# Patient Record
Sex: FEMALE | Race: BLACK OR AFRICAN AMERICAN | NOT HISPANIC OR LATINO | Employment: FULL TIME | URBAN - METROPOLITAN AREA
[De-identification: names, ages, dates, MRNs, and addresses within clinical notes are randomized per-mention and may not be internally consistent; named-entity substitution may affect disease eponyms.]

---

## 2017-02-06 ENCOUNTER — HOSPITAL ENCOUNTER (EMERGENCY)
Facility: HOSPITAL | Age: 27
Discharge: HOME | End: 2017-02-06
Attending: EMERGENCY MEDICINE
Payer: COMMERCIAL

## 2017-02-06 VITALS
RESPIRATION RATE: 18 BRPM | HEIGHT: 72 IN | WEIGHT: 293 LBS | OXYGEN SATURATION: 98 % | DIASTOLIC BLOOD PRESSURE: 86 MMHG | TEMPERATURE: 98 F | SYSTOLIC BLOOD PRESSURE: 152 MMHG | BODY MASS INDEX: 39.68 KG/M2 | HEART RATE: 85 BPM

## 2017-02-06 DIAGNOSIS — S20.212A CHEST WALL CONTUSION, LEFT, INITIAL ENCOUNTER: ICD-10-CM

## 2017-02-06 DIAGNOSIS — S39.012A BACK STRAIN, INITIAL ENCOUNTER: Primary | ICD-10-CM
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
## 2017-02-06 DIAGNOSIS — F41.1 ANXIETY REACTION: ICD-10-CM

## 2017-02-06 LAB
B-HCG UR QL: NEGATIVE
CTP QC/QA: YES

## 2017-02-06 PROCEDURE — 99284 EMERGENCY DEPT VISIT MOD MDM: CPT | Mod: 25

## 2017-02-06 PROCEDURE — 96374 THER/PROPH/DIAG INJ IV PUSH: CPT

## 2017-02-06 PROCEDURE — 87186 SC STD MICRODIL/AGAR DIL: CPT

## 2017-02-06 PROCEDURE — 87077 CULTURE AEROBIC IDENTIFY: CPT

## 2017-02-06 PROCEDURE — 63600175 PHARM REV CODE 636 W HCPCS: Performed by: EMERGENCY MEDICINE

## 2017-02-06 PROCEDURE — 81025 URINE PREGNANCY TEST: CPT | Performed by: EMERGENCY MEDICINE

## 2017-02-06 PROCEDURE — 87070 CULTURE OTHR SPECIMN AEROBIC: CPT

## 2017-02-06 RX ORDER — METHOCARBAMOL 500 MG/1
1000 TABLET, FILM COATED ORAL 3 TIMES DAILY
Qty: 30 TABLET | Refills: 0 | Status: SHIPPED | OUTPATIENT
Start: 2017-02-06 | End: 2017-02-11

## 2017-02-06 RX ORDER — CIPROFLOXACIN 500 MG/1
500 TABLET ORAL 2 TIMES DAILY
COMMUNITY
End: 2017-07-27 | Stop reason: ALTCHOICE

## 2017-02-06 RX ORDER — ONDANSETRON 2 MG/ML
4 INJECTION INTRAMUSCULAR; INTRAVENOUS
Status: COMPLETED | OUTPATIENT
Start: 2017-02-06 | End: 2017-02-06

## 2017-02-06 RX ADMIN — ONDANSETRON 4 MG: 2 INJECTION INTRAMUSCULAR; INTRAVENOUS at 06:02
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
## 2017-02-09 LAB — BACTERIA SPEC AEROBE CULT: NORMAL

## 2017-07-27 ENCOUNTER — HOSPITAL ENCOUNTER (EMERGENCY)
Facility: HOSPITAL | Age: 27
Discharge: HOME | End: 2017-07-27
Attending: EMERGENCY MEDICINE

## 2017-07-27 VITALS
HEIGHT: 72 IN | TEMPERATURE: 98 F | RESPIRATION RATE: 18 BRPM | BODY MASS INDEX: 39.68 KG/M2 | WEIGHT: 293 LBS | HEART RATE: 106 BPM | SYSTOLIC BLOOD PRESSURE: 132 MMHG | DIASTOLIC BLOOD PRESSURE: 86 MMHG | OXYGEN SATURATION: 100 %

## 2017-07-27 DIAGNOSIS — J18.9 PNEUMONIA OF RIGHT MIDDLE LOBE DUE TO INFECTIOUS ORGANISM: Primary | ICD-10-CM

## 2017-07-27 DIAGNOSIS — R06.02 SHORTNESS OF BREATH: ICD-10-CM

## 2017-07-27 DIAGNOSIS — R05.9 COUGH: ICD-10-CM

## 2017-07-27 LAB
ALBUMIN SERPL BCP-MCNC: 3.4 G/DL
ALP SERPL-CCNC: 49 U/L
ALT SERPL W/O P-5'-P-CCNC: 17 U/L
ANION GAP SERPL CALC-SCNC: 12 MMOL/L
AST SERPL-CCNC: 19 U/L
B-HCG UR QL: NEGATIVE
BASOPHILS # BLD AUTO: 0.04 K/UL
BASOPHILS NFR BLD: 0.6 %
BILIRUB SERPL-MCNC: 0.4 MG/DL
BNP SERPL-MCNC: 26 PG/ML
BUN SERPL-MCNC: 11 MG/DL
CALCIUM SERPL-MCNC: 9.5 MG/DL
CHLORIDE SERPL-SCNC: 106 MMOL/L
CO2 SERPL-SCNC: 25 MMOL/L
CREAT SERPL-MCNC: 0.8 MG/DL
CTP QC/QA: YES
DIFFERENTIAL METHOD: ABNORMAL
EOSINOPHIL # BLD AUTO: 0.2 K/UL
EOSINOPHIL NFR BLD: 2.4 %
ERYTHROCYTE [DISTWIDTH] IN BLOOD BY AUTOMATED COUNT: 13 %
EST. GFR  (AFRICAN AMERICAN): >60 ML/MIN/1.73 M^2
EST. GFR  (NON AFRICAN AMERICAN): >60 ML/MIN/1.73 M^2
GLUCOSE SERPL-MCNC: 100 MG/DL
HCT VFR BLD AUTO: 37.3 %
HGB BLD-MCNC: 12.4 G/DL
LYMPHOCYTES # BLD AUTO: 2.3 K/UL
LYMPHOCYTES NFR BLD: 37 %
MCH RBC QN AUTO: 28 PG
MCHC RBC AUTO-ENTMCNC: 33.2 G/DL
MCV RBC AUTO: 84 FL
MONOCYTES # BLD AUTO: 0.5 K/UL
MONOCYTES NFR BLD: 8.4 %
NEUTROPHILS # BLD AUTO: 3.2 K/UL
NEUTROPHILS NFR BLD: 51.6 %
PLATELET # BLD AUTO: 353 K/UL
PMV BLD AUTO: 9.2 FL
POTASSIUM SERPL-SCNC: 3.7 MMOL/L
PROT SERPL-MCNC: 7.6 G/DL
RBC # BLD AUTO: 4.43 M/UL
SODIUM SERPL-SCNC: 143 MMOL/L
WBC # BLD AUTO: 6.21 K/UL

## 2017-07-27 PROCEDURE — 96361 HYDRATE IV INFUSION ADD-ON: CPT

## 2017-07-27 PROCEDURE — 99285 EMERGENCY DEPT VISIT HI MDM: CPT | Mod: 25

## 2017-07-27 PROCEDURE — 93010 ELECTROCARDIOGRAM REPORT: CPT | Mod: ,,, | Performed by: INTERNAL MEDICINE

## 2017-07-27 PROCEDURE — 94761 N-INVAS EAR/PLS OXIMETRY MLT: CPT

## 2017-07-27 PROCEDURE — 96375 TX/PRO/DX INJ NEW DRUG ADDON: CPT

## 2017-07-27 PROCEDURE — 25000003 PHARM REV CODE 250: Performed by: EMERGENCY MEDICINE

## 2017-07-27 PROCEDURE — 27000221 HC OXYGEN, UP TO 24 HOURS

## 2017-07-27 PROCEDURE — 25000242 PHARM REV CODE 250 ALT 637 W/ HCPCS: Performed by: EMERGENCY MEDICINE

## 2017-07-27 PROCEDURE — 94640 AIRWAY INHALATION TREATMENT: CPT

## 2017-07-27 PROCEDURE — 96374 THER/PROPH/DIAG INJ IV PUSH: CPT

## 2017-07-27 PROCEDURE — 83880 ASSAY OF NATRIURETIC PEPTIDE: CPT

## 2017-07-27 PROCEDURE — 81025 URINE PREGNANCY TEST: CPT | Performed by: EMERGENCY MEDICINE

## 2017-07-27 PROCEDURE — 25500020 PHARM REV CODE 255: Performed by: EMERGENCY MEDICINE

## 2017-07-27 PROCEDURE — 85025 COMPLETE CBC W/AUTO DIFF WBC: CPT

## 2017-07-27 PROCEDURE — 63600175 PHARM REV CODE 636 W HCPCS: Performed by: EMERGENCY MEDICINE

## 2017-07-27 PROCEDURE — 93005 ELECTROCARDIOGRAM TRACING: CPT

## 2017-07-27 PROCEDURE — 80053 COMPREHEN METABOLIC PANEL: CPT

## 2017-07-27 RX ORDER — ACETAMINOPHEN 325 MG/1
650 TABLET ORAL
Status: COMPLETED | OUTPATIENT
Start: 2017-07-27 | End: 2017-07-27

## 2017-07-27 RX ORDER — METHYLPREDNISOLONE SODIUM SUCCINATE 125 MG/2ML
125 INJECTION INTRAMUSCULAR; INTRAVENOUS
Status: COMPLETED | OUTPATIENT
Start: 2017-07-27 | End: 2017-07-27

## 2017-07-27 RX ORDER — ALBUTEROL SULFATE 0.83 MG/ML
2.5 SOLUTION RESPIRATORY (INHALATION) EVERY 6 HOURS PRN
Qty: 75 ML | Refills: 0 | Status: SHIPPED | OUTPATIENT
Start: 2017-07-27 | End: 2018-09-17

## 2017-07-27 RX ORDER — DIPHENHYDRAMINE HYDROCHLORIDE 50 MG/ML
25 INJECTION INTRAMUSCULAR; INTRAVENOUS
Status: COMPLETED | OUTPATIENT
Start: 2017-07-27 | End: 2017-07-27

## 2017-07-27 RX ORDER — LEVOFLOXACIN 750 MG/1
750 TABLET ORAL DAILY
Qty: 7 TABLET | Refills: 0 | Status: SHIPPED | OUTPATIENT
Start: 2017-07-27 | End: 2017-08-03

## 2017-07-27 RX ORDER — PROMETHAZINE HYDROCHLORIDE AND CODEINE PHOSPHATE 6.25; 1 MG/5ML; MG/5ML
5 SOLUTION ORAL EVERY 4 HOURS PRN
COMMUNITY
End: 2017-07-27 | Stop reason: ALTCHOICE

## 2017-07-27 RX ORDER — ALBUTEROL SULFATE 90 UG/1
1-2 AEROSOL, METERED RESPIRATORY (INHALATION) EVERY 4 HOURS PRN
Qty: 1 INHALER | Refills: 1 | Status: SHIPPED | OUTPATIENT
Start: 2017-07-27 | End: 2017-08-26

## 2017-07-27 RX ORDER — AMOXICILLIN 500 MG/1
500 CAPSULE ORAL
COMMUNITY
End: 2018-09-17

## 2017-07-27 RX ORDER — MOXIFLOXACIN HYDROCHLORIDE 400 MG/1
400 TABLET ORAL DAILY
Status: DISCONTINUED | OUTPATIENT
Start: 2017-07-27 | End: 2017-07-27 | Stop reason: HOSPADM

## 2017-07-27 RX ORDER — IPRATROPIUM BROMIDE AND ALBUTEROL SULFATE 2.5; .5 MG/3ML; MG/3ML
3 SOLUTION RESPIRATORY (INHALATION)
Status: COMPLETED | OUTPATIENT
Start: 2017-07-27 | End: 2017-07-27

## 2017-07-27 RX ORDER — HYDROCODONE BITARTRATE AND HOMATROPINE METHYLBROMIDE ORAL SOLUTION 5; 1.5 MG/5ML; MG/5ML
5 LIQUID ORAL EVERY 4 HOURS PRN
Qty: 120 ML | Refills: 0 | Status: SHIPPED | OUTPATIENT
Start: 2017-07-27 | End: 2018-09-17

## 2017-07-27 RX ORDER — BENZONATATE 200 MG/1
200 CAPSULE ORAL 3 TIMES DAILY PRN
Qty: 20 CAPSULE | Refills: 0 | Status: SHIPPED | OUTPATIENT
Start: 2017-07-27 | End: 2017-08-06

## 2017-07-27 RX ORDER — BENZONATATE 100 MG/1
100 CAPSULE ORAL 3 TIMES DAILY PRN
Status: DISCONTINUED | OUTPATIENT
Start: 2017-07-27 | End: 2017-07-27 | Stop reason: HOSPADM

## 2017-07-27 RX ORDER — MORPHINE SULFATE 10 MG/ML
8 INJECTION INTRAMUSCULAR; INTRAVENOUS; SUBCUTANEOUS
Status: COMPLETED | OUTPATIENT
Start: 2017-07-27 | End: 2017-07-27

## 2017-07-27 RX ADMIN — BENZONATATE 100 MG: 100 CAPSULE ORAL at 10:07

## 2017-07-27 RX ADMIN — IPRATROPIUM BROMIDE AND ALBUTEROL SULFATE 3 ML: .5; 3 SOLUTION RESPIRATORY (INHALATION) at 09:07

## 2017-07-27 RX ADMIN — METHYLPREDNISOLONE SODIUM SUCCINATE 125 MG: 125 INJECTION, POWDER, FOR SOLUTION INTRAMUSCULAR; INTRAVENOUS at 11:07

## 2017-07-27 RX ADMIN — ACETAMINOPHEN 650 MG: 325 TABLET, FILM COATED ORAL at 09:07

## 2017-07-27 RX ADMIN — DIPHENHYDRAMINE HYDROCHLORIDE 25 MG: 50 INJECTION, SOLUTION INTRAMUSCULAR; INTRAVENOUS at 11:07

## 2017-07-27 RX ADMIN — IOHEXOL 75 ML: 350 INJECTION, SOLUTION INTRAVENOUS at 11:07

## 2017-07-27 RX ADMIN — SODIUM CHLORIDE 1000 ML: 0.9 INJECTION, SOLUTION INTRAVENOUS at 10:07

## 2017-07-27 RX ADMIN — IPRATROPIUM BROMIDE AND ALBUTEROL SULFATE 3 ML: .5; 3 SOLUTION RESPIRATORY (INHALATION) at 11:07

## 2017-07-27 RX ADMIN — MORPHINE SULFATE 8 MG: 10 INJECTION INTRAVENOUS at 11:07

## 2017-07-27 RX ADMIN — MOXIFLOXACIN HYDROCHLORIDE 400 MG: 400 TABLET, FILM COATED ORAL at 12:07

## 2017-07-27 NOTE — ED NOTES
Pt resting in comfortably in stretcher. Bed wheels locked, bed in lowest position, and call light within reach. VSS, no acute distress noted. Will continue to monitor.

## 2017-07-27 NOTE — ED PROVIDER NOTES
Encounter Date: 7/27/2017    SCRIBE #1 NOTE: I, Mine Aviles, am scribing for, and in the presence of,  Claude Willson MD . I have scribed the following portions of the note - Other sections scribed: HPI and ROS .       History     Chief Complaint   Patient presents with    Shortness of Breath     cough x 1 week, went to ED without releaf.  temp in 100's.  now with exp wheezing.  no Hx of asthma     CC:Shortness of Breath.   History obtained from patient.  Pt arrived via personal transportation.     HPI: This 26 y.o. Obese Female, who has HTN, IBS, and Migraine Headaches, presents to the ED for evaluation of a 1 week history of a persistent non-productive cough with associated fever, chest tightness, and dizziness. She was evaluated 6 days ago by her PCP who prescribed her Amoxicillin for URI. She has been compliant with this, but notes that symptoms have progressively worsened with development of wheezing. Overall symptoms are worse at night. She denies nausea, vomiting, diarrhea, dysuria, urinary frequency, leg swelling, or sore throat. She reports no further symptoms.       The history is provided by the patient. No  was used.     Review of patient's allergies indicates:   Allergen Reactions    Hydromorphone (bulk) Itching     Past Medical History:   Diagnosis Date    Anxiety     Back problem     Hypertension     IBS (irritable bowel syndrome)     Migraine headache     Obesity      History reviewed. No pertinent surgical history.  Family History   Problem Relation Age of Onset    Breast cancer Neg Hx     Colon cancer Neg Hx     Ovarian cancer Neg Hx      Social History   Substance Use Topics    Smoking status: Never Smoker    Smokeless tobacco: Never Used    Alcohol use No      Comment: socially     Review of Systems   Constitutional: Positive for fever.   HENT: Negative for sore throat.    Eyes: Negative for redness.   Respiratory: Positive for cough, chest tightness and  wheezing.    Cardiovascular: Negative for leg swelling.   Gastrointestinal: Negative for diarrhea, nausea and vomiting.   Genitourinary: Negative for dysuria.   Musculoskeletal: Negative for myalgias.   Skin: Negative for rash.   Neurological: Negative for headaches.       Physical Exam     Initial Vitals [07/27/17 0839]   BP Pulse Resp Temp SpO2   (!) 167/107 (!) 118 (!) 24 99.5 °F (37.5 °C) --      MAP       127         Physical Exam    Nursing note and vitals reviewed.  Constitutional: She appears well-developed and well-nourished. She appears distressed.   Eyes: EOM are normal. Pupils are equal, round, and reactive to light.   Neck: Normal range of motion. Neck supple. No thyromegaly present. No JVD present.   Cardiovascular: Regular rhythm, normal heart sounds and intact distal pulses. Exam reveals no gallop and no friction rub.    No murmur heard.  Tachycardic    Pulmonary/Chest: No respiratory distress. She has wheezes. She has rhonchi.   Persistent cough   Abdominal: Soft. Bowel sounds are normal. She exhibits no distension. There is no tenderness. There is no rebound and no guarding.   Musculoskeletal: Normal range of motion. She exhibits no edema or tenderness.   Neurological: She is alert and oriented to person, place, and time. She has normal strength. She displays normal reflexes. No cranial nerve deficit.   Skin: Skin is warm and dry.         ED Course   Procedures  Labs Reviewed   CBC W/ AUTO DIFFERENTIAL - Abnormal; Notable for the following:        Result Value    Platelets 353 (*)     All other components within normal limits   COMPREHENSIVE METABOLIC PANEL - Abnormal; Notable for the following:     Albumin 3.4 (*)     Alkaline Phosphatase 49 (*)     All other components within normal limits   B-TYPE NATRIURETIC PEPTIDE   POCT URINE PREGNANCY     EKG Readings: (Independently Interpreted)   Initial Reading: No STEMI. Rhythm: Sinus Tachycardia. Heart Rate: 106. Ectopy: No Ectopy. Conduction:  Normal. ST Segments: Normal ST Segments. T Waves: Normal.       X-Rays:   Independently Interpreted Readings:   Chest X-Ray: Normal heart size.  No infiltrates.  No acute abnormalities.       patient presents with cough.  She does have some pleuritic chest pain on the right.  States she's having shortness of breath.  Patient presented tachycardic with oxygen saturations in the low to mid 90s.  Patient mildly tachypnea.  No calf pain or leg swelling.  No orthopnea.  Patient reports fever but no fever in the emergency department.  No leukocytosis.  Chest x-ray did not show definitive pneumonia.  With consolation symptoms concerning for potential pulmonary embolism.  CT scan was performed shows no pulmonary embolism but does show right middle lobe pneumonia.  Patient has been treated with amoxicillin for the past several days.  No improvement.  Will give Avelox in the emergency department.  We'll prescribe Levaquin.  Patient has been taking Phenergan with codeine without cough suppression.  We'll switch to hydrocodone based cough medicine as well as Tessalon Perles.  Patient did get improvement with nebulizer treatments in the emergency department.  We'll prescribe pump inhaler and medication for her nebulizer.  Patient states she has the means to by the nebulizer.  After treatments patient's heart rate currently 93.  Patient's oxygen saturation currently 95% on room air.  Patient's cough is currently controlled.  Patient states she feels improved.  I did discuss admission for IV antibiotics due to prior antibiotic use.  Patient wants to go home.    I feel The patient is stable to go home.  Return to emergency room for new or worsening symptoms.  I also feel the patient has not been on appropriate antibiotic.            Scribe Attestation:   Scribe #1: I performed the above scribed service and the documentation accurately describes the services I performed. I attest to the accuracy of the note.    Attending  Attestation:           Physician Attestation for Scribe:  Physician Attestation Statement for Scribe #1: I, Claude Willson MD, reviewed documentation, as scribed by Mine Aviles in my presence, and it is both accurate and complete.                 ED Course     Clinical Impression:   The primary encounter diagnosis was Pneumonia of right middle lobe due to infectious organism. Diagnoses of Shortness of breath and Cough were also pertinent to this visit.                           Claude Willson MD  07/27/17 [id number]
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
## 2017-11-17 ENCOUNTER — HISTORICAL (OUTPATIENT)
Dept: INTERNAL MEDICINE | Facility: CLINIC | Age: 27
End: 2017-11-17

## 2017-11-17 LAB
ABS NEUT (OLG): 2.76 X10(3)/MCL (ref 2.1–9.2)
ALBUMIN SERPL-MCNC: 3.4 GM/DL (ref 3.4–5)
ALBUMIN/GLOB SERPL: 1 RATIO (ref 1–2)
ALP SERPL-CCNC: 57 UNIT/L (ref 45–117)
ALT SERPL-CCNC: 19 UNIT/L (ref 12–78)
APPEARANCE, UA: ABNORMAL
AST SERPL-CCNC: 9 UNIT/L (ref 15–37)
BACTERIA #/AREA URNS AUTO: ABNORMAL /[HPF]
BASOPHILS # BLD AUTO: 0.02 X10(3)/MCL
BASOPHILS NFR BLD AUTO: 0 % (ref 0–1)
BILIRUB SERPL-MCNC: 0.5 MG/DL (ref 0.2–1)
BILIRUB UR QL STRIP: NEGATIVE
BILIRUBIN DIRECT+TOT PNL SERPL-MCNC: 0.1 MG/DL
BILIRUBIN DIRECT+TOT PNL SERPL-MCNC: 0.4 MG/DL
BUN SERPL-MCNC: 14 MG/DL (ref 7–18)
CALCIUM SERPL-MCNC: 8.8 MG/DL (ref 8.5–10.1)
CHLORIDE SERPL-SCNC: 107 MMOL/L (ref 98–107)
CHOLEST SERPL-MCNC: 159 MG/DL
CHOLEST/HDLC SERPL: 3.5 {RATIO} (ref 0–4.4)
CO2 SERPL-SCNC: 28 MMOL/L (ref 21–32)
COLOR UR: YELLOW
CREAT SERPL-MCNC: 0.7 MG/DL (ref 0.6–1.3)
EOSINOPHIL # BLD AUTO: 0.06 X10(3)/MCL
EOSINOPHIL NFR BLD AUTO: 1 % (ref 0–5)
ERYTHROCYTE [DISTWIDTH] IN BLOOD BY AUTOMATED COUNT: 13.3 % (ref 11.5–14.5)
EST. AVERAGE GLUCOSE BLD GHB EST-MCNC: 108 MG/DL
GLOBULIN SER-MCNC: 4.1 GM/ML (ref 2.3–3.5)
GLUCOSE (UA): NORMAL
GLUCOSE SERPL-MCNC: 100 MG/DL (ref 74–106)
HAV IGM SERPL QL IA: NONREACTIVE
HBA1C MFR BLD: 5.4 % (ref 4.2–6.3)
HBV CORE IGM SERPL QL IA: NONREACTIVE
HBV SURFACE AG SERPL QL IA: NEGATIVE
HCT VFR BLD AUTO: 39.2 % (ref 35–46)
HCV AB SERPL QL IA: NONREACTIVE
HDLC SERPL-MCNC: 45 MG/DL
HGB BLD-MCNC: 12.9 GM/DL (ref 12–16)
HGB UR QL STRIP: 0.06 MG/DL
HIV 1+2 AB+HIV1 P24 AG SERPL QL IA: NONREACTIVE
HYALINE CASTS #/AREA URNS LPF: ABNORMAL /[LPF]
IMM GRANULOCYTES # BLD AUTO: 0.01 10*3/UL
IMM GRANULOCYTES NFR BLD AUTO: 0 %
KETONES UR QL STRIP: NEGATIVE
LDLC SERPL CALC-MCNC: 100 MG/DL (ref 0–130)
LEUKOCYTE ESTERASE UR QL STRIP: NEGATIVE
LYMPHOCYTES # BLD AUTO: 2.09 X10(3)/MCL
LYMPHOCYTES NFR BLD AUTO: 38 % (ref 15–40)
MCH RBC QN AUTO: 27.7 PG (ref 26–34)
MCHC RBC AUTO-ENTMCNC: 32.9 GM/DL (ref 31–37)
MCV RBC AUTO: 84.1 FL (ref 80–100)
MONOCYTES # BLD AUTO: 0.53 X10(3)/MCL
MONOCYTES NFR BLD AUTO: 10 % (ref 4–12)
NEUTROPHILS # BLD AUTO: 2.76 X10(3)/MCL
NEUTROPHILS NFR BLD AUTO: 50 X10(3)/MCL
NITRITE UR QL STRIP: NEGATIVE
PH UR STRIP: 5.5 [PH] (ref 4.5–8)
PLATELET # BLD AUTO: 293 X10(3)/MCL (ref 130–400)
PMV BLD AUTO: 9.4 FL (ref 7.4–10.4)
POTASSIUM SERPL-SCNC: 4 MMOL/L (ref 3.5–5.1)
PROT SERPL-MCNC: 7.5 GM/DL (ref 6.4–8.2)
PROT UR QL STRIP: 30 MG/DL
RBC # BLD AUTO: 4.66 X10(6)/MCL (ref 4–5.2)
RBC #/AREA URNS AUTO: ABNORMAL /[HPF]
SODIUM SERPL-SCNC: 141 MMOL/L (ref 136–145)
SP GR UR STRIP: 1.02 (ref 1–1.03)
SQUAMOUS #/AREA URNS LPF: >100 /[LPF]
T PALLIDUM AB SER QL: NONREACTIVE
TRIGL SERPL-MCNC: 71 MG/DL
TSH SERPL-ACNC: 3.19 MIU/L (ref 0.36–3.74)
UROBILINOGEN UR STRIP-ACNC: NORMAL
VLDLC SERPL CALC-MCNC: 14 MG/DL
WBC # SPEC AUTO: 5.5 X10(3)/MCL (ref 4.5–11)
WBC #/AREA URNS AUTO: ABNORMAL /HPF

## 2018-09-14 ENCOUNTER — TELEPHONE (OUTPATIENT)
Dept: OBSTETRICS AND GYNECOLOGY | Facility: CLINIC | Age: 28
End: 2018-09-14

## 2018-09-14 ENCOUNTER — HOSPITAL ENCOUNTER (EMERGENCY)
Facility: HOSPITAL | Age: 28
Discharge: HOME | End: 2018-09-14
Attending: EMERGENCY MEDICINE
Payer: MEDICAID

## 2018-09-14 VITALS
HEIGHT: 72 IN | BODY MASS INDEX: 39.68 KG/M2 | DIASTOLIC BLOOD PRESSURE: 103 MMHG | HEART RATE: 95 BPM | RESPIRATION RATE: 18 BRPM | TEMPERATURE: 98 F | OXYGEN SATURATION: 95 % | WEIGHT: 293 LBS | SYSTOLIC BLOOD PRESSURE: 175 MMHG

## 2018-09-14 DIAGNOSIS — R79.89 ELEVATED SERUM HCG: ICD-10-CM

## 2018-09-14 DIAGNOSIS — R10.30 LOWER ABDOMINAL PAIN: Primary | ICD-10-CM

## 2018-09-14 LAB
B-HCG UR QL: NEGATIVE
BACTERIA #/AREA URNS HPF: NORMAL /HPF
BILIRUB UR QL STRIP: NEGATIVE
CLARITY UR: CLEAR
COLOR UR: YELLOW
CTP QC/QA: YES
GLUCOSE UR QL STRIP: NEGATIVE
HCG INTACT+B SERPL-ACNC: 55 MIU/ML
HGB UR QL STRIP: ABNORMAL
HYALINE CASTS #/AREA URNS LPF: 0 /LPF
KETONES UR QL STRIP: NEGATIVE
LEUKOCYTE ESTERASE UR QL STRIP: NEGATIVE
MICROSCOPIC COMMENT: NORMAL
NITRITE UR QL STRIP: NEGATIVE
PH UR STRIP: 5 [PH] (ref 5–8)
PROT UR QL STRIP: ABNORMAL
RBC #/AREA URNS HPF: 3 /HPF (ref 0–4)
SP GR UR STRIP: 1.02 (ref 1–1.03)
SQUAMOUS #/AREA URNS HPF: NORMAL /HPF
URN SPEC COLLECT METH UR: ABNORMAL
UROBILINOGEN UR STRIP-ACNC: ABNORMAL EU/DL
WBC #/AREA URNS HPF: 0 /HPF (ref 0–5)

## 2018-09-14 PROCEDURE — 84702 CHORIONIC GONADOTROPIN TEST: CPT

## 2018-09-14 PROCEDURE — 25000003 PHARM REV CODE 250: Performed by: PHYSICIAN ASSISTANT

## 2018-09-14 PROCEDURE — 99284 EMERGENCY DEPT VISIT MOD MDM: CPT

## 2018-09-14 PROCEDURE — 81000 URINALYSIS NONAUTO W/SCOPE: CPT

## 2018-09-14 PROCEDURE — 81025 URINE PREGNANCY TEST: CPT | Performed by: PHYSICIAN ASSISTANT

## 2018-09-14 RX ORDER — METOPROLOL TARTRATE 50 MG/1
50 TABLET ORAL DAILY
COMMUNITY
End: 2019-09-21 | Stop reason: SDUPTHER

## 2018-09-14 RX ORDER — PROMETHAZINE HYDROCHLORIDE 25 MG/1
25 TABLET ORAL EVERY 4 HOURS PRN
COMMUNITY

## 2018-09-14 RX ORDER — DICYCLOMINE HYDROCHLORIDE 10 MG/1
10 CAPSULE ORAL 3 TIMES DAILY PRN
COMMUNITY

## 2018-09-14 RX ORDER — ACETAMINOPHEN 500 MG
1000 TABLET ORAL
Status: COMPLETED | OUTPATIENT
Start: 2018-09-14 | End: 2018-09-14

## 2018-09-14 RX ADMIN — ACETAMINOPHEN 1000 MG: 500 TABLET, FILM COATED ORAL at 11:09

## 2018-09-14 NOTE — ED TRIAGE NOTES
Patient states she took multiple home pregnancy tests since Sunday with positive results. Seen by OBGYN on yesterday, with negative results. Took 2 more home tests after seeing MD with positive results. Vaginal spotting and abdominal cramping started on last PM.

## 2018-09-14 NOTE — TELEPHONE ENCOUNTER
----- Message from Bee Bermeo sent at 9/14/2018  9:42 AM CDT -----  Contact: Elena [phone]  Patient would like a call back. She has been experiencing some light spotting and feels she may be pregnant. The patient took an at home pregnancy test and it came back positive. Please call at your earliest convenience.

## 2018-09-14 NOTE — ED PROVIDER NOTES
Encounter Date: 9/14/2018  SORT:   28-year-old female approximately 4 weeks 5 days gravid based on last menstrual period 08/12/2018 presenting for evaluation of vaginal spotting began last night with associated lower abdominal cramping and nausea that began this morning.  Patient states she had positive UPT at home. Initial orders placed. Deb Strange PA-C       History     Chief Complaint   Patient presents with    Abdominal Pain     States she thinks she may be pregnant and is cramping and spotting.  Bleeding started last night.  Also states she is nauseous.  Last cycle was aug 12.     Vaginal Bleeding     28-year-old female with past medical history anxiety, hypertension, IBS, history of migraines, morbidly obese, presents to ED complaining of suspicion for pregnancy.  Patient states she has very irregular menstrual cycles, typically beginning on the 80 per month, ending on the 12th of the month.  She admits to heavy bleeding during each menstrual cycle with significant lower abdominal cramping.  Patient states she began with spotting on September 7th, September 8th.  She admits to numerous positive home pregnancy tests over the past 2 days.  She presented to her OB GYNs office yesterday, negative in office UPT x2.  This morning she began with lower abdominal cramping, primarily to the right inguinal region.  Two positive home pregnancy test this morning.  No history of pregnancy.  No history of abdominal surgeries.  No history of ovarian cyst or uterine fibroids.  Denies vaginal bleeding, spotting, discharge, discomfort today.  No nausea or emesis.  No alleviating or exacerbating factors.  No radiation of symptoms. Severity 5/10. No change in bowel habits.  No urinary complaints, no flank pain.          Review of patient's allergies indicates:   Allergen Reactions    Hydromorphone (bulk) Itching     Past Medical History:   Diagnosis Date    Anxiety     Back problem     Hypertension     IBS (irritable  bowel syndrome)     Migraine headache     Obesity      No past surgical history on file.  Family History   Problem Relation Age of Onset    Breast cancer Neg Hx     Colon cancer Neg Hx     Ovarian cancer Neg Hx      Social History     Tobacco Use    Smoking status: Never Smoker    Smokeless tobacco: Never Used   Substance Use Topics    Alcohol use: No     Comment: socially    Drug use: No     Review of Systems   Constitutional: Negative for chills and fever.   HENT: Negative for sore throat.    Eyes: Negative.    Respiratory: Negative for shortness of breath.    Cardiovascular: Negative for chest pain.   Gastrointestinal: Positive for abdominal pain. Negative for constipation, diarrhea, nausea and vomiting.   Endocrine: Negative.    Genitourinary: Positive for menstrual problem. Negative for difficulty urinating, dysuria, flank pain, frequency, pelvic pain, vaginal bleeding, vaginal discharge and vaginal pain.   Musculoskeletal: Negative for back pain, myalgias, neck pain and neck stiffness.   Skin: Negative for rash.   Neurological: Negative for dizziness, weakness, light-headedness and headaches.   Hematological: Does not bruise/bleed easily.   Psychiatric/Behavioral: Negative.    All other systems reviewed and are negative.      Physical Exam     Initial Vitals [09/14/18 1030]   BP Pulse Resp Temp SpO2   (!) 193/102 94 16 98.9 °F (37.2 °C) 100 %      MAP       --         Physical Exam    Nursing note and vitals reviewed.  Constitutional: She appears well-developed and well-nourished. She is not diaphoretic. No distress.   Overall well-appearing, nontoxic.  Resting comfortably on exam table.   HENT:   Head: Normocephalic and atraumatic.   Eyes: Conjunctivae and EOM are normal. Pupils are equal, round, and reactive to light.   Neck: Normal range of motion. Neck supple. No tracheal deviation present.   Cardiovascular: Intact distal pulses.   Pulmonary/Chest: Breath sounds normal. No stridor. No respiratory  distress. She has no wheezes.   Abdominal:   Abdomen overall soft, normal bowel sounds ×4.  Mild ttp R inguinal region. No inguinal lymphadenopathy.  No rebound or guarding.  No palpable mass or distention.  No flank or CVA tenderness.  Negative Lamar sign.  No pain over McBurney's point.   Lymphadenopathy:     She has no cervical adenopathy.   Neurological: She is alert and oriented to person, place, and time.   Skin: Skin is warm and dry. Capillary refill takes less than 2 seconds.   Psychiatric: She has a normal mood and affect. Her behavior is normal. Judgment and thought content normal.         ED Course   Procedures  Labs Reviewed   URINALYSIS, REFLEX TO URINE CULTURE - Abnormal; Notable for the following components:       Result Value    Protein, UA 1+ (*)     Occult Blood UA 2+ (*)     Urobilinogen, UA 2.0-3.0 (*)     All other components within normal limits    Narrative:     Preferred Collection Type->Urine, Clean Catch   HCG, QUANTITATIVE, PREGNANCY   URINALYSIS MICROSCOPIC    Narrative:     Preferred Collection Type->Urine, Clean Catch   POCT URINE PREGNANCY          Imaging Results          US OB Less Than 14 Wks with Transvaginal (xpd) (Final result)  Result time 09/14/18 14:09:20    Final result by Minh Rachel MD (09/14/18 14:09:20)                 Impression:      No IUP identified consistent with pregnancy of unknown location.  Follow-up with serial beta HCG and pelvic ultrasound as clinically warranted.    Right ovarian 2.5 cm probable simple cyst.      Electronically signed by: Minh Rachel MD  Date:    09/14/2018  Time:    14:09             Narrative:    EXAMINATION:  US OB LESS THAN 14 WKS WITH TRANSVAGINAL (XPD)    CLINICAL HISTORY:  R inguinal pain, multiple +home UPT, hcg 55;    TECHNIQUE:  Transabdominal sonography of the pelvis was performed, followed by transvaginal sonography to better evaluate the uterus and ovaries.    COMPARISON:  CT renal stone study  03/29/2014    FINDINGS:  Uterus measures 8.6 x 6 x 4.8 cm.  There are at least 2 intramural fibroids within the posterior uterine body measuring 1.1 x 1 x 1.1 cm and 1 x 1 x 0.8 cm.  Endometrium measures up to 1 cm in thickness.  A few nabothian cysts are noted.    No IUP or extra uterine gestational sac seen.    Right ovary: 4.3 x 4.1 x 2.5 cm with intact arterial and venous flow, containing a 2.1 x 2 x 2.5 cm well-circumscribed nonvascular anechoic structure suggestive of a small simple cyst.    Left ovary: 2.1 x 1.3 x 3.3 cm with intact arterial and venous flow.    Miscellaneous: Trace free fluid with seen within the cervical canal, nonspecific but may be physiologic.  No free fluid within the cul-de-sac.  Beta HCG is reportedly 55 units.                                 Medical Decision Making:   Differential Diagnosis:   Ovarian cyst, PID, UTI, STI, abdominal plain during menses  ED Management:  Pt did bring home UPT to ED; both are +. Our ED UPT is negative. Given conflicting results, hcg pending.    HCG 55. U/S pending. R inguinal ttp and pain since this morning. No hx ovarian cyst, no hx TOA. No hx nephrolithiasis. Urine clean. Declined pelvic. U/S pregnancy of unknown location. Small R-sided likely ovarian cyst. May be culprit of pain, however given elevated hCG, pt should see OB on Monday. She has an appt. I do not suspect surgical abdomen, however I have given her strict return precautions. Pt refused pelvic, therefore unable to do a thorough assessment of adnexa, however u/s without fallopian tube or adnexal abnormality to suggest hydrosalpinx, TOA, or ectopic. BP similar to previous presentations. She denies lightheadedness/dizziness, headache, n/v, or visual disturbance. I do feel she can be safely discharged without further intervention or work-up. She does understand and agree. Return precautions given.      Other:   I have discussed this case with another health care provider.       <> Summary of the  Discussion: Dr. Rangel                      Clinical Impression:   The primary encounter diagnosis was Lower abdominal pain. A diagnosis of Elevated serum hCG was also pertinent to this visit.      Disposition:   Disposition: Discharged                        Brijesh Guzman PA-C  09/15/18 0874

## 2018-09-17 ENCOUNTER — PATIENT MESSAGE (OUTPATIENT)
Dept: OBSTETRICS AND GYNECOLOGY | Facility: CLINIC | Age: 28
End: 2018-09-17

## 2018-09-17 ENCOUNTER — OFFICE VISIT (OUTPATIENT)
Dept: OBSTETRICS AND GYNECOLOGY | Facility: CLINIC | Age: 28
End: 2018-09-17
Payer: MEDICAID

## 2018-09-17 ENCOUNTER — LAB VISIT (OUTPATIENT)
Dept: LAB | Facility: HOSPITAL | Age: 28
End: 2018-09-17
Attending: OBSTETRICS & GYNECOLOGY
Payer: MEDICAID

## 2018-09-17 VITALS
WEIGHT: 293 LBS | HEIGHT: 72 IN | TEMPERATURE: 99 F | DIASTOLIC BLOOD PRESSURE: 98 MMHG | SYSTOLIC BLOOD PRESSURE: 140 MMHG | BODY MASS INDEX: 39.68 KG/M2

## 2018-09-17 DIAGNOSIS — O20.0 ABORTION, THREATENED, EARLY PREGNANCY: ICD-10-CM

## 2018-09-17 DIAGNOSIS — O20.0 ABORTION, THREATENED, EARLY PREGNANCY: Primary | ICD-10-CM

## 2018-09-17 LAB — HCG INTACT+B SERPL-ACNC: 44 MIU/ML

## 2018-09-17 PROCEDURE — 36415 COLL VENOUS BLD VENIPUNCTURE: CPT

## 2018-09-17 PROCEDURE — 99999 PR PBB SHADOW E&M-EST. PATIENT-LVL III: CPT | Mod: PBBFAC,,, | Performed by: OBSTETRICS & GYNECOLOGY

## 2018-09-17 PROCEDURE — 84702 CHORIONIC GONADOTROPIN TEST: CPT

## 2018-09-17 PROCEDURE — 99204 OFFICE O/P NEW MOD 45 MIN: CPT | Mod: S$PBB,TH,, | Performed by: OBSTETRICS & GYNECOLOGY

## 2018-09-17 PROCEDURE — 99213 OFFICE O/P EST LOW 20 MIN: CPT | Mod: PBBFAC,TH | Performed by: OBSTETRICS & GYNECOLOGY

## 2018-09-17 PROCEDURE — 88305 TISSUE EXAM BY PATHOLOGIST: CPT | Performed by: PATHOLOGY

## 2018-09-17 PROCEDURE — 88305 TISSUE EXAM BY PATHOLOGIST: CPT | Mod: 26,,, | Performed by: PATHOLOGY

## 2018-09-17 RX ORDER — KETOCONAZOLE 20 MG/ML
SHAMPOO, SUSPENSION TOPICAL
Refills: 2 | COMMUNITY
Start: 2018-08-18 | End: 2020-06-11

## 2018-09-17 RX ORDER — VALACYCLOVIR HYDROCHLORIDE 1 G/1
2000 TABLET, FILM COATED ORAL 2 TIMES DAILY
Refills: 0 | COMMUNITY
Start: 2018-08-10 | End: 2024-01-19

## 2018-09-17 NOTE — PROGRESS NOTES
Subjective:       Patient ID: Elena Valencia is a 28 y.o. female.    Chief Complaint:  Follow-up (ER follow up on 18 for abd pain) and Vaginal Bleeding (Possible miscarriage)      History of Present Illness  HPI  Patient comes in today for follow-up  Seen in the Emergency Department on 2018 with spotting.  Told to be in early pregnancy.  ?Miscarriage.  Quantitative beta HCG was at 55 with pelvic ultrasound showing no gestational sac.  Two small fibroids at 1 cm in diameter seen.    Bleeding heavier today.  No passage of tissue noted.      GYN & OB History  Patient's last menstrual period was 2018 (exact date).   Date of Last Pap: No result found    OB History    Para Term  AB Living   1             SAB TAB Ectopic Multiple Live Births                  # Outcome Date GA Lbr Leonardo/2nd Weight Sex Delivery Anes PTL Lv   1 Current                 Past Medical History:   Diagnosis Date    Anxiety     Back problem     Hypertension     IBS (irritable bowel syndrome)     Migraine headache     Obesity        History reviewed. No pertinent surgical history.    Family History   Problem Relation Age of Onset    Heart attack Paternal Grandfather     Cancer Paternal Grandmother 50        Pancreatic cancer    Emphysema Maternal Grandfather     Breast cancer Neg Hx     Colon cancer Neg Hx     Ovarian cancer Neg Hx        Social History     Socioeconomic History    Marital status: Single     Spouse name: None    Number of children: None    Years of education: None    Highest education level: None   Social Needs    Financial resource strain: None    Food insecurity - worry: None    Food insecurity - inability: None    Transportation needs - medical: None    Transportation needs - non-medical: None   Occupational History    None   Tobacco Use    Smoking status: Never Smoker    Smokeless tobacco: Never Used   Substance and Sexual Activity    Alcohol use: No     Comment: socially     Drug use: No    Sexual activity: Yes     Partners: Male     Birth control/protection: Condom   Other Topics Concern    None   Social History Narrative    Together since 2010    He drives trucks    She is a        Current Outpatient Medications   Medication Sig Dispense Refill    dicyclomine (BENTYL) 10 MG capsule Take 10 mg by mouth 3 (three) times daily as needed (IBS).      ketoconazole (NIZORAL) 2 % shampoo USE SHAMPOO ONCE DAILY  2    lorazepam (ATIVAN) 1 MG tablet Take 1 tablet (1 mg total) by mouth every 6 (six) hours as needed for Anxiety. 6 tablet 0    metoprolol tartrate (LOPRESSOR) 50 MG tablet Take 50 mg by mouth once daily.      promethazine (PHENERGAN) 25 MG tablet Take 25 mg by mouth every 4 (four) hours as needed for Nausea (IBS).      valACYclovir (VALTREX) 1000 MG tablet Take 2,000 mg by mouth 2 (two) times daily.  0     No current facility-administered medications for this visit.        Review of patient's allergies indicates:   Allergen Reactions    Hydromorphone (bulk) Itching       Review of Systems  Review of Systems   Constitutional: Positive for fatigue. Negative for activity change, appetite change, fever and unexpected weight change.   Respiratory: Negative for cough, shortness of breath and wheezing.    Cardiovascular: Negative for chest pain and palpitations.   Gastrointestinal: Positive for abdominal pain. Negative for vomiting.   Endocrine: Negative for hot flashes.   Genitourinary: Positive for frequency, pelvic pain, vaginal bleeding and vaginal discharge. Negative for dyspareunia, urgency, dysmenorrhea and postcoital bleeding.   Musculoskeletal: Positive for back pain. Negative for myalgias.   Skin:  Negative for rash.   Neurological: Positive for headaches. Negative for seizures.   Psychiatric/Behavioral: Negative for depression and sleep disturbance. The patient is not nervous/anxious.    Breast: Negative for breast mass, breast pain and nipple discharge           Objective:    Physical Exam:   Constitutional: She appears well-developed and well-nourished. No distress.    HENT:   Head: Normocephalic and atraumatic.    Eyes: EOM are normal.    Neck: Normal range of motion.     Pulmonary/Chest: Effort normal. No respiratory distress.   Breasts: Non-tender, no engorgement, no masses, no retraction, no discharge. Negative for lymphadenopathy.         Abdominal: Soft. She exhibits no distension. There is no tenderness. There is no rebound and no guarding.     Genitourinary: Vagina normal and uterus normal.   Genitourinary Comments: Vulva without any obvious lesions.  Vaginal vault with good support.  Moderate blood in vaginal vault noted.  No obvious lesion.  Cervix is without any cervical motion tenderness.  No obvious lesion.  ?POCs at os.  But os is closed.  Uterus is about 10 weeks, non-tender, normal contour.  Adnexa is without any masses or tenderness.           Musculoskeletal: Normal range of motion.       Neurological: She is alert.    Skin: Skin is warm and dry.    Psychiatric: She has a normal mood and affect.          Assessment:        1. , threatened, early pregnancy    2.   Possible complete          Plan:      I have discussed with the patient regarding her condition  She most likely had a complete     Tissue removed from cervix will be sent to [facility]  Quantitative beta HCG today    Back in 2 weeks.  Sooner if necessary.

## 2018-09-18 ENCOUNTER — TELEPHONE (OUTPATIENT)
Dept: OBSTETRICS AND GYNECOLOGY | Facility: CLINIC | Age: 28
End: 2018-09-18

## 2018-09-18 ENCOUNTER — OFFICE VISIT (OUTPATIENT)
Dept: OBSTETRICS AND GYNECOLOGY | Facility: CLINIC | Age: 28
End: 2018-09-18
Payer: MEDICAID

## 2018-09-18 VITALS
SYSTOLIC BLOOD PRESSURE: 150 MMHG | HEIGHT: 72 IN | WEIGHT: 293 LBS | BODY MASS INDEX: 39.68 KG/M2 | DIASTOLIC BLOOD PRESSURE: 96 MMHG

## 2018-09-18 DIAGNOSIS — O03.9 SPONTANEOUS ABORTION: Primary | ICD-10-CM

## 2018-09-18 PROCEDURE — 99213 OFFICE O/P EST LOW 20 MIN: CPT | Mod: S$PBB,TH,, | Performed by: OBSTETRICS & GYNECOLOGY

## 2018-09-18 PROCEDURE — 99213 OFFICE O/P EST LOW 20 MIN: CPT | Mod: PBBFAC,TH | Performed by: OBSTETRICS & GYNECOLOGY

## 2018-09-18 PROCEDURE — 99999 PR PBB SHADOW E&M-EST. PATIENT-LVL III: CPT | Mod: PBBFAC,,, | Performed by: OBSTETRICS & GYNECOLOGY

## 2018-09-18 RX ORDER — MISOPROSTOL 200 UG/1
400 TABLET ORAL EVERY 4 HOURS
Qty: 12 TABLET | Refills: 0 | Status: SHIPPED | OUTPATIENT
Start: 2018-09-18 | End: 2018-10-02 | Stop reason: ALTCHOICE

## 2018-09-18 RX ORDER — ACETAMINOPHEN AND CODEINE PHOSPHATE 300; 30 MG/1; MG/1
1 TABLET ORAL EVERY 6 HOURS PRN
Qty: 10 TABLET | Refills: 0 | Status: SHIPPED | OUTPATIENT
Start: 2018-09-18 | End: 2018-09-28

## 2018-09-18 NOTE — PROGRESS NOTES
Subjective:       Patient ID: Elena Valencia is a 28 y.o. female.    Chief Complaint:  Pelvic Pain      History of Present Illness  HPI  Patient comes in again today complaining of more bleeding and severe cramps.  Passage of clots.  But no tissue noted.    Still bleeding today.    Denies fever or chills.  No nausea or vomiting.  Quantitative beta HCG on 2018 was 55.  Repeat yesterday, on 2018 was 44.      GYN & OB History  Patient's last menstrual period was 2018 (exact date).   Date of Last Pap: No result found    OB History    Para Term  AB Living   1             SAB TAB Ectopic Multiple Live Births                  # Outcome Date GA Lbr Leonardo/2nd Weight Sex Delivery Anes PTL Lv   1                  Past Medical History:   Diagnosis Date    Anxiety     Back problem     Hypertension     IBS (irritable bowel syndrome)     Migraine headache     Obesity        History reviewed. No pertinent surgical history.    Family History   Problem Relation Age of Onset    Heart attack Paternal Grandfather     Cancer Paternal Grandmother 50        Pancreatic cancer    Emphysema Maternal Grandfather     Breast cancer Neg Hx     Colon cancer Neg Hx     Ovarian cancer Neg Hx        Social History     Socioeconomic History    Marital status: Single     Spouse name: None    Number of children: None    Years of education: None    Highest education level: None   Social Needs    Financial resource strain: None    Food insecurity - worry: None    Food insecurity - inability: None    Transportation needs - medical: None    Transportation needs - non-medical: None   Occupational History    None   Tobacco Use    Smoking status: Never Smoker    Smokeless tobacco: Never Used   Substance and Sexual Activity    Alcohol use: No     Comment: socially    Drug use: No    Sexual activity: Yes     Partners: Male     Birth control/protection: Condom   Other Topics Concern    None   Social  History Narrative    Together since 2010    He drives trucks    She is a        Current Outpatient Medications   Medication Sig Dispense Refill    dicyclomine (BENTYL) 10 MG capsule Take 10 mg by mouth 3 (three) times daily as needed (IBS).      ketoconazole (NIZORAL) 2 % shampoo USE SHAMPOO ONCE DAILY  2    lorazepam (ATIVAN) 1 MG tablet Take 1 tablet (1 mg total) by mouth every 6 (six) hours as needed for Anxiety. 6 tablet 0    metoprolol tartrate (LOPRESSOR) 50 MG tablet Take 50 mg by mouth once daily.      promethazine (PHENERGAN) 25 MG tablet Take 25 mg by mouth every 4 (four) hours as needed for Nausea (IBS).      valACYclovir (VALTREX) 1000 MG tablet Take 2,000 mg by mouth 2 (two) times daily.  0     No current facility-administered medications for this visit.        Review of patient's allergies indicates:   Allergen Reactions    Hydromorphone (bulk) Itching       Review of Systems  Review of Systems   Constitutional: Negative for activity change, appetite change, chills, fatigue, fever and unexpected weight change.   HENT: Negative for mouth sores.    Respiratory: Negative for cough, shortness of breath and wheezing.    Cardiovascular: Negative for chest pain and palpitations.   Gastrointestinal: Negative for abdominal pain, bloating, blood in stool, constipation, nausea and vomiting.   Endocrine: Negative for diabetes and hot flashes.   Genitourinary: Positive for pelvic pain and vaginal bleeding. Negative for dyspareunia, dysuria, frequency, hematuria, menorrhagia, menstrual problem, urgency, vaginal discharge, vaginal pain, dysmenorrhea, urinary incontinence, postcoital bleeding and vaginal odor.        Cramps    Musculoskeletal: Negative for back pain and myalgias.   Skin:  Negative for rash.   Neurological: Negative for seizures and headaches.   Psychiatric/Behavioral: Negative for depression and sleep disturbance. The patient is not nervous/anxious.    Breast: Negative for breast mass,  breast pain and nipple discharge          Objective:    Physical Exam:   Constitutional: She appears well-developed and well-nourished. No distress.   Obese      HENT:   Head: Normocephalic and atraumatic.    Eyes: EOM are normal.    Neck: Normal range of motion.     Pulmonary/Chest: Effort normal. No respiratory distress.        Abdominal: Soft. She exhibits no distension. There is no tenderness. There is no rebound and no guarding.     Genitourinary: Vagina normal and uterus normal. No vaginal discharge found.   Genitourinary Comments: Vulva without any obvious lesions.  Vaginal vault with good support.  Moderate amount of blood and clot in vault noted.  No obvious lesion.  Cervix is without any cervical motion tenderness.  No obvious lesion.  Os is closed.  Uterus is small, retroverted, non-tender, normal contour.  Adnexa is without any masses or tenderness.           Musculoskeletal: Normal range of motion.       Neurological: She is alert.    Skin: Skin is warm and dry.    Psychiatric: She has a normal mood and affect.          Assessment:        1. Spontaneous              Plan:      I have discussed with the patient regarding her condition  She is undergoing a miscarriage.  Cytotec 200 mcg orally every 4-6 hours for 24 hours  Tylenol #3 for pain.  #10 given  Patient can not take NSAIDs secondary to gastric ulcers  Back as previously scheduled.

## 2018-09-18 NOTE — TELEPHONE ENCOUNTER
----- Message from Lissy Wooten sent at 9/18/2018 11:32 AM CDT -----  Contact: Self  Pt is calling to speak with staff. She is experiencing pain and bleeding after miscarriage and has some questions. Please call pt at [phone].   --------------------------------------------------------------  9/18/18 @ 8148 (MATT)  SPOKE WITH MS GOLDSMITH, PT 'S APPT CHANGED TO TODAY 9/18/18 @ 3PM DUE TO PAIN BLEEDING

## 2018-10-02 ENCOUNTER — OFFICE VISIT (OUTPATIENT)
Dept: OBSTETRICS AND GYNECOLOGY | Facility: CLINIC | Age: 28
End: 2018-10-02
Payer: MEDICAID

## 2018-10-02 VITALS
TEMPERATURE: 99 F | HEIGHT: 72 IN | SYSTOLIC BLOOD PRESSURE: 142 MMHG | DIASTOLIC BLOOD PRESSURE: 96 MMHG | WEIGHT: 293 LBS | BODY MASS INDEX: 39.68 KG/M2

## 2018-10-02 DIAGNOSIS — O03.9 COMPLETE MISCARRIAGE: Primary | ICD-10-CM

## 2018-10-02 DIAGNOSIS — O03.9 SPONTANEOUS ABORTION: ICD-10-CM

## 2018-10-02 LAB
B-HCG UR QL: NEGATIVE
CTP QC/QA: YES

## 2018-10-02 PROCEDURE — 99999 PR PBB SHADOW E&M-EST. PATIENT-LVL III: CPT | Mod: PBBFAC,,, | Performed by: OBSTETRICS & GYNECOLOGY

## 2018-10-02 PROCEDURE — 99213 OFFICE O/P EST LOW 20 MIN: CPT | Mod: S$PBB,TH,, | Performed by: OBSTETRICS & GYNECOLOGY

## 2018-10-02 PROCEDURE — 99213 OFFICE O/P EST LOW 20 MIN: CPT | Mod: PBBFAC,TH | Performed by: OBSTETRICS & GYNECOLOGY

## 2018-10-02 PROCEDURE — 81025 URINE PREGNANCY TEST: CPT | Mod: PBBFAC | Performed by: OBSTETRICS & GYNECOLOGY

## 2018-10-02 NOTE — PROGRESS NOTES
Subjective:       Patient ID: Elena Valencia is a 28 y.o. female.    Chief Complaint:  Follow-up (Follow up miscarriage)      History of Present Illness  HPI  Patient comes in today for follow-up  Status post complete   UPT today is negative  No longer with vaginal bleeding  Minimal pain on the left side.      GYN & OB History  No LMP recorded. Patient is not currently having periods (Reason: Other).   Date of Last Pap: No result found    OB History    Para Term  AB Living   1             SAB TAB Ectopic Multiple Live Births                  # Outcome Date GA Lbr Leonardo/2nd Weight Sex Delivery Anes PTL Lv   1                  Past Medical History:   Diagnosis Date    Anxiety     Back problem     Hypertension     IBS (irritable bowel syndrome)     Migraine headache     Obesity        History reviewed. No pertinent surgical history.    Family History   Problem Relation Age of Onset    Heart attack Paternal Grandfather     Cancer Paternal Grandmother 50        Pancreatic cancer    Emphysema Maternal Grandfather     Breast cancer Neg Hx     Colon cancer Neg Hx     Ovarian cancer Neg Hx        Social History     Socioeconomic History    Marital status: Single     Spouse name: None    Number of children: None    Years of education: None    Highest education level: None   Social Needs    Financial resource strain: None    Food insecurity - worry: None    Food insecurity - inability: None    Transportation needs - medical: None    Transportation needs - non-medical: None   Occupational History    None   Tobacco Use    Smoking status: Never Smoker    Smokeless tobacco: Never Used   Substance and Sexual Activity    Alcohol use: No     Comment: socially    Drug use: No    Sexual activity: Yes     Partners: Male     Birth control/protection: Condom   Other Topics Concern    None   Social History Narrative    Together since     He drives trucks    She is a         Current Outpatient Medications   Medication Sig Dispense Refill    dicyclomine (BENTYL) 10 MG capsule Take 10 mg by mouth 3 (three) times daily as needed (IBS).      ketoconazole (NIZORAL) 2 % shampoo USE SHAMPOO ONCE DAILY  2    lorazepam (ATIVAN) 1 MG tablet Take 1 tablet (1 mg total) by mouth every 6 (six) hours as needed for Anxiety. 6 tablet 0    metoprolol tartrate (LOPRESSOR) 50 MG tablet Take 50 mg by mouth once daily.      promethazine (PHENERGAN) 25 MG tablet Take 25 mg by mouth every 4 (four) hours as needed for Nausea (IBS).      valACYclovir (VALTREX) 1000 MG tablet Take 2,000 mg by mouth 2 (two) times daily.  0     No current facility-administered medications for this visit.        Review of patient's allergies indicates:   Allergen Reactions    Hydromorphone (bulk) Itching       Review of Systems  Review of Systems   Constitutional: Negative for activity change, appetite change, chills, fatigue, fever and unexpected weight change.   HENT: Negative for mouth sores.    Respiratory: Negative for cough, shortness of breath and wheezing.    Cardiovascular: Negative for chest pain and palpitations.   Gastrointestinal: Negative for abdominal pain, bloating, blood in stool, constipation, nausea and vomiting.   Endocrine: Negative for diabetes and hot flashes.   Genitourinary: Negative for dyspareunia, dysuria, frequency, hematuria, menorrhagia, menstrual problem, pelvic pain, urgency, vaginal bleeding, vaginal discharge, vaginal pain, dysmenorrhea, urinary incontinence, postcoital bleeding and vaginal odor.   Musculoskeletal: Negative for back pain and myalgias.   Skin:  Negative for rash.   Neurological: Negative for seizures and headaches.   Psychiatric/Behavioral: Negative for depression and sleep disturbance. The patient is not nervous/anxious.    Breast: Negative for breast mass, breast pain and nipple discharge          Objective:    Physical Exam:   Constitutional: She appears  well-developed and well-nourished. No distress.   Obese      HENT:   Head: Normocephalic and atraumatic.    Eyes: EOM are normal.    Neck: Normal range of motion.     Pulmonary/Chest: Effort normal. No respiratory distress.   Breasts: Non-tender, no engorgement, no masses, no retraction, no discharge. Negative for lymphadenopathy.         Abdominal: Soft. She exhibits no distension. There is no tenderness. There is no rebound and no guarding.     Genitourinary: Vagina normal and uterus normal. No vaginal discharge found.   Genitourinary Comments: Vulva without any obvious lesions.  Vaginal vault with good support.  Minimal white discharge noted.  No obvious lesion.  Cervix is without any cervical motion tenderness.  No obvious lesion.  Uterus is small, non-tender, normal contour.  Adnexa is without any masses or tenderness.           Musculoskeletal: Normal range of motion.       Neurological: She is alert.    Skin: Skin is warm and dry.    Psychiatric: She has a normal mood and affect.          Assessment:        1. Complete miscarriage    2. Spontaneous                 Plan:      I have discussed with the patient regarding her condition  She is doing well recovering from her miscarriage    We discussed future fertility  I would suggest her losing some weight prior to trying to get pregnant.  She would try to have a low calorie diet and increase her physical activities  Back in 3-6 months for follow-up  Sooner if she would need us.

## 2018-10-04 ENCOUNTER — PATIENT MESSAGE (OUTPATIENT)
Dept: OBSTETRICS AND GYNECOLOGY | Facility: CLINIC | Age: 28
End: 2018-10-04

## 2018-10-09 ENCOUNTER — TELEPHONE (OUTPATIENT)
Dept: OBSTETRICS AND GYNECOLOGY | Facility: CLINIC | Age: 28
End: 2018-10-09

## 2018-10-09 NOTE — TELEPHONE ENCOUNTER
----- Message from Milagros Mendoza sent at 10/9/2018  1:51 PM CDT -----  Contact: self  Please call pt to discuss pain in side. Pt states she was seen by primary care and they couldn't find anything. Please call [phone].  -----------------------------------------------------  10/9/18 @ 9982 ([facility])  SPOKE WITH MS GOLDSMITH, APPT MADE WITH DR RODRIGUEZ ON 10/12/158 @ 1110

## 2018-10-10 ENCOUNTER — PATIENT MESSAGE (OUTPATIENT)
Dept: OBSTETRICS AND GYNECOLOGY | Facility: CLINIC | Age: 28
End: 2018-10-10

## 2019-01-03 ENCOUNTER — PATIENT MESSAGE (OUTPATIENT)
Dept: OBSTETRICS AND GYNECOLOGY | Facility: CLINIC | Age: 29
End: 2019-01-03

## 2019-01-03 ENCOUNTER — OFFICE VISIT (OUTPATIENT)
Dept: OBSTETRICS AND GYNECOLOGY | Facility: CLINIC | Age: 29
End: 2019-01-03
Payer: MEDICAID

## 2019-01-03 ENCOUNTER — LAB VISIT (OUTPATIENT)
Dept: LAB | Facility: HOSPITAL | Age: 29
End: 2019-01-03
Attending: OBSTETRICS & GYNECOLOGY
Payer: MEDICAID

## 2019-01-03 VITALS
TEMPERATURE: 99 F | WEIGHT: 293 LBS | SYSTOLIC BLOOD PRESSURE: 154 MMHG | DIASTOLIC BLOOD PRESSURE: 84 MMHG | BODY MASS INDEX: 39.68 KG/M2 | HEIGHT: 72 IN

## 2019-01-03 DIAGNOSIS — Z32.00 ENCOUNTER FOR PREGNANCY TEST, RESULT UNKNOWN: ICD-10-CM

## 2019-01-03 DIAGNOSIS — Z32.00 ENCOUNTER FOR PREGNANCY TEST, RESULT UNKNOWN: Primary | ICD-10-CM

## 2019-01-03 LAB
B-HCG UR QL: NEGATIVE
CTP QC/QA: YES
HCG INTACT+B SERPL-ACNC: <1.2 MIU/ML

## 2019-01-03 PROCEDURE — 99999 PR PBB SHADOW E&M-EST. PATIENT-LVL II: CPT | Mod: PBBFAC,,, | Performed by: OBSTETRICS & GYNECOLOGY

## 2019-01-03 PROCEDURE — 99213 OFFICE O/P EST LOW 20 MIN: CPT | Mod: S$PBB,,, | Performed by: OBSTETRICS & GYNECOLOGY

## 2019-01-03 PROCEDURE — 36415 COLL VENOUS BLD VENIPUNCTURE: CPT

## 2019-01-03 PROCEDURE — 81025 URINE PREGNANCY TEST: CPT | Mod: PBBFAC | Performed by: OBSTETRICS & GYNECOLOGY

## 2019-01-03 PROCEDURE — 99213 PR OFFICE/OUTPT VISIT, EST, LEVL III, 20-29 MIN: ICD-10-PCS | Mod: S$PBB,,, | Performed by: OBSTETRICS & GYNECOLOGY

## 2019-01-03 PROCEDURE — 84702 CHORIONIC GONADOTROPIN TEST: CPT

## 2019-01-03 PROCEDURE — 99212 OFFICE O/P EST SF 10 MIN: CPT | Mod: PBBFAC | Performed by: OBSTETRICS & GYNECOLOGY

## 2019-01-03 PROCEDURE — 99999 PR PBB SHADOW E&M-EST. PATIENT-LVL II: ICD-10-PCS | Mod: PBBFAC,,, | Performed by: OBSTETRICS & GYNECOLOGY

## 2019-01-03 RX ORDER — MEDROXYPROGESTERONE ACETATE 10 MG/1
TABLET ORAL
Refills: 5 | COMMUNITY
Start: 2018-11-01 | End: 2019-06-07

## 2019-01-03 NOTE — PROGRESS NOTES
Subjective:       Patient ID: Elena Valencia is a 28 y.o. female.    Chief Complaint:  Follow-up (follow up miscarriage, pt would like to try again)      History of Present Illness  HPI  Patient comes in today, wondering if she is pregnant.  Status post spontaneous  in 2018, last year.  Has had regular menses since.  Next one should be this coming Monday, 2019.  Thinks she is pregnant.  However, the home urine pregnancy test yesterday, 19 was faintly positive.  It was negative this morning.    She would like to get a blood pregnancy test.    She also has pelvic pain, geneva in between menses (?)      GYN & OB History  Patient's last menstrual period was 12/10/2018 (exact date).   Date of Last Pap: No result found    OB History    Para Term  AB Living   1             SAB TAB Ectopic Multiple Live Births                  # Outcome Date GA Lbr Leonardo/2nd Weight Sex Delivery Anes PTL Lv   1                  Past Medical History:   Diagnosis Date    Anxiety     Back problem     Hypertension     IBS (irritable bowel syndrome)     Migraine headache     Obesity        History reviewed. No pertinent surgical history.    Family History   Problem Relation Age of Onset    Heart attack Paternal Grandfather     Cancer Paternal Grandmother 50        Pancreatic cancer    Emphysema Maternal Grandfather     Breast cancer Neg Hx     Colon cancer Neg Hx     Ovarian cancer Neg Hx        Social History     Socioeconomic History    Marital status: Single     Spouse name: None    Number of children: None    Years of education: None    Highest education level: None   Social Needs    Financial resource strain: None    Food insecurity - worry: None    Food insecurity - inability: None    Transportation needs - medical: None    Transportation needs - non-medical: None   Occupational History    None   Tobacco Use    Smoking status: Never Smoker    Smokeless tobacco: Never Used    Substance and Sexual Activity    Alcohol use: No     Comment: socially    Drug use: No    Sexual activity: Yes     Partners: Male     Birth control/protection: Condom   Other Topics Concern    None   Social History Narrative    Together since 2010    He drives trucks    She is a        Current Outpatient Medications   Medication Sig Dispense Refill    dicyclomine (BENTYL) 10 MG capsule Take 10 mg by mouth 3 (three) times daily as needed (IBS).      ketoconazole (NIZORAL) 2 % shampoo USE SHAMPOO ONCE DAILY  2    lorazepam (ATIVAN) 1 MG tablet Take 1 tablet (1 mg total) by mouth every 6 (six) hours as needed for Anxiety. 6 tablet 0    medroxyPROGESTERone (PROVERA) 10 MG tablet TAKE ONE TABLET BY MOUTH ONCE DAILY FOR 10 DAYS EVERY MONTH  5    metoprolol tartrate (LOPRESSOR) 50 MG tablet Take 50 mg by mouth once daily.      promethazine (PHENERGAN) 25 MG tablet Take 25 mg by mouth every 4 (four) hours as needed for Nausea (IBS).      valACYclovir (VALTREX) 1000 MG tablet Take 2,000 mg by mouth 2 (two) times daily.  0     No current facility-administered medications for this visit.        Review of patient's allergies indicates:   Allergen Reactions    Hydromorphone (bulk) Itching       Review of Systems  Review of Systems   Constitutional: Positive for fatigue. Negative for activity change, appetite change, chills, fever and unexpected weight change.   HENT: Negative for mouth sores.    Respiratory: Negative for cough, shortness of breath and wheezing.    Cardiovascular: Negative for chest pain and palpitations.   Gastrointestinal: Negative for abdominal pain, bloating, blood in stool, constipation, nausea and vomiting.   Endocrine: Negative for diabetes and hot flashes.   Genitourinary: Negative for dyspareunia, dysuria, frequency, hematuria, menorrhagia, menstrual problem, pelvic pain, urgency, vaginal bleeding, vaginal discharge, vaginal pain, dysmenorrhea, urinary incontinence, postcoital  bleeding and vaginal odor.   Musculoskeletal: Negative for back pain and myalgias.   Neurological: Negative for seizures and headaches.   Psychiatric/Behavioral: Negative for depression and sleep disturbance. The patient is not nervous/anxious.    Breast: Negative for mass, mastodynia and nipple discharge          Objective:    Physical Exam:   Constitutional: She appears well-developed and well-nourished. No distress.    HENT:   Head: Normocephalic and atraumatic.    Eyes: EOM are normal.    Neck: Normal range of motion.    Cardiovascular: Normal rate.     Pulmonary/Chest: Effort normal. No respiratory distress.        Abdominal: Soft. She exhibits no distension. There is no tenderness. There is no rebound and no guarding.             Musculoskeletal: Normal range of motion.       Neurological: She is alert.    Skin: Skin is warm and dry.    Psychiatric: She has a normal mood and affect.        UPT negative today in the office     Assessment:        1. Encounter for pregnancy test, result unknown    2.  Pelvic pain        Plan:      I have discussed with the patient regarding her condition.  She does not appear to be pregnant  Quantitative beta HCG.  If not pregnant, would give naproxen for pain  Back as needed.

## 2019-01-07 ENCOUNTER — PATIENT MESSAGE (OUTPATIENT)
Dept: OBSTETRICS AND GYNECOLOGY | Facility: CLINIC | Age: 29
End: 2019-01-07

## 2019-01-16 ENCOUNTER — PATIENT MESSAGE (OUTPATIENT)
Dept: OBSTETRICS AND GYNECOLOGY | Facility: CLINIC | Age: 29
End: 2019-01-16

## 2019-01-16 DIAGNOSIS — E28.2 PCOS (POLYCYSTIC OVARIAN SYNDROME): Primary | ICD-10-CM

## 2019-01-16 RX ORDER — METFORMIN HYDROCHLORIDE 500 MG/1
500 TABLET ORAL
Qty: 30 TABLET | Refills: 6 | Status: SHIPPED | OUTPATIENT
Start: 2019-01-16 | End: 2019-06-07

## 2019-05-13 ENCOUNTER — HOSPITAL ENCOUNTER (EMERGENCY)
Facility: HOSPITAL | Age: 29
Discharge: HOME | End: 2019-05-13
Attending: EMERGENCY MEDICINE
Payer: COMMERCIAL

## 2019-05-13 VITALS
OXYGEN SATURATION: 98 % | DIASTOLIC BLOOD PRESSURE: 65 MMHG | RESPIRATION RATE: 18 BRPM | HEIGHT: 72 IN | BODY MASS INDEX: 39.68 KG/M2 | HEART RATE: 86 BPM | SYSTOLIC BLOOD PRESSURE: 161 MMHG | WEIGHT: 293 LBS | TEMPERATURE: 98 F

## 2019-05-13 DIAGNOSIS — F41.9 ANXIETY: ICD-10-CM

## 2019-05-13 DIAGNOSIS — F43.0 STRESS REACTION: ICD-10-CM

## 2019-05-13 DIAGNOSIS — R07.9 CHEST PAIN: Primary | ICD-10-CM

## 2019-05-13 DIAGNOSIS — I10 HYPERTENSION, UNSPECIFIED TYPE: ICD-10-CM

## 2019-05-13 LAB
ALBUMIN SERPL BCP-MCNC: 3.6 G/DL (ref 3.5–5.2)
ALP SERPL-CCNC: 54 U/L (ref 55–135)
ALT SERPL W/O P-5'-P-CCNC: 20 U/L (ref 10–44)
ANION GAP SERPL CALC-SCNC: 6 MMOL/L (ref 8–16)
AST SERPL-CCNC: 14 U/L (ref 10–40)
B-HCG UR QL: NEGATIVE
BASOPHILS # BLD AUTO: 0.02 K/UL (ref 0–0.2)
BASOPHILS NFR BLD: 0.4 % (ref 0–1.9)
BILIRUB SERPL-MCNC: 0.4 MG/DL (ref 0.1–1)
BNP SERPL-MCNC: 20 PG/ML (ref 0–99)
BUN SERPL-MCNC: 9 MG/DL (ref 6–20)
CALCIUM SERPL-MCNC: 9.5 MG/DL (ref 8.7–10.5)
CHLORIDE SERPL-SCNC: 107 MMOL/L (ref 95–110)
CO2 SERPL-SCNC: 27 MMOL/L (ref 23–29)
CREAT SERPL-MCNC: 0.7 MG/DL (ref 0.5–1.4)
CTP QC/QA: YES
D DIMER PPP IA.FEU-MCNC: <0.19 MG/L FEU
DIFFERENTIAL METHOD: ABNORMAL
EOSINOPHIL # BLD AUTO: 0 K/UL (ref 0–0.5)
EOSINOPHIL NFR BLD: 0.7 % (ref 0–8)
ERYTHROCYTE [DISTWIDTH] IN BLOOD BY AUTOMATED COUNT: 13.6 % (ref 11.5–14.5)
EST. GFR  (AFRICAN AMERICAN): >60 ML/MIN/1.73 M^2
EST. GFR  (NON AFRICAN AMERICAN): >60 ML/MIN/1.73 M^2
GLUCOSE SERPL-MCNC: 82 MG/DL (ref 70–110)
GLUCOSE SERPL-MCNC: 95 MG/DL (ref 70–110)
HCT VFR BLD AUTO: 38.8 % (ref 37–48.5)
HGB BLD-MCNC: 12.2 G/DL (ref 12–16)
LYMPHOCYTES # BLD AUTO: 2.2 K/UL (ref 1–4.8)
LYMPHOCYTES NFR BLD: 39.4 % (ref 18–48)
MCH RBC QN AUTO: 27.1 PG (ref 27–31)
MCHC RBC AUTO-ENTMCNC: 31.4 G/DL (ref 32–36)
MCV RBC AUTO: 86 FL (ref 82–98)
MONOCYTES # BLD AUTO: 0.5 K/UL (ref 0.3–1)
MONOCYTES NFR BLD: 8.1 % (ref 4–15)
NEUTROPHILS # BLD AUTO: 2.9 K/UL (ref 1.8–7.7)
NEUTROPHILS NFR BLD: 51.4 % (ref 38–73)
PLATELET # BLD AUTO: 302 K/UL (ref 150–350)
PMV BLD AUTO: 9.4 FL (ref 9.2–12.9)
POTASSIUM SERPL-SCNC: 3.7 MMOL/L (ref 3.5–5.1)
PROT SERPL-MCNC: 6.8 G/DL (ref 6–8.4)
RBC # BLD AUTO: 4.51 M/UL (ref 4–5.4)
SODIUM SERPL-SCNC: 140 MMOL/L (ref 136–145)
TROPONIN I SERPL DL<=0.01 NG/ML-MCNC: <0.006 NG/ML (ref 0–0.03)
WBC # BLD AUTO: 5.58 K/UL (ref 3.9–12.7)

## 2019-05-13 PROCEDURE — 99285 EMERGENCY DEPT VISIT HI MDM: CPT | Mod: 25

## 2019-05-13 PROCEDURE — 84484 ASSAY OF TROPONIN QUANT: CPT

## 2019-05-13 PROCEDURE — 93010 ELECTROCARDIOGRAM REPORT: CPT | Mod: ,,, | Performed by: INTERNAL MEDICINE

## 2019-05-13 PROCEDURE — 93010 EKG 12-LEAD: ICD-10-PCS | Mod: ,,, | Performed by: INTERNAL MEDICINE

## 2019-05-13 PROCEDURE — 93005 ELECTROCARDIOGRAM TRACING: CPT

## 2019-05-13 PROCEDURE — 96374 THER/PROPH/DIAG INJ IV PUSH: CPT

## 2019-05-13 PROCEDURE — 96376 TX/PRO/DX INJ SAME DRUG ADON: CPT

## 2019-05-13 PROCEDURE — 85379 FIBRIN DEGRADATION QUANT: CPT

## 2019-05-13 PROCEDURE — 85025 COMPLETE CBC W/AUTO DIFF WBC: CPT

## 2019-05-13 PROCEDURE — 81025 URINE PREGNANCY TEST: CPT | Performed by: NURSE PRACTITIONER

## 2019-05-13 PROCEDURE — 25000003 PHARM REV CODE 250: Performed by: EMERGENCY MEDICINE

## 2019-05-13 PROCEDURE — 63600175 PHARM REV CODE 636 W HCPCS: Performed by: EMERGENCY MEDICINE

## 2019-05-13 PROCEDURE — 96375 TX/PRO/DX INJ NEW DRUG ADDON: CPT

## 2019-05-13 PROCEDURE — 80053 COMPREHEN METABOLIC PANEL: CPT

## 2019-05-13 PROCEDURE — 83880 ASSAY OF NATRIURETIC PEPTIDE: CPT

## 2019-05-13 RX ORDER — ONDANSETRON 2 MG/ML
8 INJECTION INTRAMUSCULAR; INTRAVENOUS
Status: COMPLETED | OUTPATIENT
Start: 2019-05-13 | End: 2019-05-13

## 2019-05-13 RX ORDER — LOSARTAN POTASSIUM AND HYDROCHLOROTHIAZIDE 12.5; 5 MG/1; MG/1
1 TABLET ORAL DAILY
Qty: 90 TABLET | Refills: 0 | Status: SHIPPED | OUTPATIENT
Start: 2019-05-13 | End: 2019-06-07

## 2019-05-13 RX ORDER — HYDROXYZINE PAMOATE 50 MG/1
50 CAPSULE ORAL EVERY 6 HOURS PRN
Qty: 30 CAPSULE | Refills: 0 | Status: SHIPPED | OUTPATIENT
Start: 2019-05-13 | End: 2019-09-21

## 2019-05-13 RX ORDER — LABETALOL HYDROCHLORIDE 5 MG/ML
10 INJECTION, SOLUTION INTRAVENOUS
Status: COMPLETED | OUTPATIENT
Start: 2019-05-13 | End: 2019-05-13

## 2019-05-13 RX ORDER — HYDRALAZINE HYDROCHLORIDE 20 MG/ML
10 INJECTION INTRAMUSCULAR; INTRAVENOUS
Status: COMPLETED | OUTPATIENT
Start: 2019-05-13 | End: 2019-05-13

## 2019-05-13 RX ORDER — LORAZEPAM 0.5 MG/1
1 TABLET ORAL
Status: COMPLETED | OUTPATIENT
Start: 2019-05-13 | End: 2019-05-13

## 2019-05-13 RX ADMIN — HYDRALAZINE HYDROCHLORIDE 10 MG: 20 INJECTION INTRAMUSCULAR; INTRAVENOUS at 04:05

## 2019-05-13 RX ADMIN — LORAZEPAM 1 MG: 0.5 TABLET ORAL at 02:05

## 2019-05-13 RX ADMIN — LABETALOL HYDROCHLORIDE 10 MG: 5 INJECTION INTRAVENOUS at 04:05

## 2019-05-13 RX ADMIN — ONDANSETRON 8 MG: 2 INJECTION INTRAMUSCULAR; INTRAVENOUS at 05:05

## 2019-05-13 RX ADMIN — HYDRALAZINE HYDROCHLORIDE 10 MG: 20 INJECTION INTRAMUSCULAR; INTRAVENOUS at 01:05

## 2019-05-13 NOTE — ED TRIAGE NOTES
Patient states a history of anxiety and went to neurologist over a week ago & was prescribed a new daily medication but hasn't been able to afford to purchase it as of yet. Started with chest pain this am then began with nausea, sweating and dizziness. Denies vomiting/diarrhea. Placed on monitor. ECG obtained in triage.

## 2019-05-13 NOTE — ED PROVIDER NOTES
Encounter Date: 5/13/2019    SCRIBE #1 NOTE: I, Rom España, am scribing for, and in the presence of,  Claude Willson MD. I have scribed the following portions of the note - Other sections scribed: HPI and ROS.       History     Chief Complaint   Patient presents with    Chest Pain     Pt. arrives to ER reports chest pain for last few days, also complains of dry mouth, and nausea. Indicates CP is to left side does not radiate rates pain 4/10.     CC: Chest Pain    HPI: Patient is a 28-year-old female PMHx HTN, migraines, IBS, anxiety who presents with constant midsternal chest pain that began at approximately 07:00 this morning. Patient characterizes her pain as stabbing. She states the pain does not radiate anywhere. She denies modifying factors. It is not worse with palpation. She endorses feeling lightheaded shortly after onset of pain. This is constant. She also reports intermittent diaphoresis and nausea. Patient notes she has been stressed for the past 2 weeks. She admits to feeling anxious this morning. She denies suicidal ideas, homicidal ideas, hallucinations. She denies emesis, swelling, shortness of breath. She denies smoking, EtOH.     The history is provided by the patient. No  was used.     Review of patient's allergies indicates:   Allergen Reactions    Hydromorphone (bulk) Itching     Past Medical History:   Diagnosis Date    Anxiety     Back problem     Hypertension     IBS (irritable bowel syndrome)     Migraine headache     Obesity      History reviewed. No pertinent surgical history.  Family History   Problem Relation Age of Onset    Heart attack Paternal Grandfather     Cancer Paternal Grandmother 50        Pancreatic cancer    Emphysema Maternal Grandfather     Breast cancer Neg Hx     Colon cancer Neg Hx     Ovarian cancer Neg Hx      Social History     Tobacco Use    Smoking status: Never Smoker    Smokeless tobacco: Never Used   Substance Use Topics     Alcohol use: No     Comment: socially    Drug use: No     Review of Systems   Constitutional: Positive for diaphoresis.   Respiratory: Negative for shortness of breath.    Cardiovascular: Positive for chest pain.   Gastrointestinal: Positive for nausea. Negative for vomiting.   Musculoskeletal: Negative for joint swelling.   Neurological: Positive for light-headedness.   Psychiatric/Behavioral: Negative for hallucinations and suicidal ideas.        (-) homicidal ideas   All other systems reviewed and are negative.      Physical Exam     Initial Vitals [05/13/19 1217]   BP Pulse Resp Temp SpO2   (!) 198/94 76 16 98.7 °F (37.1 °C) 99 %      MAP       --         Physical Exam    Nursing note and vitals reviewed.  Constitutional: She appears well-developed and well-nourished.   HENT:   Head: Atraumatic.   Eyes: EOM are normal. Pupils are equal, round, and reactive to light.   Neck: Normal range of motion. Neck supple. No thyromegaly present. No JVD present.   Cardiovascular: Normal rate, regular rhythm, normal heart sounds and intact distal pulses. Exam reveals no gallop and no friction rub.    No murmur heard.  Pulmonary/Chest: Breath sounds normal. No respiratory distress. She exhibits no tenderness.   Abdominal: Soft. Bowel sounds are normal. There is no tenderness.   Musculoskeletal: Normal range of motion. She exhibits no edema or tenderness.   Neurological: She is alert and oriented to person, place, and time. She has normal strength. GCS score is 15. GCS eye subscore is 4. GCS verbal subscore is 5. GCS motor subscore is 6.   Skin: Skin is warm and dry. Capillary refill takes less than 2 seconds.   Psychiatric:   Appears anxious and tearful. Denies SI, HI, hallucinations. Does not appear gravely disabled.          ED Course   Procedures  Labs Reviewed   CBC W/ AUTO DIFFERENTIAL - Abnormal; Notable for the following components:       Result Value    Mean Corpuscular Hemoglobin Conc 31.4 (*)     All other  components within normal limits   COMPREHENSIVE METABOLIC PANEL - Abnormal; Notable for the following components:    Alkaline Phosphatase 54 (*)     Anion Gap 6 (*)     All other components within normal limits   TROPONIN I   B-TYPE NATRIURETIC PEPTIDE   D DIMER, QUANTITATIVE   POCT URINE PREGNANCY   POCT GLUCOSE MONITORING CONTINUOUS          Imaging Results          X-Ray Chest PA And Lateral (Final result)  Result time 05/13/19 15:43:08    Final result by Hans Mancia DO (05/13/19 15:43:08)                 Impression:      See above      Electronically signed by: Hans Mancia DO  Date:    05/13/2019  Time:    15:43             Narrative:    EXAMINATION:  XR CHEST PA AND LATERAL    CLINICAL HISTORY:  Chest Pain;    TECHNIQUE:  PA and lateral views of the chest were performed.    COMPARISON:  None    FINDINGS:  Small lung volumes likely related to poor inspiratory effort there is superimposed nonspecific elevation right lung base.  No lung consolidation.  No pleural effusion or pneumothorax.  Cardiothymic silhouette within normal limits.  Visualized osseous structures grossly intact.                               No evidence of MI, PE, pericarditis. Likely stress reaction. Patient does have severe hypertension. Will add Losartan/HCTZ and refer back to PCP.              Scribe Attestation:   Scribe #1: I performed the above scribed service and the documentation accurately describes the services I performed. I attest to the accuracy of the note.    Attending Attestation:           Physician Attestation for Scribe:  Physician Attestation Statement for Scribe #1: I, lCaude Willson MD, reviewed documentation, as scribed by Rom España in my presence, and it is both accurate and complete.                    Clinical Impression:       ICD-10-CM ICD-9-CM   1. Chest pain R07.9 786.50   2. Stress reaction F43.0 308.9   3. Anxiety F41.9 300.00   4. Hypertension, unspecified type I10 401.9                                 Claude Willson MD  05/13/19 6430

## 2019-05-16 ENCOUNTER — NURSE TRIAGE (OUTPATIENT)
Dept: ADMINISTRATIVE | Facility: CLINIC | Age: 29
End: 2019-05-16

## 2019-05-17 LAB — POCT GLUCOSE: 82 MG/DL (ref 70–110)

## 2019-05-17 NOTE — TELEPHONE ENCOUNTER
Patient called to report the following:     -seen in ER on Monday for high blood pressure   -had dizziness, lightheadedness  -bp 142/108  -bp does go down after taking medication, but still has headache   -denies weakness, numbness, difficulty breathing,   -advised to report to ER    Reason for Disposition   [1] Systolic BP  >= 160 OR Diastolic >= 100 AND [2] cardiac or neurologic symptoms (e.g., chest pain, difficulty breathing, unsteady gait, blurred vision)    Protocols used: HIGH BLOOD PRESSURE-A-AH

## 2019-05-27 ENCOUNTER — HOSPITAL ENCOUNTER (EMERGENCY)
Facility: HOSPITAL | Age: 29
Discharge: LEFT BEFORE DISCHARGE | End: 2019-05-27
Payer: COMMERCIAL

## 2019-05-27 VITALS
SYSTOLIC BLOOD PRESSURE: 164 MMHG | RESPIRATION RATE: 20 BRPM | TEMPERATURE: 99 F | BODY MASS INDEX: 39.68 KG/M2 | OXYGEN SATURATION: 96 % | HEIGHT: 72 IN | HEART RATE: 99 BPM | WEIGHT: 293 LBS | DIASTOLIC BLOOD PRESSURE: 88 MMHG

## 2019-05-27 DIAGNOSIS — R07.9 CHEST PAIN: ICD-10-CM

## 2019-05-27 PROCEDURE — 93010 ELECTROCARDIOGRAM REPORT: CPT | Mod: ,,, | Performed by: INTERNAL MEDICINE

## 2019-05-27 PROCEDURE — 93005 ELECTROCARDIOGRAM TRACING: CPT

## 2019-05-27 PROCEDURE — 93010 EKG 12-LEAD: ICD-10-PCS | Mod: ,,, | Performed by: INTERNAL MEDICINE

## 2019-05-27 PROCEDURE — 99900041 HC LEFT WITHOUT BEING SEEN- EMERGENCY

## 2019-06-07 ENCOUNTER — TELEPHONE (OUTPATIENT)
Dept: OBSTETRICS AND GYNECOLOGY | Facility: CLINIC | Age: 29
End: 2019-06-07

## 2019-06-07 ENCOUNTER — OFFICE VISIT (OUTPATIENT)
Dept: OBSTETRICS AND GYNECOLOGY | Facility: CLINIC | Age: 29
End: 2019-06-07
Payer: COMMERCIAL

## 2019-06-07 VITALS
SYSTOLIC BLOOD PRESSURE: 142 MMHG | DIASTOLIC BLOOD PRESSURE: 88 MMHG | HEIGHT: 72 IN | WEIGHT: 293 LBS | BODY MASS INDEX: 39.68 KG/M2

## 2019-06-07 DIAGNOSIS — E28.2 PCOS (POLYCYSTIC OVARIAN SYNDROME): Primary | ICD-10-CM

## 2019-06-07 DIAGNOSIS — Z01.419 WELL WOMAN EXAM WITH ROUTINE GYNECOLOGICAL EXAM: Primary | ICD-10-CM

## 2019-06-07 DIAGNOSIS — E28.2 PCOS (POLYCYSTIC OVARIAN SYNDROME): ICD-10-CM

## 2019-06-07 PROCEDURE — 99999 PR PBB SHADOW E&M-EST. PATIENT-LVL III: CPT | Mod: PBBFAC,,, | Performed by: OBSTETRICS & GYNECOLOGY

## 2019-06-07 PROCEDURE — 99999 PR PBB SHADOW E&M-EST. PATIENT-LVL III: ICD-10-PCS | Mod: PBBFAC,,, | Performed by: OBSTETRICS & GYNECOLOGY

## 2019-06-07 PROCEDURE — 88142 CYTOPATH C/V THIN LAYER: CPT

## 2019-06-07 PROCEDURE — 99395 PR PREVENTIVE VISIT,EST,18-39: ICD-10-PCS | Mod: S$GLB,,, | Performed by: OBSTETRICS & GYNECOLOGY

## 2019-06-07 PROCEDURE — 87624 HPV HI-RISK TYP POOLED RSLT: CPT

## 2019-06-07 PROCEDURE — 99395 PREV VISIT EST AGE 18-39: CPT | Mod: S$GLB,,, | Performed by: OBSTETRICS & GYNECOLOGY

## 2019-06-07 RX ORDER — METFORMIN HYDROCHLORIDE 500 MG/1
500 TABLET ORAL
Qty: 30 TABLET | Refills: 0 | Status: SHIPPED | OUTPATIENT
Start: 2019-06-07 | End: 2019-10-02

## 2019-06-07 RX ORDER — LORAZEPAM 0.5 MG/1
0.5 TABLET ORAL 2 TIMES DAILY
Refills: 2 | COMMUNITY
Start: 2019-05-17 | End: 2019-09-21

## 2019-06-07 RX ORDER — HYDROCHLOROTHIAZIDE 12.5 MG/1
TABLET ORAL
Refills: 5 | COMMUNITY
Start: 2019-05-24 | End: 2019-09-21

## 2019-06-07 RX ORDER — HYDROQUINONE 40 MG/G
CREAM TOPICAL
Refills: 3 | COMMUNITY
Start: 2019-04-23 | End: 2019-09-21

## 2019-06-07 RX ORDER — DIAZEPAM 5 MG/1
TABLET ORAL
Refills: 0 | COMMUNITY
Start: 2019-05-10 | End: 2019-09-21

## 2019-06-07 RX ORDER — TOPIRAMATE 50 MG/1
50 TABLET, FILM COATED ORAL 2 TIMES DAILY WITH MEALS
Refills: 0 | COMMUNITY
Start: 2019-05-01 | End: 2022-11-01

## 2019-06-07 RX ORDER — NIFEDIPINE 30 MG/1
TABLET, EXTENDED RELEASE ORAL
COMMUNITY
Start: 2019-05-20 | End: 2020-06-11

## 2019-06-07 RX ORDER — CEFUROXIME AXETIL 250 MG/1
TABLET ORAL
COMMUNITY
End: 2019-06-07

## 2019-06-07 NOTE — PROGRESS NOTES
Subjective:       Patient ID: Elena Valencia is a 28 y.o. female.    Chief Complaint:  Well Woman (Pap/ Trying to conceive )      History of Present Illness  HPI  Annual Exam-Premenopausal  Patient presents for annual exam. The patient has no complaints today. The patient is sexually active. GYN screening history: last pap: approximate date 2016 and was normal. The patient wears seatbelts: yes. The patient participates in regular exercise: no. Has the patient ever been transfused or tattooed?: yes. The patient reports that there is not domestic violence in her life.    She has been trying to get pregnant.  Status post spontaneous .  History of PCOS.  Has been unsuccessfully trying to lose weight.       GYN & OB History  Patient's last menstrual period was 2019 (exact date).   Date of Last Pap: No result found    OB History    Para Term  AB Living   1             SAB TAB Ectopic Multiple Live Births                  # Outcome Date GA Lbr Leonardo/2nd Weight Sex Delivery Anes PTL Lv   1               Past Medical History:   Diagnosis Date    Anxiety     Back problem     Hypertension     IBS (irritable bowel syndrome)     Migraine headache     Obesity        History reviewed. No pertinent surgical history.    Family History   Problem Relation Age of Onset    Heart attack Paternal Grandfather     Cancer Paternal Grandmother 50        Pancreatic cancer    Emphysema Maternal Grandfather     Breast cancer Neg Hx     Colon cancer Neg Hx     Ovarian cancer Neg Hx        Social History     Socioeconomic History    Marital status: Single     Spouse name: Not on file    Number of children: Not on file    Years of education: Not on file    Highest education level: Not on file   Occupational History    Not on file   Social Needs    Financial resource strain: Not on file    Food insecurity:     Worry: Not on file     Inability: Not on file    Transportation needs:     Medical:  Not on file     Non-medical: Not on file   Tobacco Use    Smoking status: Never Smoker    Smokeless tobacco: Never Used   Substance and Sexual Activity    Alcohol use: No     Comment: socially    Drug use: No    Sexual activity: Yes     Partners: Male     Birth control/protection: Condom   Lifestyle    Physical activity:     Days per week: Not on file     Minutes per session: Not on file    Stress: Not on file   Relationships    Social connections:     Talks on phone: Not on file     Gets together: Not on file     Attends [employer] service: Not on file     Active member of club or organization: Not on file     Attends meetings of clubs or organizations: Not on file     Relationship status: Not on file   Other Topics Concern    Not on file   Social History Narrative    Together since 2010    He drives trucks    She is a        Current Outpatient Medications   Medication Sig Dispense Refill    diazePAM (VALIUM) 5 MG tablet TAKE 1 BY MOUTH NIGHT BEFORE MRI, THEN 1 TABLET 2 HOURS PRIOR TO MRI. MAY REPEAT 1 TABLET IF NEEDED BEFORE MRI. DO NOT DRIVE  0    dicyclomine (BENTYL) 10 MG capsule Take 10 mg by mouth 3 (three) times daily as needed (IBS).      hydroCHLOROthiazide (HYDRODIURIL) 12.5 MG Tab TAKE ONE TABLET BY MOUTH ONCE DAILY FOR FLUID and/or FOR BLOOD PRESSURE.  5    hydroquinone 4 % Crea APPLY TOPICALLY TO DARK SPOTS TWICE DAILY  3    hydrOXYzine pamoate (VISTARIL) 50 MG Cap Take 1 capsule (50 mg total) by mouth every 6 (six) hours as needed (anxiety). 30 capsule 0    ketoconazole (NIZORAL) 2 % shampoo USE SHAMPOO ONCE DAILY  2    LORazepam (ATIVAN) 0.5 MG tablet Take 0.5 mg by mouth 2 (two) times daily.  2    metoprolol tartrate (LOPRESSOR) 50 MG tablet Take 50 mg by mouth once daily.      NIFEdipine (PROCARDIA XL) 30 MG (OSM) 24 hr tablet Procardia XL 30 mg tablet,extended release   Take 1 tablet twice a day by oral route.      promethazine (PHENERGAN) 25 MG tablet Take 25 mg by  mouth every 4 (four) hours as needed for Nausea (IBS).      topiramate (TOPAMAX) 50 MG tablet Take 50 mg by mouth 2 (two) times daily with meals.  0    valACYclovir (VALTREX) 1000 MG tablet Take 2,000 mg by mouth 2 (two) times daily.  0     No current facility-administered medications for this visit.        Review of patient's allergies indicates:   Allergen Reactions    Hydromorphone (bulk) Itching       Review of Systems  Review of Systems   Constitutional: Negative for activity change, appetite change, chills, fatigue, fever and unexpected weight change.   HENT: Negative for mouth sores.    Respiratory: Negative for cough, shortness of breath and wheezing.    Cardiovascular: Negative for chest pain and palpitations.   Gastrointestinal: Negative for abdominal pain, bloating, blood in stool, constipation, nausea and vomiting.   Endocrine: Negative for diabetes and hot flashes.   Genitourinary: Negative for dysmenorrhea, dyspareunia, dysuria, frequency, hematuria, menorrhagia, menstrual problem, pelvic pain, urgency, vaginal bleeding, vaginal discharge, vaginal pain, urinary incontinence, postcoital bleeding and vaginal odor.   Musculoskeletal: Negative for back pain and myalgias.   Integumentary:  Negative for rash, breast mass and nipple discharge.   Neurological: Negative for seizures and headaches.   Psychiatric/Behavioral: Negative for depression and sleep disturbance. The patient is not nervous/anxious.    Breast: Negative for mass, mastodynia and nipple discharge          Objective:    Physical Exam:   Constitutional: She appears well-developed and well-nourished. No distress.   Morbidly obese      HENT:   Head: Normocephalic and atraumatic.    Eyes: EOM are normal.    Neck: Normal range of motion.     Pulmonary/Chest: Effort normal. No respiratory distress.   Breasts: Non-tender, no engorgement, no masses, no retraction, no discharge. Negative for lymphadenopathy.         Abdominal: Soft. She exhibits no  distension. There is no tenderness. There is no rebound and no guarding.     Genitourinary: Vagina normal and uterus normal. No vaginal discharge found.   Genitourinary Comments: Vulva without any obvious lesions.  Vaginal vault with good support.  Minimal white discharge noted.  No obvious lesion.  Cervix is without any cervical motion tenderness.  No obvious lesion.  Uterus is small, non-tender, normal contour.  Adnexa is without any masses or tenderness.    Difficult secondary to body habitus.           Musculoskeletal: Normal range of motion.       Neurological: She is alert.    Skin: Skin is warm and dry.   Acanthosis nigricans    Psychiatric: She has a normal mood and affect.          Assessment:        1. Well woman exam with routine gynecological exam    2. PCOS (polycystic ovarian syndrome)    3.  Preconception counseling         Plan:          I have discussed with the patient her condition.  Monthly breast examination was instructed, discussed, and encouraged.  Patient was encouraged to consume a low-calorie, low fat diet, and to increase of physical activity.  Healthy habits encouraged.  Weight loss encouraged.  A Pap smear was performed according to the USPSTF recommendations.  Mammogram was not ordered because of the combination of her age and risk factors, according to ACOG guidelines.  Gonorrhea and Chlamydia testing not performed;  HIV test not ordered, again according to guidelines.  Colonoscopy discussed according to ACS guideline.  We also discussed her obstetric history and CV risks.   Patient is to continue her medications as prescribed.  She will come back to see me in one year for her annual visit.  She can come back to see me sooner as necessary.  All of her questions were answered appropriately to her satisfaction.     We discussed management plan for PCOS.  Weight loss is one of the most important tenets in the management of PCOS.  We discouraged her from any specific diet.  Low calorie  appeared to be the only way to control weight gain and to lose weight.  This should be in combination with a slow increase in physical activities.    I told her that weight loss as little as 5-10% of her current weight could possibly get her pregnant.    At her current weight, pregnancy would be high risk for PIH/preeclampsia, diabetes,  labor...  She would like ovulation induction now.  I told her that the medication would work better with her losing some weight.  This would also provide her an incentive to work harder to lose weight.  Weight loss would not be easy for her.    Back in 3 months.  Goal is to lose about 10-20 lbs.  She said she would lose 30 lbs.    We would start her on clomiphene/letrozole on return visit if she loses weight.

## 2019-06-07 NOTE — TELEPHONE ENCOUNTER
----- Message from Seb Rivera sent at 6/7/2019  4:29 PM CDT -----  Contact: pt   .Type: RX Refill Request    Who Called: pt     Refill or New Refill    RX Name and Strength: metFORMIN (GLUCOPHAGE) 500 MG tablet     Preferred Pharmacy with phone number:.  [facility]   [id number]   [place] [address]  [phone]    Local or Mail Order:local     Would the patient rather a call back or a response via [facility]? Call back     Best Call Back Number:[phone]    Additional Information:

## 2019-06-12 LAB
HPV HR 12 DNA CVX QL NAA+PROBE: NEGATIVE
HPV16 AG SPEC QL: NEGATIVE
HPV18 DNA SPEC QL NAA+PROBE: NEGATIVE

## 2019-07-30 ENCOUNTER — HOSPITAL ENCOUNTER (EMERGENCY)
Facility: HOSPITAL | Age: 29
Discharge: HOME | End: 2019-07-30
Attending: EMERGENCY MEDICINE

## 2019-07-30 VITALS
WEIGHT: 293 LBS | BODY MASS INDEX: 39.68 KG/M2 | OXYGEN SATURATION: 98 % | HEIGHT: 72 IN | TEMPERATURE: 99 F | RESPIRATION RATE: 16 BRPM | HEART RATE: 81 BPM | DIASTOLIC BLOOD PRESSURE: 75 MMHG | SYSTOLIC BLOOD PRESSURE: 131 MMHG

## 2019-07-30 DIAGNOSIS — R10.31 RIGHT LOWER QUADRANT ABDOMINAL PAIN: Primary | ICD-10-CM

## 2019-07-30 DIAGNOSIS — B96.89 BV (BACTERIAL VAGINOSIS): ICD-10-CM

## 2019-07-30 DIAGNOSIS — N76.0 BV (BACTERIAL VAGINOSIS): ICD-10-CM

## 2019-07-30 LAB
ALBUMIN SERPL BCP-MCNC: 3.9 G/DL (ref 3.5–5.2)
ALP SERPL-CCNC: 68 U/L (ref 55–135)
ALT SERPL W/O P-5'-P-CCNC: 14 U/L (ref 10–44)
ANION GAP SERPL CALC-SCNC: 9 MMOL/L (ref 8–16)
AST SERPL-CCNC: 12 U/L (ref 10–40)
B-HCG UR QL: NEGATIVE
BACTERIA #/AREA URNS HPF: ABNORMAL /HPF
BACTERIA GENITAL QL WET PREP: ABNORMAL
BASOPHILS # BLD AUTO: 0.02 K/UL (ref 0–0.2)
BASOPHILS NFR BLD: 0.3 % (ref 0–1.9)
BILIRUB SERPL-MCNC: 0.3 MG/DL (ref 0.1–1)
BILIRUB UR QL STRIP: NEGATIVE
BUN SERPL-MCNC: 14 MG/DL (ref 6–20)
CALCIUM SERPL-MCNC: 9.8 MG/DL (ref 8.7–10.5)
CHLORIDE SERPL-SCNC: 104 MMOL/L (ref 95–110)
CLARITY UR: CLEAR
CLUE CELLS VAG QL WET PREP: ABNORMAL
CO2 SERPL-SCNC: 29 MMOL/L (ref 23–29)
COLOR UR: YELLOW
CREAT SERPL-MCNC: 1 MG/DL (ref 0.5–1.4)
CTP QC/QA: YES
DIFFERENTIAL METHOD: ABNORMAL
EOSINOPHIL # BLD AUTO: 0.1 K/UL (ref 0–0.5)
EOSINOPHIL NFR BLD: 1.4 % (ref 0–8)
ERYTHROCYTE [DISTWIDTH] IN BLOOD BY AUTOMATED COUNT: 13.3 % (ref 11.5–14.5)
EST. GFR  (AFRICAN AMERICAN): >60 ML/MIN/1.73 M^2
EST. GFR  (NON AFRICAN AMERICAN): >60 ML/MIN/1.73 M^2
FILAMENT FUNGI VAG WET PREP-#/AREA: ABNORMAL
GLUCOSE SERPL-MCNC: 98 MG/DL (ref 70–110)
GLUCOSE UR QL STRIP: NEGATIVE
HCT VFR BLD AUTO: 41.3 % (ref 37–48.5)
HGB BLD-MCNC: 13.3 G/DL (ref 12–16)
HGB UR QL STRIP: ABNORMAL
KETONES UR QL STRIP: NEGATIVE
LEUKOCYTE ESTERASE UR QL STRIP: NEGATIVE
LYMPHOCYTES # BLD AUTO: 2.8 K/UL (ref 1–4.8)
LYMPHOCYTES NFR BLD: 36.5 % (ref 18–48)
MCH RBC QN AUTO: 27.7 PG (ref 27–31)
MCHC RBC AUTO-ENTMCNC: 32.2 G/DL (ref 32–36)
MCV RBC AUTO: 86 FL (ref 82–98)
MICROSCOPIC COMMENT: ABNORMAL
MONOCYTES # BLD AUTO: 0.6 K/UL (ref 0.3–1)
MONOCYTES NFR BLD: 7.9 % (ref 4–15)
NEUTROPHILS # BLD AUTO: 4.2 K/UL (ref 1.8–7.7)
NEUTROPHILS NFR BLD: 54 % (ref 38–73)
NITRITE UR QL STRIP: NEGATIVE
PH UR STRIP: 7 [PH] (ref 5–8)
PLATELET # BLD AUTO: 364 K/UL (ref 150–350)
PMV BLD AUTO: 9.4 FL (ref 9.2–12.9)
POTASSIUM SERPL-SCNC: 4.4 MMOL/L (ref 3.5–5.1)
PROT SERPL-MCNC: 7.7 G/DL (ref 6–8.4)
PROT UR QL STRIP: NEGATIVE
RBC # BLD AUTO: 4.81 M/UL (ref 4–5.4)
RBC #/AREA URNS HPF: 10 /HPF (ref 0–4)
SODIUM SERPL-SCNC: 142 MMOL/L (ref 136–145)
SP GR UR STRIP: 1.02 (ref 1–1.03)
SPECIMEN SOURCE: ABNORMAL
SQUAMOUS #/AREA URNS HPF: 10 /HPF
T VAGINALIS GENITAL QL WET PREP: ABNORMAL
URN SPEC COLLECT METH UR: ABNORMAL
UROBILINOGEN UR STRIP-ACNC: NEGATIVE EU/DL
WBC # BLD AUTO: 7.72 K/UL (ref 3.9–12.7)
WBC #/AREA URNS HPF: 1 /HPF (ref 0–5)
WBC #/AREA VAG WET PREP: ABNORMAL
YEAST GENITAL QL WET PREP: ABNORMAL

## 2019-07-30 PROCEDURE — 25500020 PHARM REV CODE 255: Performed by: EMERGENCY MEDICINE

## 2019-07-30 PROCEDURE — 87210 SMEAR WET MOUNT SALINE/INK: CPT

## 2019-07-30 PROCEDURE — 87491 CHLMYD TRACH DNA AMP PROBE: CPT

## 2019-07-30 PROCEDURE — 96374 THER/PROPH/DIAG INJ IV PUSH: CPT

## 2019-07-30 PROCEDURE — 81025 URINE PREGNANCY TEST: CPT | Performed by: PHYSICIAN ASSISTANT

## 2019-07-30 PROCEDURE — 63600175 PHARM REV CODE 636 W HCPCS: Performed by: NURSE PRACTITIONER

## 2019-07-30 PROCEDURE — 25000003 PHARM REV CODE 250: Performed by: NURSE PRACTITIONER

## 2019-07-30 PROCEDURE — 81000 URINALYSIS NONAUTO W/SCOPE: CPT

## 2019-07-30 PROCEDURE — 85025 COMPLETE CBC W/AUTO DIFF WBC: CPT

## 2019-07-30 PROCEDURE — 86140 C-REACTIVE PROTEIN: CPT

## 2019-07-30 PROCEDURE — 80053 COMPREHEN METABOLIC PANEL: CPT

## 2019-07-30 PROCEDURE — 96361 HYDRATE IV INFUSION ADD-ON: CPT

## 2019-07-30 PROCEDURE — 99285 EMERGENCY DEPT VISIT HI MDM: CPT | Mod: 25

## 2019-07-30 RX ORDER — KETOROLAC TROMETHAMINE 30 MG/ML
10 INJECTION, SOLUTION INTRAMUSCULAR; INTRAVENOUS
Status: COMPLETED | OUTPATIENT
Start: 2019-07-30 | End: 2019-07-30

## 2019-07-30 RX ORDER — MORPHINE SULFATE 10 MG/ML
3 INJECTION INTRAMUSCULAR; INTRAVENOUS; SUBCUTANEOUS
Status: DISCONTINUED | OUTPATIENT
Start: 2019-07-30 | End: 2019-07-31 | Stop reason: HOSPADM

## 2019-07-30 RX ORDER — HYDROCODONE BITARTRATE AND ACETAMINOPHEN 5; 325 MG/1; MG/1
1 TABLET ORAL EVERY 6 HOURS PRN
Qty: 12 TABLET | Refills: 0 | Status: SHIPPED | OUTPATIENT
Start: 2019-07-30 | End: 2019-09-21

## 2019-07-30 RX ORDER — METRONIDAZOLE 500 MG/1
500 TABLET ORAL EVERY 12 HOURS
Qty: 14 TABLET | Refills: 0 | Status: SHIPPED | OUTPATIENT
Start: 2019-07-30 | End: 2019-08-06

## 2019-07-30 RX ORDER — METRONIDAZOLE 500 MG/1
500 TABLET ORAL
Status: COMPLETED | OUTPATIENT
Start: 2019-07-30 | End: 2019-07-30

## 2019-07-30 RX ADMIN — KETOROLAC TROMETHAMINE 10 MG: 30 INJECTION, SOLUTION INTRAMUSCULAR; INTRAVENOUS at 06:07

## 2019-07-30 RX ADMIN — SODIUM CHLORIDE 1000 ML: 0.9 INJECTION, SOLUTION INTRAVENOUS at 06:07

## 2019-07-30 RX ADMIN — IOHEXOL 100 ML: 350 INJECTION, SOLUTION INTRAVENOUS at 06:07

## 2019-07-30 RX ADMIN — METRONIDAZOLE 500 MG: 500 TABLET ORAL at 11:07

## 2019-07-31 ENCOUNTER — HOSPITAL ENCOUNTER (EMERGENCY)
Facility: HOSPITAL | Age: 29
Discharge: HOME | End: 2019-07-31
Attending: EMERGENCY MEDICINE

## 2019-07-31 VITALS
WEIGHT: 293 LBS | TEMPERATURE: 98 F | HEIGHT: 72 IN | HEART RATE: 71 BPM | DIASTOLIC BLOOD PRESSURE: 82 MMHG | OXYGEN SATURATION: 96 % | SYSTOLIC BLOOD PRESSURE: 122 MMHG | RESPIRATION RATE: 18 BRPM | BODY MASS INDEX: 39.68 KG/M2

## 2019-07-31 DIAGNOSIS — Z87.19 HISTORY OF IBS: ICD-10-CM

## 2019-07-31 DIAGNOSIS — R11.0 NAUSEA: ICD-10-CM

## 2019-07-31 DIAGNOSIS — K52.9 ENTERITIS: Primary | ICD-10-CM

## 2019-07-31 DIAGNOSIS — R10.31 RLQ ABDOMINAL PAIN: ICD-10-CM

## 2019-07-31 LAB
ALBUMIN SERPL BCP-MCNC: 3.8 G/DL (ref 3.5–5.2)
ALP SERPL-CCNC: 64 U/L (ref 55–135)
ALT SERPL W/O P-5'-P-CCNC: 13 U/L (ref 10–44)
ANION GAP SERPL CALC-SCNC: 10 MMOL/L (ref 8–16)
AST SERPL-CCNC: 13 U/L (ref 10–40)
B-HCG UR QL: NEGATIVE
BACTERIA #/AREA URNS HPF: ABNORMAL /HPF
BASOPHILS # BLD AUTO: 0.03 K/UL (ref 0–0.2)
BASOPHILS NFR BLD: 0.5 % (ref 0–1.9)
BILIRUB SERPL-MCNC: 0.3 MG/DL (ref 0.1–1)
BILIRUB UR QL STRIP: NEGATIVE
BUN SERPL-MCNC: 15 MG/DL (ref 6–20)
C TRACH DNA SPEC QL NAA+PROBE: NOT DETECTED
CALCIUM SERPL-MCNC: 9.2 MG/DL (ref 8.7–10.5)
CHLORIDE SERPL-SCNC: 105 MMOL/L (ref 95–110)
CLARITY UR: CLEAR
CO2 SERPL-SCNC: 26 MMOL/L (ref 23–29)
COLOR UR: YELLOW
CREAT SERPL-MCNC: 0.8 MG/DL (ref 0.5–1.4)
CRP SERPL-MCNC: 15.6 MG/L (ref 0–8.2)
CRP SERPL-MCNC: 16.6 MG/L (ref 0–8.2)
CTP QC/QA: YES
DIFFERENTIAL METHOD: NORMAL
EOSINOPHIL # BLD AUTO: 0.1 K/UL (ref 0–0.5)
EOSINOPHIL NFR BLD: 1.6 % (ref 0–8)
ERYTHROCYTE [DISTWIDTH] IN BLOOD BY AUTOMATED COUNT: 13.4 % (ref 11.5–14.5)
EST. GFR  (AFRICAN AMERICAN): >60 ML/MIN/1.73 M^2
EST. GFR  (NON AFRICAN AMERICAN): >60 ML/MIN/1.73 M^2
GLUCOSE SERPL-MCNC: 101 MG/DL (ref 70–110)
GLUCOSE UR QL STRIP: NEGATIVE
HCT VFR BLD AUTO: 40 % (ref 37–48.5)
HGB BLD-MCNC: 12.8 G/DL (ref 12–16)
HGB UR QL STRIP: ABNORMAL
KETONES UR QL STRIP: NEGATIVE
LEUKOCYTE ESTERASE UR QL STRIP: NEGATIVE
LIPASE SERPL-CCNC: 23 U/L (ref 4–60)
LYMPHOCYTES # BLD AUTO: 2 K/UL (ref 1–4.8)
LYMPHOCYTES NFR BLD: 31.8 % (ref 18–48)
MCH RBC QN AUTO: 27.2 PG (ref 27–31)
MCHC RBC AUTO-ENTMCNC: 32 G/DL (ref 32–36)
MCV RBC AUTO: 85 FL (ref 82–98)
MICROSCOPIC COMMENT: ABNORMAL
MONOCYTES # BLD AUTO: 0.6 K/UL (ref 0.3–1)
MONOCYTES NFR BLD: 10.3 % (ref 4–15)
N GONORRHOEA DNA SPEC QL NAA+PROBE: NOT DETECTED
NEUTROPHILS # BLD AUTO: 3.4 K/UL (ref 1.8–7.7)
NEUTROPHILS NFR BLD: 55.8 % (ref 38–73)
NITRITE UR QL STRIP: NEGATIVE
PH UR STRIP: 6 [PH] (ref 5–8)
PLATELET # BLD AUTO: 329 K/UL (ref 150–350)
PMV BLD AUTO: 9.4 FL (ref 9.2–12.9)
POTASSIUM SERPL-SCNC: 4.2 MMOL/L (ref 3.5–5.1)
PROT SERPL-MCNC: 7.3 G/DL (ref 6–8.4)
PROT UR QL STRIP: NEGATIVE
RBC # BLD AUTO: 4.71 M/UL (ref 4–5.4)
RBC #/AREA URNS HPF: 8 /HPF (ref 0–4)
SODIUM SERPL-SCNC: 141 MMOL/L (ref 136–145)
SP GR UR STRIP: 1.01 (ref 1–1.03)
SQUAMOUS #/AREA URNS HPF: 4 /HPF
URN SPEC COLLECT METH UR: ABNORMAL
UROBILINOGEN UR STRIP-ACNC: NEGATIVE EU/DL
WBC # BLD AUTO: 6.2 K/UL (ref 3.9–12.7)

## 2019-07-31 PROCEDURE — 63600175 PHARM REV CODE 636 W HCPCS: Performed by: PHYSICIAN ASSISTANT

## 2019-07-31 PROCEDURE — 81025 URINE PREGNANCY TEST: CPT | Performed by: PHYSICIAN ASSISTANT

## 2019-07-31 PROCEDURE — 96361 HYDRATE IV INFUSION ADD-ON: CPT

## 2019-07-31 PROCEDURE — 83690 ASSAY OF LIPASE: CPT

## 2019-07-31 PROCEDURE — 85025 COMPLETE CBC W/AUTO DIFF WBC: CPT

## 2019-07-31 PROCEDURE — 80053 COMPREHEN METABOLIC PANEL: CPT

## 2019-07-31 PROCEDURE — 96360 HYDRATION IV INFUSION INIT: CPT

## 2019-07-31 PROCEDURE — 81000 URINALYSIS NONAUTO W/SCOPE: CPT

## 2019-07-31 PROCEDURE — 99285 EMERGENCY DEPT VISIT HI MDM: CPT | Mod: 25

## 2019-07-31 PROCEDURE — 25000003 PHARM REV CODE 250: Performed by: PHYSICIAN ASSISTANT

## 2019-07-31 PROCEDURE — 86140 C-REACTIVE PROTEIN: CPT

## 2019-07-31 RX ORDER — METRONIDAZOLE 500 MG/1
500 TABLET ORAL
Status: COMPLETED | OUTPATIENT
Start: 2019-07-31 | End: 2019-07-31

## 2019-07-31 RX ORDER — DICYCLOMINE HYDROCHLORIDE 20 MG/1
20 TABLET ORAL 4 TIMES DAILY
Qty: 12 TABLET | Refills: 0 | Status: SHIPPED | OUTPATIENT
Start: 2019-07-31 | End: 2019-08-03

## 2019-07-31 RX ORDER — CIPROFLOXACIN 500 MG/1
500 TABLET ORAL
Status: COMPLETED | OUTPATIENT
Start: 2019-07-31 | End: 2019-07-31

## 2019-07-31 RX ORDER — CIPROFLOXACIN 500 MG/1
500 TABLET ORAL 2 TIMES DAILY
Qty: 20 TABLET | Refills: 0 | Status: SHIPPED | OUTPATIENT
Start: 2019-07-31 | End: 2019-08-10

## 2019-07-31 RX ORDER — KETOROLAC TROMETHAMINE 30 MG/ML
15 INJECTION, SOLUTION INTRAMUSCULAR; INTRAVENOUS
Status: DISCONTINUED | OUTPATIENT
Start: 2019-07-31 | End: 2019-07-31 | Stop reason: HOSPADM

## 2019-07-31 RX ORDER — ONDANSETRON 2 MG/ML
8 INJECTION INTRAMUSCULAR; INTRAVENOUS
Status: DISCONTINUED | OUTPATIENT
Start: 2019-07-31 | End: 2019-07-31 | Stop reason: HOSPADM

## 2019-07-31 RX ORDER — ONDANSETRON 4 MG/1
8 TABLET, FILM COATED ORAL EVERY 12 HOURS PRN
Qty: 12 TABLET | Refills: 0 | Status: SHIPPED | OUTPATIENT
Start: 2019-07-31 | End: 2019-09-21

## 2019-07-31 RX ADMIN — CIPROFLOXACIN HYDROCHLORIDE 500 MG: 500 TABLET, FILM COATED ORAL at 10:07

## 2019-07-31 RX ADMIN — SODIUM CHLORIDE 1000 ML: 0.9 INJECTION, SOLUTION INTRAVENOUS at 08:07

## 2019-07-31 RX ADMIN — METRONIDAZOLE 500 MG: 500 TABLET ORAL at 10:07

## 2019-07-31 NOTE — ED PROVIDER NOTES
Encounter Date: 7/30/2019       History     Chief Complaint   Patient presents with    Abdominal Pain     Pt reports abdominal pain localized to RLQ. Pt reports still havign her appendix. denies flank pain. Pt denies taking any medication for symptoms. Denies fever, chills, nausea or vomiting.      Chief complaint:  Abdominal pain    History of present illness:  Patient is a 28-year-old female who reports that last night she began experience right lower quadrant abdominal pain that is worsening.  Current severity pain is 6/10.  She reports the character pain is aching with tenderness.  Hitting bumps in the vehicle made her pain worse.  She reports no alleviating factors.  She endorses diarrhea but denies nausea, vomiting, changes in appetite, urinary changes, vaginal bleeding or discharge.    The history is provided by the patient, the spouse and medical records. No  was used.     Review of patient's allergies indicates:   Allergen Reactions    Hydromorphone (bulk) Itching     Past Medical History:   Diagnosis Date    Anxiety     Back problem     Hypertension     IBS (irritable bowel syndrome)     Migraine headache     Obesity      History reviewed. No pertinent surgical history.  Family History   Problem Relation Age of Onset    Heart attack Paternal Grandfather     Cancer Paternal Grandmother 50        Pancreatic cancer    Emphysema Maternal Grandfather     Breast cancer Neg Hx     Colon cancer Neg Hx     Ovarian cancer Neg Hx      Social History     Tobacco Use    Smoking status: Never Smoker    Smokeless tobacco: Never Used   Substance Use Topics    Alcohol use: No     Comment: socially    Drug use: No     Review of Systems   Constitutional: Negative for chills, fatigue and fever.   HENT: Negative for congestion, ear discharge, ear pain, postnasal drip, rhinorrhea, sinus pressure, sneezing, sore throat and voice change.    Eyes: Negative for discharge and itching.    Respiratory: Negative for cough, shortness of breath and wheezing.    Cardiovascular: Negative for chest pain, palpitations and leg swelling.   Gastrointestinal: Positive for abdominal pain and diarrhea. Negative for constipation, nausea and vomiting.   Endocrine: Negative for polydipsia, polyphagia and polyuria.   Genitourinary: Negative for dysuria, frequency, hematuria, urgency, vaginal bleeding, vaginal discharge and vaginal pain.   Musculoskeletal: Negative for arthralgias and myalgias.   Skin: Negative for rash and wound.   Neurological: Negative for dizziness, seizures, syncope, weakness and numbness.   Hematological: Negative for adenopathy. Does not bruise/bleed easily.   Psychiatric/Behavioral: Negative for self-injury and suicidal ideas. The patient is not nervous/anxious.        Physical Exam     Initial Vitals [07/30/19 1703]   BP Pulse Resp Temp SpO2   (!) 172/79 65 20 98.9 °F (37.2 °C) 97 %      MAP       --         Physical Exam    Nursing note and vitals reviewed.  Constitutional: She appears well-developed and well-nourished.   HENT:   Head: Normocephalic and atraumatic.   Right Ear: External ear normal.   Left Ear: External ear normal.   Nose: Nose normal.   Eyes: Conjunctivae and EOM are normal. Pupils are equal, round, and reactive to light. Right eye exhibits no discharge. Left eye exhibits no discharge.   Neck: Normal range of motion.   Cardiovascular: Regular rhythm, S1 normal, S2 normal and normal heart sounds. Exam reveals no gallop.    No murmur heard.  Pulmonary/Chest: Effort normal and breath sounds normal. No respiratory distress. She has no decreased breath sounds. She has no wheezes. She has no rhonchi. She has no rales.   Abdominal: Soft. Normal appearance and bowel sounds are normal. She exhibits no distension. There is tenderness in the right lower quadrant. Hernia confirmed negative in the right inguinal area and confirmed negative in the left inguinal area.   Genitourinary:  Uterus normal. Pelvic exam was performed with patient prone. No labial fusion. There is no rash, tenderness, lesion or injury on the right labia. There is no rash, tenderness, lesion or injury on the left labia. Uterus is not deviated, not enlarged, not fixed and not tender. Cervix exhibits no motion tenderness, no discharge and no friability. Right adnexum displays no mass, no tenderness and no fullness. Left adnexum displays no mass, no tenderness and no fullness. No erythema, tenderness or bleeding in the vagina. No foreign body in the vagina. No signs of injury around the vagina. Vaginal discharge found.   Musculoskeletal: Normal range of motion.   Lymphadenopathy:        Right: No inguinal adenopathy present.        Left: No inguinal adenopathy present.   Neurological: She is alert and oriented to person, place, and time.   Skin: Skin is dry. Capillary refill takes less than 2 seconds.         ED Course   Procedures  Labs Reviewed   CBC W/ AUTO DIFFERENTIAL - Abnormal; Notable for the following components:       Result Value    Platelets 364 (*)     All other components within normal limits   URINALYSIS, REFLEX TO URINE CULTURE - Abnormal; Notable for the following components:    Occult Blood UA 1+ (*)     All other components within normal limits    Narrative:     Preferred Collection Type->Urine, Clean Catch   VAGINAL SCREEN - Abnormal; Notable for the following components:    Clue Cells Few (*)     Bacteria - Vaginal Screen Moderate (*)     All other components within normal limits   URINALYSIS MICROSCOPIC - Abnormal; Notable for the following components:    RBC, UA 10 (*)     Bacteria Few (*)     All other components within normal limits    Narrative:     Preferred Collection Type->Urine, Clean Catch   C. TRACHOMATIS/N. GONORRHOEAE BY AMP DNA   COMPREHENSIVE METABOLIC PANEL   C-REACTIVE PROTEIN   POCT URINE PREGNANCY          Imaging Results          US Pelvis Complete Non OB (Final result)  Result time  07/30/19 23:21:23    Final result by Darrin Parra MD (07/30/19 23:21:23)                 Impression:      Normal bilateral ovarian arterial and venous flow.      Electronically signed by: Darrin Parra  Date:    07/30/2019  Time:    23:21             Narrative:    EXAMINATION:  US PELVIS COMPLETE NON OB    CLINICAL HISTORY:  repeat with attn to right ovarian flow, discussed with Dr. Adams and Dr. Vaughan;    TECHNIQUE:  Transvaginal pelvic ultrasound performed.  Color Doppler and spectral Doppler flow interrogation performed.    COMPARISON:  Ultrasound at 19:46 today and abdominopelvic CT scan at 18:19 today.    FINDINGS:  Transvaginal pelvic ultrasound: The uterus measures 8.2 x 4.9 by 6.4 cm and shows overall normal morphology.  The intramural fibroid demonstrated early this evening is not definitely visualized on current exam perhaps due to technical factors.    The right ovary measures 2.9 by 2.9 x 1.2 cm and appears normal.  The right ovary shows normal color Doppler flow with normal arterial and venous spectral waveforms.    The left ovary shows normal color Doppler flow with normal arterial and venous spectral waveforms.  Left ovary measures 4.1 x 2.5 x 3.6 cm and has a 2 x 1 x 5 x 1.7 cm simple cyst.                                US Pelvis Comp with Transvag NON-OB (xpd) (Final result)  Result time 07/30/19 20:30:56   Procedure changed from US Pelvis Complete Non OB     Final result by Minh Rachel MD (07/30/19 20:30:56)                 Impression:      Arterial and venous flow was not well demonstrated at the right ovary which could be related to technical factors; however, right-sided ovarian torsion cannot be excluded on the basis of this examination.  Further evaluation/follow-up as warranted.    Solitary 1.5 cm intramural fibroid.    This report was flagged in Epic as abnormal.      Electronically signed by: Minh Rachel MD  Date:    07/30/2019  Time:    20:30             Narrative:     EXAMINATION:  US PELVIS COMP WITH TRANSVAG NON-OB (XPD)    CLINICAL HISTORY:  right lower quad abd pain;    TECHNIQUE:  Transabdominal sonography of the pelvis was performed, followed by transvaginal sonography to better evaluate the uterus and ovaries.    COMPARISON:  CT abdomen and pelvis earlier same day    FINDINGS:  Uterus:    Measures 8.6 x 4.5 x 3.9 cm in dimensions.  There is a solitary 1.3 x 1.5 x 1.5 cm intramural fibroid.  The endometrium is normal thickness measuring 2 mm.  Small nabothian cysts noted.    Ovaries:    The ovaries are normal in size.  The right ovary measures 1.7 x 2.5 x 1.8 cm.  The left ovary measures 2.3 x 3 x 2.1 cm, containing a 1.6 cm simple appearing dominant follicle.   Appropriate arterial and venous flow demonstrated at the left ovary.  Arterial and venous flow was not well demonstrated at the right ovary.    Free Fluid:    None.                               CT Abdomen Pelvis With Contrast (Final result)  Result time 07/30/19 18:49:19    Final result by Minh Rachel MD (07/30/19 18:49:19)                 Impression:      1. No acute process or CT findings identified to explain patient's symptoms of right lower quadrant pain on this noncontrast CT.  Specifically, no evidence of appendicitis.  2. Splenomegaly.  3. Left adnexal 1.7 cm hypodense area that may represent a complex/hemorrhagic dominant follicle.  Further evaluation with elective/nonemergent pelvic ultrasound can be obtained as warranted.  4. Few additional findings as above.      Electronically signed by: Minh Rachel MD  Date:    07/30/2019  Time:    18:49             Narrative:    EXAMINATION:  CT ABDOMEN PELVIS WITH CONTRAST    CLINICAL HISTORY:  RLQ pain, appendicitis suspected;    TECHNIQUE:  Low dose axial images, sagittal and coronal reformations were obtained from the lung bases to the pubic symphysis following the IV administration of 100 mL of Omnipaque 350 .  Oral contrast was not  given.    COMPARISON:  CT thorax 07/27/2017 and CT renal stone study 03/29/2014; abdominal ultrasound 12/04/2013    FINDINGS:  Imaged lung bases are clear.  Base of the heart is within normal limits.    Spleen is enlarged without focal process seen.  Liver, gallbladder, pancreas, stomach, duodenum and bilateral adrenal glands are within normal limits.  No biliary ductal dilatation.  Suspected 3 small accessory splenules at the splenic hilum.    Bilateral kidneys are normal in size, shape and location with relatively symmetric enhancement.  Two punctate nephroliths at the left mid and lower poles.  No hydronephrosis or significant perinephric stranding.  Ureters are nondilated.  Urinary bladder is suboptimally distended.  Uterus is somewhat anteverted and tilted towards the right.  1.7 cm somewhat rounded hypodense area at the left adnexal region with average 34 Hounsfield units not consistent with a simple cyst.  No large amount of free fluid seen within the pelvis.    No ascites, free air or lymphadenopathy by CT criteria.  No significant atherosclerosis.  No aortic aneurysm or dissection.    Tiny fat containing umbilical hernia.  Appendix and terminal ileum are within normal limits.  No evidence of bowel obstruction or inflammation.  No pneumatosis or portal venous gas.    Osseous structures are intact.                                 Medical Decision Making:   Initial Assessment:   28-year-old female with right lower quadrant abdominal pain  Differential Diagnosis:   Appendicitis, ovarian torsion, peritonitis, mesenteric adenitis  ED Management:  Initial orders include CBC, chemistry, urinalysis, UPT, vaginal screen, GC chlamydia swabs, setup pelvic tray, NPO, IV start, CT abdomen pelvis with IV contrast, ultrasound pelvic non OB, Toradol 10 mg IV push, normal saline 1 L IV.                   ED Course as of Jul 30 2335   Tue Jul 30, 2019   1745 CBC: leukocyte count was normal, the H&H was normal. The platelet  count was increased.       CBC auto differential(!) [VC]   1802 The chemistry was negative for hypo-or hyper natremia, kalemia, chloridemia, or other electrolyte abnormalities; BUN and creatinine were within normal limits indicating normal kidney function, ALT and AST were within normal limits indicating normal liver function, there was no transaminitis.       Comprehensive metabolic panel [VC]   1804 Preg Test, Ur: Negative [VC]   1833 UA is negative for infection, no nitrites, leukocytes, or protein present. Pos for hematuria.  Pt has uterus and finished menstruating last week.     Urinalysis, Reflex to Urine Culture Urine, Clean Catch(!) [VC]   1833 Pending at time of disposition.   C. trachomatis/N. gonorrhoeae by AMP DNA [VC]   1835 Gyn exam performed with Radha CAMILO as chaperone assistant.    [VC]   1852 1. No acute process or CT findings identified to explain patient's symptoms of right lower quadrant pain on this noncontrast CT.  Specifically, no evidence of appendicitis.  2. Splenomegaly.  3. Left adnexal 1.7 cm hypodense area that may represent a complex/hemorrhagic dominant follicle.  Further evaluation with elective/nonemergent pelvic ultrasound can be obtained as warranted.  4. Few additional findings as above.   CT Abdomen Pelvis With Contrast [VC]   1924 Pos for bv, will treat with flagyl   Clue Cells, Wet Prep(!): Few [VC]   1933 Awaiting result.   US Pelvis Complete Non OB [VC]   2036 Arterial and venous flow was not well demonstrated at the right ovary which could be related to technical factors; however, right-sided ovarian torsion cannot be excluded on the basis of this examination.  Further evaluation/follow-up as warranted.    Solitary 1.5 cm intramural fibroid.   US Pelvis Comp with Transvag NON-OB (xpd)(!) [VC]   2040 US Pelvis Comp with Transvag NON-OB (xpd)(!) [MH]   2042 Few clue cells on vaginal screen   Clue Cells, Wet Prep(!): Few [MH]   2042 CMP is normal   Comprehensive  metabolic panel []   2042 CBC is normal   CBC auto differential(!) [MH]   2042 Trace blood on UA   Occult Blood UA(!): 1+ [MH]   2043 CT:1.7 cm somewhat rounded hypodense area at the left adnexal region with average 34 Hounsfield units not consistent with a simple cyst    [MH]   2044 DR. Vaughan given SBAR report.  She would like me to discuss pt with OBGYN.     [VC]   2045 Us: Arterial and venous flow was not well demonstrated at the right ovary.      [MH]   2208 Pt requested pain meds.  MS 3mg was ordered and not given previously, nurse notified.    [VC]   2232 Awaiting repeat ultrasound.  Nayla in US stated it will be about another 20 min.    [VC]   2324 SBAR given to Dr. Robb, he is in room with patient now.    [VC]   2324 Normal bilateral ovarian arterial and venous flow.   US Pelvis Complete Non OB [VC]      ED Course User Index  [] Timothy Vaughan MD  [VC] Moshe Floyd DNP     Clinical Impression:       ICD-10-CM ICD-9-CM   1. Right lower quadrant abdominal pain R10.31 789.03   2. BV (bacterial vaginosis) N76.0 616.10    B96.89 041.9     Patient is discharged home in good condition to follow up tomorrow morning in the emergency department for CBC and CRP.  I prescribed Norco 5/325 q.6 hours p.r.n. pain, Flagyl 500 mg p.o. b.i.d.; drowsy warning provided.Symptomatic therapies and return precautions on AVS.   Medication choices were made after reviewing allergies, medications, history, available laboratories.     Disposition:   Disposition: Discharged  Condition: Stable                        Moshe Floyd DNP  07/30/19 [id number]

## 2019-07-31 NOTE — ED PROVIDER NOTES
"Encounter Date: 7/31/2019    SCRIBE #1 NOTE: I, Laura Miranda, am scribing for, and in the presence of,  Dave Leary PA-C. I have scribed the following portions of the note - Other sections scribed: HPI and ROS.       History     Chief Complaint   Patient presents with    Follow Up     Pt reports she was here yesterday and diagnosed with appendicitis. she states she was told to follow up with "fast track" this AM for possible surgery. At this time she reports RLQ pain (6/10)    Abdominal Pain     CC: Follow Up    HPI: This 28 y.o female, with a medical history of anxiety, hypertension, irritable bowel syndrome, migraine headache, and obesity, presents to the ED for a follow up visit. Pt reports that she was seen at this ED last night for right lower abdominal pain, which began 2x days ago, and subsequently had an ultrasound preformed which showed possible intermittent ovarian torsion. She states that she was later found to have blood flow in the right ovary via a second ultrasound and adds that no abnormalities were found with the appendix. Pt notes that she was instructed to return to the fast track this morning so that she can be further evaluated by a surgeon. She presently reports that the non-radiating right lower abdominal pain is persisting. She adds that she is also experiencing associated nausea as well as chronic diarrhea (due to IBS). The symptoms are acute, constant and moderate (6/10). Pt notes that the present symptoms are not similar to her typical IBS flares. Pt denies emesis, pelvic pain, back pain, fever, dysuria, chest pain and shortness of breath. No other associated symptoms. No treatment attempted PTA to the ED. No alleviating factors.    The history is provided by the patient. No  was used.     Review of patient's allergies indicates:   Allergen Reactions    Hydromorphone (bulk) Itching     Past Medical History:   Diagnosis Date    Anxiety     Back problem     " Hypertension     IBS (irritable bowel syndrome)     Migraine headache     Obesity      History reviewed. No pertinent surgical history.  Family History   Problem Relation Age of Onset    Heart attack Paternal Grandfather     Cancer Paternal Grandmother 50        Pancreatic cancer    Emphysema Maternal Grandfather     Breast cancer Neg Hx     Colon cancer Neg Hx     Ovarian cancer Neg Hx      Social History     Tobacco Use    Smoking status: Never Smoker    Smokeless tobacco: Never Used   Substance Use Topics    Alcohol use: No     Comment: socially    Drug use: No     Review of Systems   Constitutional: Negative for fever.   HENT: Negative for sore throat.    Respiratory: Negative for shortness of breath.    Cardiovascular: Negative for chest pain.   Gastrointestinal: Positive for abdominal pain (right lower) and nausea. Negative for vomiting.   Genitourinary: Negative for dysuria and pelvic pain.   Musculoskeletal: Negative for back pain.   Skin: Negative for rash.   Neurological: Negative for weakness.       Physical Exam     Initial Vitals [07/31/19 0746]   BP Pulse Resp Temp SpO2   139/86 92 15 98.4 °F (36.9 °C) 98 %      MAP       --         Physical Exam    Nursing note and vitals reviewed.  Constitutional: She appears well-developed and well-nourished. She is not diaphoretic. She is Obese . No distress.   HENT:   Head: Normocephalic and atraumatic.   Nose: Nose normal.   Eyes: Conjunctivae and EOM are normal. Right eye exhibits no discharge. Left eye exhibits no discharge.   Neck: Normal range of motion. No tracheal deviation present. No JVD present.   Cardiovascular: Normal rate, regular rhythm and normal heart sounds. Exam reveals no friction rub.    No murmur heard.  Pulmonary/Chest: Breath sounds normal. No stridor. No respiratory distress. She has no wheezes. She has no rhonchi. She has no rales. She exhibits no tenderness.   Abdominal: Soft. She exhibits no distension. There is tenderness  (RLQ). There is no rigidity, no rebound, no guarding, no CVA tenderness, no tenderness at McBurney's point and negative Laamr's sign.   Musculoskeletal: Normal range of motion.   Neurological: She is alert and oriented to person, place, and time.   Skin: Skin is warm and dry. No rash and no abscess noted. No erythema. No pallor.         ED Course   Procedures  Labs Reviewed   C-REACTIVE PROTEIN - Abnormal; Notable for the following components:       Result Value    CRP 15.6 (*)     All other components within normal limits   URINALYSIS, REFLEX TO URINE CULTURE - Abnormal; Notable for the following components:    Occult Blood UA 1+ (*)     All other components within normal limits    Narrative:     Preferred Collection Type->Urine, Clean Catch   URINALYSIS MICROSCOPIC - Abnormal; Notable for the following components:    RBC, UA 8 (*)     All other components within normal limits    Narrative:     Preferred Collection Type->Urine, Clean Catch   CBC W/ AUTO DIFFERENTIAL   COMPREHENSIVE METABOLIC PANEL   LIPASE   POCT URINE PREGNANCY          Imaging Results          US Pelvis Complete Non OB (Final result)  Result time 07/31/19 09:44:59   Procedure changed from US Transvaginal Non OB     Final result by Patience Boogie MD (07/31/19 09:44:59)                 Impression:      Normal flow is seen to both ovaries.      Electronically signed by: Patience Boogie MD  Date:    07/31/2019  Time:    09:44             Narrative:    EXAMINATION:  US PELVIS COMPLETE NON OB    CLINICAL HISTORY:  RLQ pain;  Right lower quadrant pain    TECHNIQUE:  Sonographic evaluation of the pelvis was performed.    COMPARISON:  None    FINDINGS:  The uterus is anteverted measuring 10.4 cm in length by 4.6 x 6.3 cm.  The endometrial stripe measures 2.8 mm.    The right ovary measures 2.7 x 1.9 x 2.8 cm in the left ovary measures 3.9 x 2.4 x 3.1 cm.                               CT Abdomen Pelvis  Without Contrast (Final result)  Result time  07/31/19 09:24:23   Procedure changed from CT Abdomen Pelvis With Contrast     Final result by Mian Mercado MD (07/31/19 09:24:23)                 Impression:      1. No interval change when compared to 1 day prior.  2. A few prominent but nonenlarged ileocolic mesenteric lymph nodes without appreciable abnormalities of the adjacent bowel on this partially limited nonenhanced and unopacified exam, unchanged and most compatible with mesenteric adenitis/enteritis.      Electronically signed by: Mian Mercado  Date:    07/31/2019  Time:    09:24             Narrative:    EXAMINATION:  CT ABDOMEN PELVIS WITHOUT CONTRAST    CLINICAL HISTORY:  Acute onset of severe right lower quadrant abdominal pain with concerns for acute appendicitis.    TECHNIQUE:  Low dose axial images, sagittal and coronal reformations were obtained from the lung bases to the pubic symphysis. IV and oral contrast were not utilized, limiting evaluation of vascular structures as well as soft tissue and hollow viscus organs.    COMPARISON:  CT abdomen/pelvis performed 1 day prior    FINDINGS:  Heart: Heart is not enlarged. No significant pericardial thickening in the field of view.    Lung Bases: Imaged portions of the lung bases are clear.    Hepatobiliary: Diffuse hepatic steatosis, unchanged.  Otherwise, the liver is normal in size and morphology without appreciable surface nodularity. No sizable suspicious mass on this limited non-enhanced exam.  Gallbladder is grossly unremarkable without radiodense stones, mural thickening, pericholecystic fluid or pericholecystic fat stranding.  No biliary dilatation.    Pancreas: Pancreas is normal in size and morphology without appreciable surrounding fat stranding, sizable fluid or sizable suspicious mass on this partially limited non-enhanced exam.    Spleen: Spleen is normal in size and morphology without appreciable sizable parenchymal mass on this non-enhanced exam.    Adrenals: Adrenal glands are  symmetric and normal in size and morphology without appreciable nodularity.    Kidneys: Kidneys are normal in location, size, attenuation and morphology. Two punctate nonobstructing calyceal nephroliths in the left mid and inferior pole, unchanged.  No obstructing nephrolith or hydroureteronephrosis bilaterally.  No appreciable sizable suspicious mass on this limited non-enhanced exam.    Bladder: Urinary bladder is well distended and is grossly unremarkable without evidence of mural thickening or nodularity.    GI Tract: The unenhanced and unopacified stomach, small bowel and colon are normal in course and caliber without evidence of bowel obstruction. No significant fat stranding. No free air. No free fluid. Appendix is normal.    Mesentery: A few prominent but nonenlarged right lower quadrant/pericecal mesenteric lymph nodes without appreciable abnormalities of the adjacent bowel on this partially limited nonenhanced and unopacified exam, not significantly changed.    Retroperitoneum: Aorta is normal in course and caliber without evidence of aneurysmal degeneration.  No sizable suspicious retroperitoneal mass, fluid collection or lymphadenopathy.    Pelvis: Leiomyomatous uterus, unchanged.  1.7-cm left adnexal cystic structure, unchanged.  No appreciable sizable pelvic mass on this partially limited nonenhanced exam.  No appreciable free fluid or organized/drainable fluid collection.  No pelvic lymphadenopathy.    Soft tissues: Imaged/visualized soft tissues are grossly unremarkable.    Osseous structures: Multilevel degenerative changes of the imaged spine.  No acute displaced fracture, dislocation or suspicious lytic/blastic osseous lesions.                                 Medical Decision Making:   History:   Old Medical Records: I decided to obtain old medical records.  Independently Interpreted Test(s):   I have ordered and independently interpreted X-rays - see prior notes.  Clinical Tests:   Lab Tests:  Ordered and Reviewed  Radiological Study: Ordered and Reviewed    This is an emergent evaluation of a 28 y.o. female with presenting to the ED for RLQ abdominal pain. Afebrile.     Will order screening labs and imaging while treating symptoms and monitoring patient.     Symptoms improve. Remains well appearing. Repeat abdominal exam benign. Vitals stable. Tolerating PO in ED.     CT of abdomen shows no acute appendicitis, obstruction, ureteral stone, or pyelonephritis.  There is enteritis noted, which could be contributing to her symptoms.  No overlying shingles or cellulitis.  No ovarian torsion or tubo-ovarian abscess today on ultrasound.  No history of trauma.     Discharged home with supportive care Cipro to be taken in addition to Flagyl that she was issued yesterday.  Also sent home with supportive care. Instructed to follow up with PCP and GI for reevaluation and management of symptoms.     I discussed with the patient the diagnosis, treatment plan, indications for return to the emergency department, and for expected follow-up. The patient verbalized an understanding. The patient is asked if there are any questions or concerns. We discuss the case, until all issues are addressed to the patients satisfaction. Patient understands and is agreeable to the plan.                     Clinical Impression:       ICD-10-CM ICD-9-CM   1. Enteritis K52.9 558.9   2. RLQ abdominal pain R10.31 789.03   3. Nausea R11.0 787.02   4. History of IBS Z87.19 V12.79            I, Dave Leary PA-C, personally performed the services described in this documentation. All medical record entries made by the scribe were at my direction and in my presence.  I have reviewed the chart and agree that the record reflects my personal performance and is accurate and complete.                    Dave Leary PA-C  07/31/19 1109

## 2019-07-31 NOTE — ED NOTES
EM PHYSICIAN NOTE: see also NP note    HPI  This patient presents with a complaint of   Chief Complaint   Patient presents with    Abdominal Pain     Pt reports abdominal pain localized to RLQ. Pt reports still havign her appendix. denies flank pain. Pt denies taking any medication for symptoms. Denies fever, chills, nausea or vomiting.        HPI:  This is a 28-year-old woman who reports the emergency department with right lower quadrant pain, patient reports that she has had intermittent pain on the right side but over the past 2-3 days it has been worsening.  Today she is at a point where when she sits or if she hits a bump when driving her pain is exacerbated.  She had a miscarriage approximately 6 months ago.  Otherwise she has not been pregnant and does desire fertility.    REVIEW of PMH, SOC History and Family History:  Past Medical History:   Diagnosis Date    Anxiety     Back problem     Hypertension     IBS (irritable bowel syndrome)     Migraine headache     Obesity      History reviewed. No pertinent surgical history.  Social History     Socioeconomic History    Marital status: Single     Spouse name: Not on file    Number of children: Not on file    Years of education: Not on file    Highest education level: Not on file   Occupational History    Not on file   Social Needs    Financial resource strain: Not on file    Food insecurity:     Worry: Not on file     Inability: Not on file    Transportation needs:     Medical: Not on file     Non-medical: Not on file   Tobacco Use    Smoking status: Never Smoker    Smokeless tobacco: Never Used   Substance and Sexual Activity    Alcohol use: No     Comment: socially    Drug use: No    Sexual activity: Yes     Partners: Male     Birth control/protection: Condom   Lifestyle    Physical activity:     Days per week: Not on file     Minutes per session: Not on file    Stress: Not on file   Relationships    Social connections:     Talks on  phone: Not on file     Gets together: Not on file     Attends [employer] service: Not on file     Active member of club or organization: Not on file     Attends meetings of clubs or organizations: Not on file     Relationship status: Not on file   Other Topics Concern    Not on file   Social History Narrative    Together since 2010    He drives trucks    She is a      Patient Active Problem List   Diagnosis    Hypertension    Migraines    Morbid obesity with BMI of 50.0-59.9, adult     Current Facility-Administered Medications   Medication Dose Route Frequency Provider Last Rate Last Dose    morphine injection 3 mg  3 mg Intravenous ED 1 Time Moshe Floyd, DNP         Current Outpatient Medications   Medication Sig Dispense Refill    ketoconazole (NIZORAL) 2 % shampoo USE SHAMPOO ONCE DAILY  2    LORazepam (ATIVAN) 0.5 MG tablet Take 0.5 mg by mouth 2 (two) times daily.  2    metoprolol tartrate (LOPRESSOR) 50 MG tablet Take 50 mg by mouth once daily.      NIFEdipine (PROCARDIA XL) 30 MG (OSM) 24 hr tablet Procardia XL 30 mg tablet,extended release   Take 1 tablet twice a day by oral route.      promethazine (PHENERGAN) 25 MG tablet Take 25 mg by mouth every 4 (four) hours as needed for Nausea (IBS).      topiramate (TOPAMAX) 50 MG tablet Take 50 mg by mouth 2 (two) times daily with meals.  0    diazePAM (VALIUM) 5 MG tablet TAKE 1 BY MOUTH NIGHT BEFORE MRI, THEN 1 TABLET 2 HOURS PRIOR TO MRI. MAY REPEAT 1 TABLET IF NEEDED BEFORE MRI. DO NOT DRIVE  0    dicyclomine (BENTYL) 10 MG capsule Take 10 mg by mouth 3 (three) times daily as needed (IBS).      hydroCHLOROthiazide (HYDRODIURIL) 12.5 MG Tab TAKE ONE TABLET BY MOUTH ONCE DAILY FOR FLUID and/or FOR BLOOD PRESSURE.  5    hydroquinone 4 % Crea APPLY TOPICALLY TO DARK SPOTS TWICE DAILY  3    hydrOXYzine pamoate (VISTARIL) 50 MG Cap Take 1 capsule (50 mg total) by mouth every 6 (six) hours as needed (anxiety). 30 capsule 0    metFORMIN  (GLUCOPHAGE) 500 MG tablet Take 1 tablet (500 mg total) by mouth daily with breakfast. 30 tablet 0    valACYclovir (VALTREX) 1000 MG tablet Take 2,000 mg by mouth 2 (two) times daily.  0     Review of patient's allergies indicates:   Allergen Reactions    Hydromorphone (bulk) Itching     There is no immunization history for the selected administration types on file for this patient.  Family History   Problem Relation Age of Onset    Heart attack Paternal Grandfather     Cancer Paternal Grandmother 50        Pancreatic cancer    Emphysema Maternal Grandfather     Breast cancer Neg Hx     Colon cancer Neg Hx     Ovarian cancer Neg Hx        REVIEW of SYSTEMS  Source:  Patient   The nurse's notes and triage vital signs were reviewed.  GENERAL/CONSTITUTIONAL: There is no report of fever, fatigue, weakness, or unexplained weight loss.  CARDIOVASCULAR: There is no report of chest pain   RESPIRATORY: There is no report of cough or SOB  GASTROINTESTINAL: There is no report of nausea or vomiting  MUSCULOSKELETAL: There is no report of joint or muscle pain. No muscle weakness or tenderness.  SKIN AND BREASTS: There is no report of easy bruising, skin redness, skin rash.  HEMATOLOGIC/LYMPHATIC: There is no report of anemia, bleeding or clotting defects. There is no report of anticoagulant use.  The remainder of the ROS is negative.    PHYSICAL EXAMINATION    ED Triage Vitals [07/30/19 1703]   Enc Vitals Group      BP (!) 172/79      Pulse 65      Resp 20      Temp 98.9 °F (37.2 °C)      Temp src Oral      SpO2 97 %      Weight (!) 397 lb      Height 6'      Head Circumference       Peak Flow       Pain Score       Pain Loc       Pain Edu?       Excl. in GC?      Vital signs and Pulse Ox reviewed in clinical context. Abnormalities noted: Hypertension noted and patient will be referred to primary care physician for close follow-up  Pt's level of consciousness is alert, and the patient is in mild distress.  Skin: warm,  pink and dry  Mucosa:moist  Head and Neck: WNL  Cardiac exam: RRR  Pulmonary exam: unlabored and clear  Abd Exam: soft, with ttp rlq  Musculoskeletal: no joint tenderness, deformity or swelling   Neurologic: GCS 15. 5 over 5 strength, normal gait, cranial nerves intact, neck supple     Medical decision making: Nursing notes reviewed and incorporated  Admission on 07/30/2019   Component Date Value Ref Range Status    POC Preg Test, Ur 07/30/2019 Negative  Negative Final     Acceptable 07/30/2019 Yes   Final    WBC 07/30/2019 7.72  3.90 - 12.70 K/uL Final    RBC 07/30/2019 4.81  4.00 - 5.40 M/uL Final    Hemoglobin 07/30/2019 13.3  12.0 - 16.0 g/dL Final    Hematocrit 07/30/2019 41.3  37.0 - 48.5 % Final    Mean Corpuscular Volume 07/30/2019 86  82 - 98 fL Final    Mean Corpuscular Hemoglobin 07/30/2019 27.7  27.0 - 31.0 pg Final    Mean Corpuscular Hemoglobin Conc 07/30/2019 32.2  32.0 - 36.0 g/dL Final    RDW 07/30/2019 13.3  11.5 - 14.5 % Final    Platelets 07/30/2019 364* 150 - 350 K/uL Final    MPV 07/30/2019 9.4  9.2 - 12.9 fL Final    Gran # (ANC) 07/30/2019 4.2  1.8 - 7.7 K/uL Final    Lymph # 07/30/2019 2.8  1.0 - 4.8 K/uL Final    Mono # 07/30/2019 0.6  0.3 - 1.0 K/uL Final    Eos # 07/30/2019 0.1  0.0 - 0.5 K/uL Final    Baso # 07/30/2019 0.02  0.00 - 0.20 K/uL Final    Gran% 07/30/2019 54.0  38.0 - 73.0 % Final    Lymph% 07/30/2019 36.5  18.0 - 48.0 % Final    Mono% 07/30/2019 7.9  4.0 - 15.0 % Final    Eosinophil% 07/30/2019 1.4  0.0 - 8.0 % Final    Basophil% 07/30/2019 0.3  0.0 - 1.9 % Final    Differential Method 07/30/2019 Automated   Final    Sodium 07/30/2019 142  136 - 145 mmol/L Final    Potassium 07/30/2019 4.4  3.5 - 5.1 mmol/L Final    Chloride 07/30/2019 104  95 - 110 mmol/L Final    CO2 07/30/2019 29  23 - 29 mmol/L Final    Glucose 07/30/2019 98  70 - 110 mg/dL Final    BUN, Bld 07/30/2019 14  6 - 20 mg/dL Final    Creatinine 07/30/2019 1.0  0.5 -  1.4 mg/dL Final    Calcium 07/30/2019 9.8  8.7 - 10.5 mg/dL Final    Total Protein 07/30/2019 7.7  6.0 - 8.4 g/dL Final    Albumin 07/30/2019 3.9  3.5 - 5.2 g/dL Final    Total Bilirubin 07/30/2019 0.3  0.1 - 1.0 mg/dL Final    Comment: For infants and newborns, interpretation of results should be based  on gestational age, weight and in agreement with clinical  observations.  Premature Infant recommended reference ranges:  Up to 24 hours.............<8.0 mg/dL  Up to 48 hours............<12.0 mg/dL  3-5 days..................<15.0 mg/dL  6-29 days.................<15.0 mg/dL      Alkaline Phosphatase 07/30/2019 68  55 - 135 U/L Final    AST 07/30/2019 12  10 - 40 U/L Final    ALT 07/30/2019 14  10 - 44 U/L Final    Anion Gap 07/30/2019 9  8 - 16 mmol/L Final    eGFR if African American 07/30/2019 >60  >60 mL/min/1.73 m^2 Final    eGFR if non African American 07/30/2019 >60  >60 mL/min/1.73 m^2 Final    Comment: Calculation used to obtain the estimated glomerular filtration  rate (eGFR) is the CKD-EPI equation.       Specimen UA 07/30/2019 Urine, Clean Catch   Final    Color, UA 07/30/2019 Yellow  Yellow, Straw, Liz Final    Appearance, UA 07/30/2019 Clear  Clear Final    pH, UA 07/30/2019 7.0  5.0 - 8.0 Final    Specific Gravity, UA 07/30/2019 1.020  1.005 - 1.030 Final    Protein, UA 07/30/2019 Negative  Negative Final    Comment: Recommend a 24 hour urine protein or a urine   protein/creatinine ratio if globulin induced proteinuria is  clinically suspected.      Glucose, UA 07/30/2019 Negative  Negative Final    Ketones, UA 07/30/2019 Negative  Negative Final    Bilirubin (UA) 07/30/2019 Negative  Negative Final    Occult Blood UA 07/30/2019 1+* Negative Final    Nitrite, UA 07/30/2019 Negative  Negative Final    Urobilinogen, UA 07/30/2019 Negative  <2.0 EU/dL Final    Leukocytes, UA 07/30/2019 Negative  Negative Final    Trichomonas 07/30/2019 None  None Final    Clue Cells 07/30/2019  Few* None Final    Budding Yeast 07/30/2019 None  None Final    Fungal Hyphae 07/30/2019 None  None Final    WBC - Vaginal Screen 07/30/2019 None  None Final    Bacteria - Vaginal Screen 07/30/2019 Moderate* None Final    Wet Prep Source 07/30/2019 Vagina  None Final    RBC, UA 07/30/2019 10* 0 - 4 /hpf Final    WBC, UA 07/30/2019 1  0 - 5 /hpf Final    Bacteria 07/30/2019 Few* None-Occ /hpf Final    Squam Epithel, UA 07/30/2019 10  /hpf Final    Microscopic Comment 07/30/2019 SEE COMMENT   Final    Comment: Other formed elements not mentioned in the report are not   present in the microscopic examination.          Imaging Results           US Pelvis Comp with Transvag NON-OB (xpd) (Final result)  Result time 07/30/19 20:30:56   Procedure changed from US Pelvis Complete Non OB     Final result by Minh Rachel MD (07/30/19 20:30:56)                 Impression:      Arterial and venous flow was not well demonstrated at the right ovary which could be related to technical factors; however, right-sided ovarian torsion cannot be excluded on the basis of this examination.  Further evaluation/follow-up as warranted.    Solitary 1.5 cm intramural fibroid.    This report was flagged in Epic as abnormal.      Electronically signed by: Minh Rachel MD  Date:    07/30/2019  Time:    20:30             Narrative:    EXAMINATION:  US PELVIS COMP WITH TRANSVAG NON-OB (XPD)    CLINICAL HISTORY:  right lower quad abd pain;    TECHNIQUE:  Transabdominal sonography of the pelvis was performed, followed by transvaginal sonography to better evaluate the uterus and ovaries.    COMPARISON:  CT abdomen and pelvis earlier same day    FINDINGS:  Uterus:    Measures 8.6 x 4.5 x 3.9 cm in dimensions.  There is a solitary 1.3 x 1.5 x 1.5 cm intramural fibroid.  The endometrium is normal thickness measuring 2 mm.  Small nabothian cysts noted.    Ovaries:    The ovaries are normal in size.  The right ovary measures 1.7 x 2.5 x 1.8 cm.   The left ovary measures 2.3 x 3 x 2.1 cm, containing a 1.6 cm simple appearing dominant follicle.   Appropriate arterial and venous flow demonstrated at the left ovary.  Arterial and venous flow was not well demonstrated at the right ovary.    Free Fluid:    None.                               CT Abdomen Pelvis With Contrast (Final result)  Result time 07/30/19 18:49:19    Final result by Minh Rachel MD (07/30/19 18:49:19)                 Impression:      1. No acute process or CT findings identified to explain patient's symptoms of right lower quadrant pain on this noncontrast CT.  Specifically, no evidence of appendicitis.  2. Splenomegaly.  3. Left adnexal 1.7 cm hypodense area that may represent a complex/hemorrhagic dominant follicle.  Further evaluation with elective/nonemergent pelvic ultrasound can be obtained as warranted.  4. Few additional findings as above.      Electronically signed by: Minh Rachel MD  Date:    07/30/2019  Time:    18:49             Narrative:    EXAMINATION:  CT ABDOMEN PELVIS WITH CONTRAST    CLINICAL HISTORY:  RLQ pain, appendicitis suspected;    TECHNIQUE:  Low dose axial images, sagittal and coronal reformations were obtained from the lung bases to the pubic symphysis following the IV administration of 100 mL of Omnipaque 350 .  Oral contrast was not given.    COMPARISON:  CT thorax 07/27/2017 and CT renal stone study 03/29/2014; abdominal ultrasound 12/04/2013    FINDINGS:  Imaged lung bases are clear.  Base of the heart is within normal limits.    Spleen is enlarged without focal process seen.  Liver, gallbladder, pancreas, stomach, duodenum and bilateral adrenal glands are within normal limits.  No biliary ductal dilatation.  Suspected 3 small accessory splenules at the splenic hilum.    Bilateral kidneys are normal in size, shape and location with relatively symmetric enhancement.  Two punctate nephroliths at the left mid and lower poles.  No hydronephrosis or significant  perinephric stranding.  Ureters are nondilated.  Urinary bladder is suboptimally distended.  Uterus is somewhat anteverted and tilted towards the right.  1.7 cm somewhat rounded hypodense area at the left adnexal region with average 34 Hounsfield units not consistent with a simple cyst.  No large amount of free fluid seen within the pelvis.    No ascites, free air or lymphadenopathy by CT criteria.  No significant atherosclerosis.  No aortic aneurysm or dissection.    Tiny fat containing umbilical hernia.  Appendix and terminal ileum are within normal limits.  No evidence of bowel obstruction or inflammation.  No pneumatosis or portal venous gas.    Osseous structures are intact.                                Medications   morphine injection 3 mg (3 mg Intravenous Not Given 7/30/19 1900)   sodium chloride 0.9% bolus 1,000 mL (0 mLs Intravenous Stopped 7/30/19 1940)   ketorolac injection 9.999 mg (9.999 mg Intravenous Given 7/30/19 1806)   iohexol (OMNIPAQUE 350) injection 100 mL (100 mLs Intravenous Given 7/30/19 1815)       ED Course as of Jul 30 2121   Tue Jul 30, 2019   1745 CBC: leukocyte count was normal, the H&H was normal. The platelet count was increased.       CBC auto differential(!) [VC]   1802 The chemistry was negative for hypo-or hyper natremia, kalemia, chloridemia, or other electrolyte abnormalities; BUN and creatinine were within normal limits indicating normal kidney function, ALT and AST were within normal limits indicating normal liver function, there was no transaminitis.       Comprehensive metabolic panel [VC]   1804 Preg Test, Ur: Negative [VC]   1833 UA is negative for infection, no nitrites, leukocytes, or protein present. Pos for hematuria.  Pt has uterus and finished menstruating last week.     Urinalysis, Reflex to Urine Culture Urine, Clean Catch(!) [VC]   1833 Pending at time of disposition.   C. trachomatis/N. gonorrhoeae by AMP DNA [VC]   1835 Gyn exam performed with Radha Boyer  LESLEE as chaperone assistant.    [VC]   1852 1. No acute process or CT findings identified to explain patient's symptoms of right lower quadrant pain on this noncontrast CT.  Specifically, no evidence of appendicitis.  2. Splenomegaly.  3. Left adnexal 1.7 cm hypodense area that may represent a complex/hemorrhagic dominant follicle.  Further evaluation with elective/nonemergent pelvic ultrasound can be obtained as warranted.  4. Few additional findings as above.   CT Abdomen Pelvis With Contrast [VC]   1924 Pos for bv, will treat with flagyl   Clue Cells, Wet Prep(!): Few [VC]   1933 Awaiting result.   US Pelvis Complete Non OB [VC]   2036 Arterial and venous flow was not well demonstrated at the right ovary which could be related to technical factors; however, right-sided ovarian torsion cannot be excluded on the basis of this examination.  Further evaluation/follow-up as warranted.    Solitary 1.5 cm intramural fibroid.   US Pelvis Comp with Transvag NON-OB (xpd)(!) [VC]   2040 US Pelvis Comp with Transvag NON-OB (xpd)(!) [MH]   2042 Few clue cells on vaginal screen   Clue Cells, Wet Prep(!): Few [MH]   2042 CMP is normal   Comprehensive metabolic panel [MH]   2042 CBC is normal   CBC auto differential(!) [MH]   2042 Trace blood on UA   Occult Blood UA(!): 1+ [MH]   2043 CT:1.7 cm somewhat rounded hypodense area at the left adnexal region with average 34 Hounsfield units not consistent with a simple cyst    [MH]   2044 DR. Vaughan given SBAR report.  She would like me to discuss pt with OBGYN.     [VC]   2045 Us: Arterial and venous flow was not well demonstrated at the right ovary.      [MH]      ED Course User Index  [] Timothy Vaughan MD  [VC] Moshe Floyd, DIANA     This case was discussed with GYN as well as the Radiologist.  Patient's history is somewhat consistent with torsion and detorsion, but there is no enlargement of the ovary.  Flow was not demonstrated but it was uncertain if it was due to  technique.    Impression: pelvic pain r/o torsion  Plan:  We will repeat the ultrasound and attempt to evaluate the right ovary for flow  Timothy Vaughan MD, 9:19 PM 7/30/2019    This note was created using Dictation Software.  This program may occasionally misinterpret certain words and phrases.                     Timothy Vaughan MD  07/30/19 [id number]

## 2019-09-21 ENCOUNTER — HOSPITAL ENCOUNTER (EMERGENCY)
Facility: HOSPITAL | Age: 29
Discharge: HOME | End: 2019-09-21
Attending: EMERGENCY MEDICINE

## 2019-09-21 VITALS
DIASTOLIC BLOOD PRESSURE: 76 MMHG | SYSTOLIC BLOOD PRESSURE: 158 MMHG | HEART RATE: 83 BPM | WEIGHT: 293 LBS | BODY MASS INDEX: 39.68 KG/M2 | TEMPERATURE: 98 F | OXYGEN SATURATION: 99 % | HEIGHT: 72 IN | RESPIRATION RATE: 18 BRPM

## 2019-09-21 DIAGNOSIS — N20.0 NEPHROLITHIASIS: Primary | ICD-10-CM

## 2019-09-21 LAB
ALBUMIN SERPL BCP-MCNC: 3.8 G/DL (ref 3.5–5.2)
ALP SERPL-CCNC: 67 U/L (ref 55–135)
ALT SERPL W/O P-5'-P-CCNC: 14 U/L (ref 10–44)
ANION GAP SERPL CALC-SCNC: 9 MMOL/L (ref 8–16)
AST SERPL-CCNC: 12 U/L (ref 10–40)
B-HCG UR QL: NEGATIVE
BACTERIA #/AREA URNS HPF: ABNORMAL /HPF
BASOPHILS # BLD AUTO: 0.03 K/UL (ref 0–0.2)
BASOPHILS NFR BLD: 0.4 % (ref 0–1.9)
BILIRUB SERPL-MCNC: 0.4 MG/DL (ref 0.1–1)
BILIRUB UR QL STRIP: NEGATIVE
BUN SERPL-MCNC: 12 MG/DL (ref 6–20)
CALCIUM SERPL-MCNC: 9.6 MG/DL (ref 8.7–10.5)
CHLORIDE SERPL-SCNC: 107 MMOL/L (ref 95–110)
CLARITY UR: ABNORMAL
CO2 SERPL-SCNC: 24 MMOL/L (ref 23–29)
COLOR UR: ABNORMAL
CREAT SERPL-MCNC: 1.1 MG/DL (ref 0.5–1.4)
CTP QC/QA: YES
DIFFERENTIAL METHOD: ABNORMAL
EOSINOPHIL # BLD AUTO: 0.1 K/UL (ref 0–0.5)
EOSINOPHIL NFR BLD: 1.1 % (ref 0–8)
ERYTHROCYTE [DISTWIDTH] IN BLOOD BY AUTOMATED COUNT: 13.5 % (ref 11.5–14.5)
EST. GFR  (AFRICAN AMERICAN): >60 ML/MIN/1.73 M^2
EST. GFR  (NON AFRICAN AMERICAN): >60 ML/MIN/1.73 M^2
GLUCOSE SERPL-MCNC: 106 MG/DL (ref 70–110)
GLUCOSE UR QL STRIP: NEGATIVE
HCT VFR BLD AUTO: 42.9 % (ref 37–48.5)
HGB BLD-MCNC: 13.3 G/DL (ref 12–16)
HGB UR QL STRIP: ABNORMAL
HYALINE CASTS #/AREA URNS LPF: 0 /LPF
KETONES UR QL STRIP: NEGATIVE
LEUKOCYTE ESTERASE UR QL STRIP: ABNORMAL
LIPASE SERPL-CCNC: 21 U/L (ref 4–60)
LYMPHOCYTES # BLD AUTO: 2.4 K/UL (ref 1–4.8)
LYMPHOCYTES NFR BLD: 28.2 % (ref 18–48)
MCH RBC QN AUTO: 26 PG (ref 27–31)
MCHC RBC AUTO-ENTMCNC: 31 G/DL (ref 32–36)
MCV RBC AUTO: 84 FL (ref 82–98)
MICROSCOPIC COMMENT: ABNORMAL
MONOCYTES # BLD AUTO: 0.9 K/UL (ref 0.3–1)
MONOCYTES NFR BLD: 10.6 % (ref 4–15)
NEUTROPHILS # BLD AUTO: 5 K/UL (ref 1.8–7.7)
NEUTROPHILS NFR BLD: 59.7 % (ref 38–73)
NITRITE UR QL STRIP: NEGATIVE
PH UR STRIP: 6 [PH] (ref 5–8)
PLATELET # BLD AUTO: 413 K/UL (ref 150–350)
PMV BLD AUTO: 9.4 FL (ref 9.2–12.9)
POTASSIUM SERPL-SCNC: 4 MMOL/L (ref 3.5–5.1)
PROT SERPL-MCNC: 7.7 G/DL (ref 6–8.4)
PROT UR QL STRIP: ABNORMAL
RBC # BLD AUTO: 5.12 M/UL (ref 4–5.4)
RBC #/AREA URNS HPF: >100 /HPF (ref 0–4)
SODIUM SERPL-SCNC: 140 MMOL/L (ref 136–145)
SP GR UR STRIP: 1.03 (ref 1–1.03)
SQUAMOUS #/AREA URNS HPF: 6 /HPF
URN SPEC COLLECT METH UR: ABNORMAL
UROBILINOGEN UR STRIP-ACNC: ABNORMAL EU/DL
WBC # BLD AUTO: 8.34 K/UL (ref 3.9–12.7)
WBC #/AREA URNS HPF: 2 /HPF (ref 0–5)

## 2019-09-21 PROCEDURE — 85025 COMPLETE CBC W/AUTO DIFF WBC: CPT

## 2019-09-21 PROCEDURE — 80053 COMPREHEN METABOLIC PANEL: CPT

## 2019-09-21 PROCEDURE — 99285 EMERGENCY DEPT VISIT HI MDM: CPT | Mod: 25

## 2019-09-21 PROCEDURE — 81000 URINALYSIS NONAUTO W/SCOPE: CPT

## 2019-09-21 PROCEDURE — 25000003 PHARM REV CODE 250: Performed by: EMERGENCY MEDICINE

## 2019-09-21 PROCEDURE — 83690 ASSAY OF LIPASE: CPT

## 2019-09-21 PROCEDURE — 63600175 PHARM REV CODE 636 W HCPCS: Performed by: EMERGENCY MEDICINE

## 2019-09-21 PROCEDURE — 96374 THER/PROPH/DIAG INJ IV PUSH: CPT

## 2019-09-21 PROCEDURE — 96375 TX/PRO/DX INJ NEW DRUG ADDON: CPT

## 2019-09-21 PROCEDURE — 96376 TX/PRO/DX INJ SAME DRUG ADON: CPT

## 2019-09-21 PROCEDURE — 81025 URINE PREGNANCY TEST: CPT | Performed by: EMERGENCY MEDICINE

## 2019-09-21 PROCEDURE — 96361 HYDRATE IV INFUSION ADD-ON: CPT

## 2019-09-21 RX ORDER — METOPROLOL SUCCINATE 50 MG/1
50 TABLET, EXTENDED RELEASE ORAL
COMMUNITY
End: 2019-12-09

## 2019-09-21 RX ORDER — MORPHINE SULFATE 10 MG/ML
5 INJECTION INTRAMUSCULAR; INTRAVENOUS; SUBCUTANEOUS
Status: COMPLETED | OUTPATIENT
Start: 2019-09-21 | End: 2019-09-21

## 2019-09-21 RX ORDER — CEPHALEXIN 500 MG/1
500 CAPSULE ORAL EVERY 12 HOURS
Qty: 20 CAPSULE | Refills: 0 | Status: SHIPPED | OUTPATIENT
Start: 2019-09-21 | End: 2019-10-01

## 2019-09-21 RX ORDER — ONDANSETRON 2 MG/ML
4 INJECTION INTRAMUSCULAR; INTRAVENOUS
Status: COMPLETED | OUTPATIENT
Start: 2019-09-21 | End: 2019-09-21

## 2019-09-21 RX ORDER — KETOROLAC TROMETHAMINE 30 MG/ML
15 INJECTION, SOLUTION INTRAMUSCULAR; INTRAVENOUS
Status: COMPLETED | OUTPATIENT
Start: 2019-09-21 | End: 2019-09-21

## 2019-09-21 RX ORDER — LORAZEPAM 1 MG/1
1 TABLET ORAL
COMMUNITY
End: 2019-12-09

## 2019-09-21 RX ORDER — CEPHALEXIN 250 MG/1
500 CAPSULE ORAL
Status: COMPLETED | OUTPATIENT
Start: 2019-09-21 | End: 2019-09-21

## 2019-09-21 RX ORDER — MORPHINE SULFATE 10 MG/ML
6 INJECTION INTRAMUSCULAR; INTRAVENOUS; SUBCUTANEOUS
Status: COMPLETED | OUTPATIENT
Start: 2019-09-21 | End: 2019-09-21

## 2019-09-21 RX ORDER — OXYCODONE AND ACETAMINOPHEN 10; 325 MG/1; MG/1
1 TABLET ORAL EVERY 4 HOURS PRN
Qty: 18 TABLET | Refills: 0 | OUTPATIENT
Start: 2019-09-21 | End: 2019-10-07

## 2019-09-21 RX ORDER — MEDROXYPROGESTERONE ACETATE 10 MG/1
10 TABLET ORAL
COMMUNITY
End: 2019-12-09

## 2019-09-21 RX ADMIN — CEPHALEXIN 500 MG: 250 CAPSULE ORAL at 06:09

## 2019-09-21 RX ADMIN — KETOROLAC TROMETHAMINE 15 MG: 30 INJECTION, SOLUTION INTRAMUSCULAR; INTRAVENOUS at 05:09

## 2019-09-21 RX ADMIN — ONDANSETRON HYDROCHLORIDE 4 MG: 2 SOLUTION INTRAMUSCULAR; INTRAVENOUS at 05:09

## 2019-09-21 RX ADMIN — MORPHINE SULFATE 6 MG: 10 INJECTION INTRAVENOUS at 05:09

## 2019-09-21 RX ADMIN — SODIUM CHLORIDE 1000 ML: 0.9 INJECTION, SOLUTION INTRAVENOUS at 04:09

## 2019-09-21 RX ADMIN — MORPHINE SULFATE 5 MG: 10 INJECTION INTRAVENOUS at 06:09

## 2019-09-21 NOTE — ED NOTES
30y/o F to ED for severe abd pain starting last night with hematuria starting this morning. Pt moaning in pain.   Patient identifiers verified and correct for Elena Valencia.    LOC: The patient is awake, alert and aware of environment with an appropriate affect, the patient is oriented x 3 and speaking appropriately.  APPEARANCE: Patient in acute distress, patient is clean and well groomed, patient's clothing is properly fastened.  SKIN: The skin is warm and dry, color consistent with ethnicity, patient has normal skin turgor and moist mucus membranes, skin intact, no breakdown or bruising noted.  MUSCULOSKELETAL: Patient moving all extremities spontaneously, no obvious swelling or deformities noted.  RESPIRATORY: Airway is open and patent, respirations are spontaneous, patient has a normal effort and rate, no accessory muscle use noted, bilateral breath sounds clear.  CARDIAC: Patient has a normal rate and regular rhythm, no periphreal edema noted, capillary refill < 3 seconds.  ABDOMEN: Soft and tender to palpation, no distention noted, normoactive bowel sounds present in all four quadrants. Pain on the right side nonradiating  NEUROLOGIC:  eyes open spontaneously, behavior appropriate to situation, follows commands, facial expression symmetrical, bilateral hand grasp equal and even, purposeful motor response noted  Safety measures in place. Plan of care discussed. Family at bedside.

## 2019-09-21 NOTE — ED NOTES
Patient lying in the bed c/o of pain to the abdominal patient will receive pain medication as ordered. Nurse instructed patient to try to calm down medication is coming patient has friend at the bedside

## 2019-09-21 NOTE — ED PROVIDER NOTES
Encounter Date: 9/21/2019    SCRIBE #1 NOTE: I, Humaira Brewer, am scribing for, and in the presence of,  Claude Willson MD. I have scribed the following portions of the note - Other sections scribed: HPI, ROS, PE.       History     Chief Complaint   Patient presents with    Abdominal Pain     left side abdominal pain and bloody urine started last night; LMP 9/10; reports hx of IBS and HTN    Hematuria     CC: Flank Pain    HPI: This 29 y.o. Female with a medical history of HTN, Anxiety, IBS, and Migraines presents to the ED for evaluation of severe L sided flank pain with associated hematuria that began last night. She reports pain subsided after taking a shower this morning but soon returned. LMP was 9/10 and ended x6 days ago. She denies history of kidney stones, EtOH consumption or smoking. She denies fever, chills, congestion, rhinorrhea or emesis. No alleviating or exacerbating factors reported.     The history is provided by the patient. No  was used.     Review of patient's allergies indicates:   Allergen Reactions    Hydromorphone (bulk) Itching     Past Medical History:   Diagnosis Date    Anxiety     Back problem     Hypertension     IBS (irritable bowel syndrome)     Migraine headache     Obesity      History reviewed. No pertinent surgical history.  Family History   Problem Relation Age of Onset    Heart attack Paternal Grandfather     Cancer Paternal Grandmother 50        Pancreatic cancer    Emphysema Maternal Grandfather     Breast cancer Neg Hx     Colon cancer Neg Hx     Ovarian cancer Neg Hx      Social History     Tobacco Use    Smoking status: Never Smoker    Smokeless tobacco: Never Used   Substance Use Topics    Alcohol use: No     Comment: socially    Drug use: No     Review of Systems   Constitutional: Negative for chills and fever.   HENT: Negative for congestion.    Respiratory: Negative for cough and shortness of breath.    Cardiovascular:  Negative for chest pain.   Gastrointestinal: Negative for diarrhea, nausea and vomiting.   Genitourinary: Positive for flank pain and hematuria. Negative for dysuria.   Musculoskeletal: Negative for back pain and neck pain.   Skin: Negative for rash.   Neurological: Negative for headaches.       Physical Exam     Initial Vitals [09/21/19 1607]   BP Pulse Resp Temp SpO2   (!) 178/127 100 20 98.2 °F (36.8 °C) 96 %      MAP       --         Physical Exam    Nursing note and vitals reviewed.  Constitutional: She is not diaphoretic. No distress.   HENT:   Head: Normocephalic and atraumatic.   Mouth/Throat: Oropharynx is clear and moist.   Eyes: EOM are normal. Pupils are equal, round, and reactive to light. No scleral icterus.   Neck: Normal range of motion. Neck supple. No JVD present.   Cardiovascular: Normal rate, regular rhythm and intact distal pulses.   Pulmonary/Chest: Breath sounds normal. No stridor. No respiratory distress.   Abdominal: Soft. Bowel sounds are normal. She exhibits no distension. There is no tenderness.   Musculoskeletal: Normal range of motion. She exhibits no edema or tenderness.   Neurological: She is alert and oriented to person, place, and time. She has normal strength. No cranial nerve deficit or sensory deficit.   Skin: Skin is warm and dry.   Psychiatric: She has a normal mood and affect.         ED Course   Procedures  Labs Reviewed   CBC W/ AUTO DIFFERENTIAL - Abnormal; Notable for the following components:       Result Value    Mean Corpuscular Hemoglobin 26.0 (*)     Mean Corpuscular Hemoglobin Conc 31.0 (*)     Platelets 413 (*)     All other components within normal limits   URINALYSIS, REFLEX TO URINE CULTURE - Abnormal; Notable for the following components:    Color, UA Red (*)     Appearance, UA Hazy (*)     Protein, UA 2+ (*)     Occult Blood UA 3+ (*)     Urobilinogen, UA 2.0-3.0 (*)     Leukocytes, UA 1+ (*)     All other components within normal limits    Narrative:      Preferred Collection Type->Urine, Clean Catch   URINALYSIS MICROSCOPIC - Abnormal; Notable for the following components:    RBC, UA >100 (*)     Bacteria Moderate (*)     All other components within normal limits    Narrative:     Preferred Collection Type->Urine, Clean Catch   COMPREHENSIVE METABOLIC PANEL   LIPASE   POCT URINE PREGNANCY          Imaging Results           CT Abdomen Pelvis  Without Contrast (Final result)  Result time 09/21/19 17:19:51    Final result by Nba Ceja MD (09/21/19 17:19:51)                 Impression:      This report was flagged in Epic as abnormal.    1. Mild to moderate left hydroureteronephrosis secondary to a 0.3 cm calculus within the proximal left ureter.  There is additional nonobstructive left nephrolithiasis.  Correlation with urinalysis recommended to exclude superimposed infection.  2. Additional findings above.      Electronically signed by: Nba Ceja MD  Date:    09/21/2019  Time:    17:19             Narrative:    EXAMINATION:  CT ABDOMEN PELVIS WITHOUT CONTRAST    CLINICAL HISTORY:  Flank pain, stone disease suspected;left;    TECHNIQUE:  Low dose axial images, sagittal and coronal reformations were obtained from the lung bases to the pubic symphysis.  Oral contrast was not administered.    COMPARISON:  07/31/2019    FINDINGS:  Images of the lower thorax are unremarkable.    The liver, spleen, pancreas, gallbladder and adrenal glands have a grossly unremarkable noncontrast appearance.  There is no biliary dilation or ascites.  No significant abdominal lymphadenopathy.  The stomach is mildly distended with ingested content.    There is edematous change of the left kidney noting left nonobstructive nephrolithiasis.  There is mild to moderate left hydronephrosis, secondary to a 0.3 cm calculus within the proximal left ureter.  Distal to which, the ureter returns to normal caliber without additional calculi seen.  No right nephrolithiasis or hydronephrosis.   The right ureter is unremarkable without calculi seen.  The urinary bladder is decompressed, limiting evaluation.  The uterus and adnexa is unremarkable.    There are a few scattered colonic diverticula without inflammation.  The terminal ileum and appendix are unremarkable.  The small bowel is grossly unremarkable.  Scattered shotty periaortic and paracaval lymph nodes are noted.  Degenerative changes are noted of the spine.  No significant inguinal lymphadenopathy.                                  patient pain and nausea controlled.                     Clinical Impression:       ICD-10-CM ICD-9-CM   1. Nephrolithiasis N20.0 592.0       I, Claude Willson, personally performed the services described in this documentation. All medical record entries made by the scribe were at my direction and in my presence. I have reviewed the chart and agree that the record reflects my personal performance and is accurate and complete.                         Claude Willson MD  09/21/19 1800

## 2019-09-21 NOTE — ED NOTES
Pt moving around, dressing self well. Pt denies any dizziness at this time after medication administration.

## 2019-09-24 ENCOUNTER — HOSPITAL ENCOUNTER (EMERGENCY)
Facility: HOSPITAL | Age: 29
Discharge: HOME | End: 2019-09-24
Attending: EMERGENCY MEDICINE

## 2019-09-24 VITALS
TEMPERATURE: 99 F | RESPIRATION RATE: 20 BRPM | DIASTOLIC BLOOD PRESSURE: 87 MMHG | HEIGHT: 72 IN | HEART RATE: 75 BPM | OXYGEN SATURATION: 99 % | SYSTOLIC BLOOD PRESSURE: 187 MMHG | WEIGHT: 293 LBS | BODY MASS INDEX: 39.68 KG/M2

## 2019-09-24 DIAGNOSIS — N20.1 URETEROLITHIASIS: Primary | ICD-10-CM

## 2019-09-24 LAB
ALBUMIN SERPL BCP-MCNC: 3.7 G/DL (ref 3.5–5.2)
ALP SERPL-CCNC: 60 U/L (ref 55–135)
ALT SERPL W/O P-5'-P-CCNC: 14 U/L (ref 10–44)
ANION GAP SERPL CALC-SCNC: 5 MMOL/L (ref 8–16)
AST SERPL-CCNC: 12 U/L (ref 10–40)
B-HCG UR QL: NEGATIVE
BACTERIA #/AREA URNS HPF: ABNORMAL /HPF
BASOPHILS # BLD AUTO: 0.03 K/UL (ref 0–0.2)
BASOPHILS NFR BLD: 0.5 % (ref 0–1.9)
BILIRUB SERPL-MCNC: 0.3 MG/DL (ref 0.1–1)
BILIRUB UR QL STRIP: NEGATIVE
BUN SERPL-MCNC: 12 MG/DL (ref 6–20)
CALCIUM SERPL-MCNC: 8.9 MG/DL (ref 8.7–10.5)
CHLORIDE SERPL-SCNC: 106 MMOL/L (ref 95–110)
CLARITY UR: ABNORMAL
CO2 SERPL-SCNC: 30 MMOL/L (ref 23–29)
COLOR UR: YELLOW
CREAT SERPL-MCNC: 0.8 MG/DL (ref 0.5–1.4)
CTP QC/QA: YES
DIFFERENTIAL METHOD: ABNORMAL
EOSINOPHIL # BLD AUTO: 0 K/UL (ref 0–0.5)
EOSINOPHIL NFR BLD: 0.7 % (ref 0–8)
ERYTHROCYTE [DISTWIDTH] IN BLOOD BY AUTOMATED COUNT: 13.5 % (ref 11.5–14.5)
EST. GFR  (AFRICAN AMERICAN): >60 ML/MIN/1.73 M^2
EST. GFR  (NON AFRICAN AMERICAN): >60 ML/MIN/1.73 M^2
GLUCOSE SERPL-MCNC: 100 MG/DL (ref 70–110)
GLUCOSE UR QL STRIP: NEGATIVE
HCT VFR BLD AUTO: 39.5 % (ref 37–48.5)
HGB BLD-MCNC: 12.5 G/DL (ref 12–16)
HGB UR QL STRIP: ABNORMAL
KETONES UR QL STRIP: NEGATIVE
LACTATE SERPL-SCNC: 0.8 MMOL/L (ref 0.5–2.2)
LEUKOCYTE ESTERASE UR QL STRIP: ABNORMAL
LIPASE SERPL-CCNC: 20 U/L (ref 4–60)
LYMPHOCYTES # BLD AUTO: 2 K/UL (ref 1–4.8)
LYMPHOCYTES NFR BLD: 34.9 % (ref 18–48)
MCH RBC QN AUTO: 26.7 PG (ref 27–31)
MCHC RBC AUTO-ENTMCNC: 31.6 G/DL (ref 32–36)
MCV RBC AUTO: 84 FL (ref 82–98)
MICROSCOPIC COMMENT: ABNORMAL
MONOCYTES # BLD AUTO: 0.6 K/UL (ref 0.3–1)
MONOCYTES NFR BLD: 10 % (ref 4–15)
NEUTROPHILS # BLD AUTO: 3 K/UL (ref 1.8–7.7)
NEUTROPHILS NFR BLD: 53.9 % (ref 38–73)
NITRITE UR QL STRIP: NEGATIVE
PH UR STRIP: 6 [PH] (ref 5–8)
PLATELET # BLD AUTO: 311 K/UL (ref 150–350)
PMV BLD AUTO: 9.3 FL (ref 9.2–12.9)
POTASSIUM SERPL-SCNC: 3.8 MMOL/L (ref 3.5–5.1)
PROT SERPL-MCNC: 7.4 G/DL (ref 6–8.4)
PROT UR QL STRIP: NEGATIVE
RBC # BLD AUTO: 4.69 M/UL (ref 4–5.4)
RBC #/AREA URNS HPF: 20 /HPF (ref 0–4)
SODIUM SERPL-SCNC: 141 MMOL/L (ref 136–145)
SP GR UR STRIP: 1.01 (ref 1–1.03)
SQUAMOUS #/AREA URNS HPF: 8 /HPF
URN SPEC COLLECT METH UR: ABNORMAL
UROBILINOGEN UR STRIP-ACNC: NEGATIVE EU/DL
WBC # BLD AUTO: 5.61 K/UL (ref 3.9–12.7)
WBC #/AREA URNS HPF: 15 /HPF (ref 0–5)

## 2019-09-24 PROCEDURE — 81000 URINALYSIS NONAUTO W/SCOPE: CPT

## 2019-09-24 PROCEDURE — 80053 COMPREHEN METABOLIC PANEL: CPT

## 2019-09-24 PROCEDURE — 96374 THER/PROPH/DIAG INJ IV PUSH: CPT

## 2019-09-24 PROCEDURE — 96375 TX/PRO/DX INJ NEW DRUG ADDON: CPT | Mod: 59

## 2019-09-24 PROCEDURE — 83605 ASSAY OF LACTIC ACID: CPT

## 2019-09-24 PROCEDURE — 63600175 PHARM REV CODE 636 W HCPCS: Performed by: EMERGENCY MEDICINE

## 2019-09-24 PROCEDURE — 85025 COMPLETE CBC W/AUTO DIFF WBC: CPT

## 2019-09-24 PROCEDURE — 99284 EMERGENCY DEPT VISIT MOD MDM: CPT | Mod: 25

## 2019-09-24 PROCEDURE — 87086 URINE CULTURE/COLONY COUNT: CPT

## 2019-09-24 PROCEDURE — 83690 ASSAY OF LIPASE: CPT

## 2019-09-24 PROCEDURE — 25000003 PHARM REV CODE 250: Performed by: EMERGENCY MEDICINE

## 2019-09-24 PROCEDURE — 96361 HYDRATE IV INFUSION ADD-ON: CPT

## 2019-09-24 PROCEDURE — 81025 URINE PREGNANCY TEST: CPT | Performed by: EMERGENCY MEDICINE

## 2019-09-24 RX ORDER — LIDOCAINE 50 MG/G
1 PATCH TOPICAL
Status: DISCONTINUED | OUTPATIENT
Start: 2019-09-24 | End: 2019-09-24 | Stop reason: HOSPADM

## 2019-09-24 RX ORDER — ONDANSETRON 4 MG/1
4 TABLET, ORALLY DISINTEGRATING ORAL EVERY 8 HOURS PRN
Qty: 20 TABLET | Refills: 0 | OUTPATIENT
Start: 2019-09-24 | End: 2019-10-07

## 2019-09-24 RX ORDER — KETOROLAC TROMETHAMINE 30 MG/ML
15 INJECTION, SOLUTION INTRAMUSCULAR; INTRAVENOUS
Status: COMPLETED | OUTPATIENT
Start: 2019-09-24 | End: 2019-09-24

## 2019-09-24 RX ORDER — TAMSULOSIN HYDROCHLORIDE 0.4 MG/1
0.4 CAPSULE ORAL DAILY
Qty: 14 CAPSULE | Refills: 0 | Status: SHIPPED | OUTPATIENT
Start: 2019-09-24 | End: 2019-10-15 | Stop reason: SDUPTHER

## 2019-09-24 RX ORDER — OXYCODONE AND ACETAMINOPHEN 10; 325 MG/1; MG/1
1 TABLET ORAL EVERY 6 HOURS PRN
Qty: 10 TABLET | Refills: 0 | Status: SHIPPED | OUTPATIENT
Start: 2019-09-24 | End: 2019-09-27

## 2019-09-24 RX ORDER — FENTANYL CITRATE 50 UG/ML
50 INJECTION, SOLUTION INTRAMUSCULAR; INTRAVENOUS
Status: COMPLETED | OUTPATIENT
Start: 2019-09-24 | End: 2019-09-24

## 2019-09-24 RX ADMIN — LIDOCAINE 1 PATCH: 50 PATCH TOPICAL at 03:09

## 2019-09-24 RX ADMIN — SODIUM CHLORIDE 1000 ML: 0.9 INJECTION, SOLUTION INTRAVENOUS at 01:09

## 2019-09-24 RX ADMIN — FENTANYL CITRATE 50 MCG: 50 INJECTION INTRAMUSCULAR; INTRAVENOUS at 01:09

## 2019-09-24 RX ADMIN — KETOROLAC TROMETHAMINE 15 MG: 30 INJECTION, SOLUTION INTRAMUSCULAR at 01:09

## 2019-09-24 NOTE — ED PROVIDER NOTES
Encounter Date: 9/24/2019    SCRIBE #1 NOTE: I, Kevin Humphreyleberry, am scribing for, and in the presence of,  Silvestre Mcqueen MD. I have scribed the following portions of the note - Other sections scribed: HPI, ROS, PE.       History     Chief Complaint   Patient presents with    Flank Pain     left sided flank pain x1 week and has progressively worsened. Pt reports taking mediation for kidney stones with no relief.      CC: Flank Pain    HPI: 28 y/o female, with a PMHx of HTN, UTIs, and kidney stones, presents to the ED c/o left-sided flank pain. Pt notes that the flank pain began 1x week ago and notes that is has progressively worsened. Pt states that she is experiencing associated emesis, nausea, and lightheadedness. Pt denies abdominal pain, vaginal bleeding, and vaginal discharge. Pt reports that she was seen in the ED c/o flank pain and hematuria on Saturday. Pt states that the hematuria is resolved, but the flank pain has returned despite being compliant with prescribed medications.    The history is provided by the patient. No  was used.     Review of patient's allergies indicates:   Allergen Reactions    Hydromorphone (bulk) Itching     Past Medical History:   Diagnosis Date    Anxiety     Back problem     Hypertension     IBS (irritable bowel syndrome)     Migraine headache     Obesity      History reviewed. No pertinent surgical history.  Family History   Problem Relation Age of Onset    Heart attack Paternal Grandfather     Cancer Paternal Grandmother 50        Pancreatic cancer    Emphysema Maternal Grandfather     Breast cancer Neg Hx     Colon cancer Neg Hx     Ovarian cancer Neg Hx      Social History     Tobacco Use    Smoking status: Never Smoker    Smokeless tobacco: Never Used   Substance Use Topics    Alcohol use: No     Comment: socially    Drug use: No     Review of Systems   Constitutional: Negative.    HENT: Negative.    Eyes: Negative.     Respiratory: Negative.    Cardiovascular: Negative.    Gastrointestinal: Positive for nausea and vomiting. Negative for abdominal pain.   Genitourinary: Positive for flank pain. Negative for vaginal bleeding and vaginal discharge.   Skin: Negative.    Neurological: Positive for light-headedness.       Physical Exam     Initial Vitals [09/24/19 1205]   BP Pulse Resp Temp SpO2   (!) 163/118 92 20 98.4 °F (36.9 °C) 96 %      MAP       --         Physical Exam    Constitutional: She appears well-developed and well-nourished. She is not diaphoretic. No distress.   HENT:   Head: Normocephalic and atraumatic.   Nose: Nose normal.   Eyes: EOM are normal. Pupils are equal, round, and reactive to light.   Neck: Normal range of motion. Neck supple. No JVD present.   Cardiovascular: Normal rate, regular rhythm and normal heart sounds.   No murmur heard.  Pulmonary/Chest: Breath sounds normal. No stridor. No respiratory distress. She has no wheezes. She has no rales.   Abdominal: Soft. Bowel sounds are normal. She exhibits no distension. There is no tenderness. There is CVA tenderness (left-sided).   Musculoskeletal: Normal range of motion. She exhibits no edema or tenderness.   Neurological: She is alert and oriented to person, place, and time. No cranial nerve deficit.   Skin: Skin is warm and dry. Capillary refill takes less than 2 seconds. No rash noted. No erythema.         ED Course   Procedures  Labs Reviewed   CBC W/ AUTO DIFFERENTIAL - Abnormal; Notable for the following components:       Result Value    Mean Corpuscular Hemoglobin 26.7 (*)     Mean Corpuscular Hemoglobin Conc 31.6 (*)     All other components within normal limits   COMPREHENSIVE METABOLIC PANEL - Abnormal; Notable for the following components:    CO2 30 (*)     Anion Gap 5 (*)     All other components within normal limits   URINALYSIS, REFLEX TO URINE CULTURE - Abnormal; Notable for the following components:    Appearance, UA Hazy (*)     Occult  Blood UA 2+ (*)     Leukocytes, UA 1+ (*)     All other components within normal limits    Narrative:     Preferred Collection Type->Urine, Clean Catch   URINALYSIS MICROSCOPIC - Abnormal; Notable for the following components:    RBC, UA 20 (*)     WBC, UA 15 (*)     Bacteria Few (*)     All other components within normal limits    Narrative:     Preferred Collection Type->Urine, Clean Catch   CULTURE, URINE   LIPASE   LACTIC ACID, PLASMA   POCT URINE PREGNANCY          Imaging Results          US Retroperitoneal Complete (Final result)  Result time 09/24/19 15:07:10   Procedure changed from US Kidney     Final result by Mian Mercado MD (09/24/19 15:07:10)                 Impression:      1. Retroperitoneal/renal sonogram is grossly unremarkable.  No nephrolithiasis or hydronephrosis bilaterally.  Positive bilateral ureteral jets are noted.      Electronically signed by: Mian Mercado  Date:    09/24/2019  Time:    15:07             Narrative:    EXAMINATION:  US RETROPERITONEAL COMPLETE    CLINICAL HISTORY:  left flank pain; known kidney stones from CT 9/21/19;    TECHNIQUE:  Ultrasound of the kidneys and urinary bladder was performed including color flow and Doppler evaluation of the kidneys.    Of note, sonographer reports difficult and technically limited exam secondary to patient body habitus.    COMPARISON:  Abdominal sonogram 12/04/2013    FINDINGS:  Right kidney    Right kidney measures 12.3-cm.  Right kidney is normal in location, size, echogenicity and morphology.  No cortical thinning. No loss of corticomedullary distinction. No sizable suspicious mass. No obstructing nephrolith or hydronephrosis.  Resistive index measures 0.7.    Left kidney    Left kidney measures 12.9-cm.  Left kidney is normal in location, size, echogenicity and morphology.  No cortical thinning. No loss of corticomedullary distinction. No sizable suspicious mass. No obstructing nephrolith or hydronephrosis. Resistive index measures  0.7.    Urinary bladder    The bladder is suboptimally distended at the time of scanning however, appears to be grossly unremarkable in appearance without appreciable mural thickening or focal nodularity.  Bilateral ureteral jets are noted.                                   MDM:    30 y/o female, with a PMHx of HTN, UTIs, and kidney stones, presents to the ED c/o left-sided flank pain. Physical exam remarkable for well appearing female, conversing with ease, abdomen soft, ttp over Left CVA, no rebound/guarding noted.  ED workup remarkable for CBC/CMP wnl, Lipase - 20, LA - 0.8, preg - negative, u/a - contaminated with hematuria present, U/s kidneys - positive bilateral ureteral jets, otherwise unremakrbale.  Pt presentation consistent with previously noted proximal 3mm left ureterolithiasis, with urology f/u next Tuesday.  No further lab abnormality noted, pt with h/o gastric ulcers and bleeding, contraindicated for NSAID administration for further pain control outpatient, therefore additional oxycodone given for the next few days for sparing use in addition to tylenol at home.  Pt refusing stronger medications prior to her drive home  45 mins away.  Pt advised on further care at home, without e/o UTI on u/a with contamination likely culprit of bacteria, with normal WBC and normal vitals.  Pt given scripts for tamsulosin, oxycodone, zofran, and advised on f/u next week, with work note given today as well.  At this time given patient's history, physical exam, and ED workup do not suspect sepsis, septic shock, pyelonephritis, pancreatitis, cholecystitis, ischemic colitis, MI/ACS, PE, PNA, or any further malignant cause.  Discussed diagnosis and further treatment with patient, return precautions given and all questions answered.  Patient in understanding of plan.  Pt discharged to home improved and stable.          Scribe Attestation:   Scribe #1: I performed the above scribed service and the documentation accurately  describes the services I performed. I attest to the accuracy of the note.               Clinical Impression:       ICD-10-CM ICD-9-CM   1. Ureterolithiasis N20.1 592.1                 I, Silvestre Mcqueen M.D., personally performed the services described in this documentation. All medical record entries made by the scribe were at my direction and in my presence.  I have reviewed the chart and agree that the record reflects my personal performance and is accurate and complete.             Silvestre Mcqueen MD  09/24/19 3315

## 2019-09-24 NOTE — ED TRIAGE NOTES
Pt reports to ED with CC of 10/10 flank pain and nausea. Pt reports vomiting and diarrhea starting yesterday PM. Pt reports that she was here on Saturday, was told she had a kidney stone. Pt reports relief of pain yesterday but new onset pain on same side starting this AM around  0700.     Pt denies burning or blood with urination, last urinary output appeared normal this AM.     Pt is AAOx4, able to verbalize and follow commands and ambulate independently.

## 2019-09-26 LAB — BACTERIA UR CULT: NO GROWTH

## 2019-10-02 ENCOUNTER — OFFICE VISIT (OUTPATIENT)
Dept: UROLOGY | Facility: CLINIC | Age: 29
End: 2019-10-02
Payer: COMMERCIAL

## 2019-10-02 VITALS
HEIGHT: 72 IN | WEIGHT: 293 LBS | SYSTOLIC BLOOD PRESSURE: 160 MMHG | BODY MASS INDEX: 39.68 KG/M2 | DIASTOLIC BLOOD PRESSURE: 100 MMHG

## 2019-10-02 DIAGNOSIS — R10.9 LEFT FLANK PAIN: ICD-10-CM

## 2019-10-02 DIAGNOSIS — N13.30 HYDRONEPHROSIS, LEFT: ICD-10-CM

## 2019-10-02 DIAGNOSIS — N20.0 NEPHROLITHIASIS: ICD-10-CM

## 2019-10-02 PROCEDURE — 99999 PR PBB SHADOW E&M-EST. PATIENT-LVL IV: ICD-10-PCS | Mod: PBBFAC,,, | Performed by: NURSE PRACTITIONER

## 2019-10-02 PROCEDURE — 99203 PR OFFICE/OUTPT VISIT, NEW, LEVL III, 30-44 MIN: ICD-10-PCS | Mod: 25,S$GLB,, | Performed by: NURSE PRACTITIONER

## 2019-10-02 PROCEDURE — 99999 PR PBB SHADOW E&M-EST. PATIENT-LVL IV: CPT | Mod: PBBFAC,,, | Performed by: NURSE PRACTITIONER

## 2019-10-02 PROCEDURE — 99203 OFFICE O/P NEW LOW 30 MIN: CPT | Mod: 25,S$GLB,, | Performed by: NURSE PRACTITIONER

## 2019-10-02 PROCEDURE — 81001 POCT URINALYSIS, DIPSTICK OR TABLET REAGENT, AUTOMATED, WITH MICROSCOP: ICD-10-PCS | Mod: S$GLB,,, | Performed by: NURSE PRACTITIONER

## 2019-10-02 PROCEDURE — 81001 URINALYSIS AUTO W/SCOPE: CPT | Mod: S$GLB,,, | Performed by: NURSE PRACTITIONER

## 2019-10-02 RX ORDER — CEFUROXIME AXETIL 250 MG/1
TABLET ORAL
COMMUNITY
End: 2019-12-09

## 2019-10-02 NOTE — PROGRESS NOTES
Subjective:       Patient ID: Elena Valencia is a 29 y.o. female who is a new patient was referred by Claude Willson MD for evaluation of nephrolithiasis    Chief Complaint:   Chief Complaint   Patient presents with    Nephrolithiasis     Pt is here for kidney stones and is here to see whats the next step in her care..and has some questions       Urolithiasis  Patient complains of left flank pain with radiation to the abdomen. Onset of symptoms was abrupt starting a few days ago with gradually improving course since that time. Patient describes the pain as shooting and stabbing, continuous and rated as moderate. The patient has had nausea and vomiting but this has since resolved and no diaphoresis. There has been fever and chills. The patient is not complaining of dysuria or frequency. Risk factors for urolithiasis: cola soft drink intake, family history of stone disease and poor fluid intake.    She initially presented to the ER on 9/21/19 with left flank pain. CT scan showed 3 mm proximal left ureteral stone with mild to moderate left hydronephrosis. She returned to the ER about 3 days later with c/o worsening left flank pain.  RAVI 9/24/19--no hydronephrosis, bilateral ureteral jets visualized.  Flomax, Percocet and Zofran prescribed by the ER.      She is here today for follow up. She is not having any flank pain. She has not required any pain medication in the past 3-4 days. She has not noted passage of kidney stone. Denies dysuria, gross hematuria or fever.  Overall she feels much better.    ACTIVE MEDICAL ISSUES:  Patient Active Problem List   Diagnosis    Hypertension    Migraines    Morbid obesity with BMI of 50.0-59.9, adult    Nephrolithiasis    Left flank pain    Hydronephrosis, left       PAST MEDICAL HISTORY  Past Medical History:   Diagnosis Date    Anxiety     Back problem     Hypertension     IBS (irritable bowel syndrome)     Migraine headache     Obesity        PAST SURGICAL  HISTORY:  History reviewed. No pertinent surgical history.    SOCIAL HISTORY:  Social History     Tobacco Use    Smoking status: Never Smoker    Smokeless tobacco: Never Used   Substance Use Topics    Alcohol use: No     Comment: socially    Drug use: No       FAMILY HISTORY:  Family History   Problem Relation Age of Onset    Heart attack Paternal Grandfather     Cancer Paternal Grandmother 50        Pancreatic cancer    Emphysema Maternal Grandfather     Breast cancer Neg Hx     Colon cancer Neg Hx     Ovarian cancer Neg Hx        ALLERGIES AND MEDICATIONS: updated and reviewed.  Review of patient's allergies indicates:   Allergen Reactions    Hydromorphone (bulk) Itching     Current Outpatient Medications   Medication Sig    dicyclomine (BENTYL) 10 MG capsule Take 10 mg by mouth 3 (three) times daily as needed (IBS).    ketoconazole (NIZORAL) 2 % shampoo USE SHAMPOO ONCE DAILY    LORazepam (ATIVAN) 1 MG tablet Take 1 mg by mouth.    medroxyPROGESTERone (PROVERA) 10 MG tablet Take 10 mg by mouth.    metoprolol succinate (TOPROL-XL) 50 MG 24 hr tablet Take 50 mg by mouth.    NIFEdipine (PROCARDIA XL) 30 MG (OSM) 24 hr tablet Procardia XL 30 mg tablet,extended release   Take 1 tablet twice a day by oral route.    ondansetron (ZOFRAN-ODT) 4 MG TbDL Take 1 tablet (4 mg total) by mouth every 8 (eight) hours as needed.    oxyCODONE-acetaminophen (PERCOCET)  mg per tablet Take 1 tablet by mouth every 4 (four) hours as needed for Pain.    promethazine (PHENERGAN) 25 MG tablet Take 25 mg by mouth every 4 (four) hours as needed for Nausea (IBS).    sumatriptan (IMITREX STATDOSE) 6 mg/0.5 mL kit sumatriptan 6 mg/0.5 mL subcutaneous pen injector    tamsulosin (FLOMAX) 0.4 mg Cap Take 1 capsule (0.4 mg total) by mouth once daily. for 14 days    topiramate (TOPAMAX) 50 MG tablet Take 50 mg by mouth 2 (two) times daily with meals.    valACYclovir (VALTREX) 1000 MG tablet Take 2,000 mg by mouth 2  (two) times daily.     No current facility-administered medications for this visit.        Review of Systems   Constitutional: Negative for activity change, chills, fatigue, fever and unexpected weight change.   Eyes: Negative for discharge, redness and visual disturbance.   Respiratory: Negative for cough, shortness of breath and wheezing.    Cardiovascular: Negative for chest pain and leg swelling.   Gastrointestinal: Negative for abdominal distention, abdominal pain, constipation, diarrhea, nausea and vomiting.   Genitourinary: Negative for decreased urine volume, difficulty urinating, dysuria, flank pain, frequency, hematuria, pelvic pain, urgency and vaginal bleeding.   Musculoskeletal: Negative for arthralgias, joint swelling and myalgias.   Skin: Negative for color change and rash.   Neurological: Negative for dizziness and light-headedness.   Psychiatric/Behavioral: Negative for behavioral problems and confusion. The patient is not nervous/anxious.        Objective:      Vitals:    10/02/19 1530   BP: (!) 160/100   Weight: (!) 181.4 kg (400 lb)   Height: 6' (1.829 m)     Physical Exam   Constitutional: She is oriented to person, place, and time. She appears well-developed.   HENT:   Head: Normocephalic and atraumatic.   Nose: Nose normal.   Eyes: Conjunctivae are normal. Right eye exhibits no discharge. Left eye exhibits no discharge.   Neck: Normal range of motion. Neck supple. No tracheal deviation present. No thyromegaly present.   Cardiovascular: Normal rate and regular rhythm.    Pulmonary/Chest: Effort normal. No respiratory distress. She has no wheezes.   Abdominal: Soft. She exhibits no distension. There is no hepatosplenomegaly. There is no tenderness. There is no CVA tenderness. No hernia.   Genitourinary:   Genitourinary Comments: Patient declined exam   Musculoskeletal: Normal range of motion. She exhibits no edema.   Neurological: She is alert and oriented to person, place, and time.   Skin:  Skin is warm and dry. No rash noted. No erythema.     Psychiatric: She has a normal mood and affect. Her behavior is normal. Judgment normal.       Urine dipstick shows trace protein, 5-10 RBCs.     Narrative     EXAMINATION:  US RETROPERITONEAL COMPLETE    CLINICAL HISTORY:  left flank pain; known kidney stones from CT 9/21/19;    TECHNIQUE:  Ultrasound of the kidneys and urinary bladder was performed including color flow and Doppler evaluation of the kidneys.    Of note, sonographer reports difficult and technically limited exam secondary to patient body habitus.    COMPARISON:  Abdominal sonogram 12/04/2013    FINDINGS:  Right kidney    Right kidney measures 12.3-cm.  Right kidney is normal in location, size, echogenicity and morphology.  No cortical thinning. No loss of corticomedullary distinction. No sizable suspicious mass. No obstructing nephrolith or hydronephrosis.  Resistive index measures 0.7.    Left kidney    Left kidney measures 12.9-cm.  Left kidney is normal in location, size, echogenicity and morphology.  No cortical thinning. No loss of corticomedullary distinction. No sizable suspicious mass. No obstructing nephrolith or hydronephrosis. Resistive index measures 0.7.    Urinary bladder    The bladder is suboptimally distended at the time of scanning however, appears to be grossly unremarkable in appearance without appreciable mural thickening or focal nodularity.  Bilateral ureteral jets are noted.   Impression       1. Retroperitoneal/renal sonogram is grossly unremarkable.  No nephrolithiasis or hydronephrosis bilaterally.  Positive bilateral ureteral jets are noted.      Electronically signed by: Mian Mercado  Date: 09/24/2019  Time: 15:07     Reviewed    Assessment:       1. Nephrolithiasis    2. Left flank pain    3. Hydronephrosis, left          Plan:       1. Nephrolithiasis  -CT scan 9/21/19--3 mm proximal left ureteral stone  -RAVI 9/24/19--no hydronephrosis, no stones  visualized  -Currently without flank pain  -Suspect spontaneous passage of stone  - POCT urinalysis, dipstick or tablet reag    2. Left flank pain  -Resolved  -She will RTC/ER if symptoms return    3. Hydronephrosis, left  -Resolved            Follow up in about 4 weeks (around 10/30/2019) for Follow up.

## 2019-10-02 NOTE — LETTER
October 2, 2019      Claude Willson MD  [address]  [place] [address]           [facility]  [address]  [place] [address]  Phone: [phone]  Fax: [phone]          Patient: Elena Valencia   MR Number: [id number]   YOB: 1990   Date of Visit: 10/2/2019       Dear Dr. Claude Willson:    Thank you for referring Elena Valencia to me for evaluation. Attached you will find relevant portions of my assessment and plan of care.    If you have questions, please do not hesitate to call me. I look forward to following Elena Valencia along with you.    Sincerely,    Cristy Stearns, CECY    Enclosure  CC:  No Recipients    If you would like to receive this communication electronically, please contact [email or web address] or [phone] to request more information on [facility] access.    For providers and/or their staff who would like to refer a patient to [facility], please contact us through our one-stop-shop provider referral line, [facility], at [phone].    If you feel you have received this communication in error or would no longer like to receive these types of communications, please e-mail [email or web address]

## 2019-10-03 LAB
BILIRUB SERPL-MCNC: NORMAL MG/DL
BLOOD URINE, POC: NORMAL
COLOR, POC UA: YELLOW
GLUCOSE UR QL STRIP: NORMAL
KETONES UR QL STRIP: NORMAL
LEUKOCYTE ESTERASE URINE, POC: NORMAL
NITRITE, POC UA: NORMAL
PH, POC UA: 5
PROTEIN, POC: NORMAL
SPECIFIC GRAVITY, POC UA: 1015
UROBILINOGEN, POC UA: NORMAL

## 2019-10-07 ENCOUNTER — TELEPHONE (OUTPATIENT)
Dept: UROLOGY | Facility: CLINIC | Age: 29
End: 2019-10-07

## 2019-10-07 ENCOUNTER — HOSPITAL ENCOUNTER (EMERGENCY)
Facility: HOSPITAL | Age: 29
Discharge: HOME | End: 2019-10-07
Attending: EMERGENCY MEDICINE
Payer: COMMERCIAL

## 2019-10-07 ENCOUNTER — HOSPITAL ENCOUNTER (OUTPATIENT)
Dept: PREADMISSION TESTING | Facility: HOSPITAL | Age: 29
Discharge: HOME | End: 2019-10-07
Attending: UROLOGY
Payer: COMMERCIAL

## 2019-10-07 ENCOUNTER — OFFICE VISIT (OUTPATIENT)
Dept: UROLOGY | Facility: CLINIC | Age: 29
End: 2019-10-07
Payer: COMMERCIAL

## 2019-10-07 VITALS
OXYGEN SATURATION: 99 % | WEIGHT: 293 LBS | RESPIRATION RATE: 18 BRPM | TEMPERATURE: 98 F | DIASTOLIC BLOOD PRESSURE: 72 MMHG | BODY MASS INDEX: 39.68 KG/M2 | HEART RATE: 75 BPM | HEIGHT: 72 IN | SYSTOLIC BLOOD PRESSURE: 124 MMHG

## 2019-10-07 VITALS
DIASTOLIC BLOOD PRESSURE: 86 MMHG | SYSTOLIC BLOOD PRESSURE: 130 MMHG | HEART RATE: 73 BPM | WEIGHT: 293 LBS | RESPIRATION RATE: 16 BRPM | BODY MASS INDEX: 39.68 KG/M2 | HEIGHT: 72 IN

## 2019-10-07 VITALS — WEIGHT: 293 LBS | BODY MASS INDEX: 39.68 KG/M2 | HEIGHT: 72 IN

## 2019-10-07 DIAGNOSIS — Z01.818 PREOP TESTING: Primary | ICD-10-CM

## 2019-10-07 DIAGNOSIS — N20.1 URETEROLITHIASIS: ICD-10-CM

## 2019-10-07 DIAGNOSIS — R10.9 LEFT FLANK PAIN: ICD-10-CM

## 2019-10-07 DIAGNOSIS — N20.1 LEFT URETERAL STONE: Primary | ICD-10-CM

## 2019-10-07 DIAGNOSIS — R10.9 LEFT FLANK PAIN: Primary | ICD-10-CM

## 2019-10-07 DIAGNOSIS — N20.0 KIDNEY STONE: Primary | ICD-10-CM

## 2019-10-07 LAB
ALBUMIN SERPL BCP-MCNC: 4 G/DL (ref 3.5–5.2)
ALP SERPL-CCNC: 58 U/L (ref 55–135)
ALT SERPL W/O P-5'-P-CCNC: 16 U/L (ref 10–44)
ANION GAP SERPL CALC-SCNC: 10 MMOL/L (ref 8–16)
AST SERPL-CCNC: 14 U/L (ref 10–40)
B-HCG UR QL: NEGATIVE
BACTERIA #/AREA URNS HPF: ABNORMAL /HPF
BASOPHILS # BLD AUTO: 0.02 K/UL (ref 0–0.2)
BASOPHILS NFR BLD: 0.3 % (ref 0–1.9)
BILIRUB SERPL-MCNC: 0.4 MG/DL (ref 0.1–1)
BILIRUB UR QL STRIP: NEGATIVE
BUN SERPL-MCNC: 16 MG/DL (ref 6–20)
CALCIUM SERPL-MCNC: 9.2 MG/DL (ref 8.7–10.5)
CHLORIDE SERPL-SCNC: 108 MMOL/L (ref 95–110)
CLARITY UR: ABNORMAL
CO2 SERPL-SCNC: 24 MMOL/L (ref 23–29)
COLOR UR: YELLOW
CREAT SERPL-MCNC: 1.2 MG/DL (ref 0.5–1.4)
DIFFERENTIAL METHOD: ABNORMAL
EOSINOPHIL # BLD AUTO: 0 K/UL (ref 0–0.5)
EOSINOPHIL NFR BLD: 0.7 % (ref 0–8)
ERYTHROCYTE [DISTWIDTH] IN BLOOD BY AUTOMATED COUNT: 13.4 % (ref 11.5–14.5)
EST. GFR  (AFRICAN AMERICAN): >60 ML/MIN/1.73 M^2
EST. GFR  (NON AFRICAN AMERICAN): >60 ML/MIN/1.73 M^2
GLUCOSE SERPL-MCNC: 104 MG/DL (ref 70–110)
GLUCOSE UR QL STRIP: NEGATIVE
HCT VFR BLD AUTO: 40.9 % (ref 37–48.5)
HGB BLD-MCNC: 13 G/DL (ref 12–16)
HGB UR QL STRIP: ABNORMAL
HYALINE CASTS #/AREA URNS LPF: 0 /LPF
KETONES UR QL STRIP: NEGATIVE
LEUKOCYTE ESTERASE UR QL STRIP: NEGATIVE
LYMPHOCYTES # BLD AUTO: 1.4 K/UL (ref 1–4.8)
LYMPHOCYTES NFR BLD: 23.8 % (ref 18–48)
MCH RBC QN AUTO: 27.1 PG (ref 27–31)
MCHC RBC AUTO-ENTMCNC: 31.8 G/DL (ref 32–36)
MCV RBC AUTO: 85 FL (ref 82–98)
MICROSCOPIC COMMENT: ABNORMAL
MONOCYTES # BLD AUTO: 0.7 K/UL (ref 0.3–1)
MONOCYTES NFR BLD: 11.5 % (ref 4–15)
NEUTROPHILS # BLD AUTO: 3.8 K/UL (ref 1.8–7.7)
NEUTROPHILS NFR BLD: 63.9 % (ref 38–73)
NITRITE UR QL STRIP: NEGATIVE
PH UR STRIP: 5 [PH] (ref 5–8)
PLATELET # BLD AUTO: 275 K/UL (ref 150–350)
PMV BLD AUTO: 9.3 FL (ref 9.2–12.9)
POTASSIUM SERPL-SCNC: 3.9 MMOL/L (ref 3.5–5.1)
PROT SERPL-MCNC: 7.4 G/DL (ref 6–8.4)
PROT UR QL STRIP: ABNORMAL
RBC # BLD AUTO: 4.79 M/UL (ref 4–5.4)
RBC #/AREA URNS HPF: 40 /HPF (ref 0–4)
SODIUM SERPL-SCNC: 142 MMOL/L (ref 136–145)
SP GR UR STRIP: 1.03 (ref 1–1.03)
SQUAMOUS #/AREA URNS HPF: 4 /HPF
URN SPEC COLLECT METH UR: ABNORMAL
UROBILINOGEN UR STRIP-ACNC: NEGATIVE EU/DL
WBC # BLD AUTO: 6 K/UL (ref 3.9–12.7)
WBC #/AREA URNS HPF: 9 /HPF (ref 0–5)

## 2019-10-07 PROCEDURE — 3079F DIAST BP 80-89 MM HG: CPT | Mod: CPTII,S$GLB,, | Performed by: UROLOGY

## 2019-10-07 PROCEDURE — 85025 COMPLETE CBC W/AUTO DIFF WBC: CPT

## 2019-10-07 PROCEDURE — 81000 URINALYSIS NONAUTO W/SCOPE: CPT

## 2019-10-07 PROCEDURE — 81025 URINE PREGNANCY TEST: CPT

## 2019-10-07 PROCEDURE — 3079F PR MOST RECENT DIASTOLIC BLOOD PRESSURE 80-89 MM HG: ICD-10-PCS | Mod: CPTII,S$GLB,, | Performed by: UROLOGY

## 2019-10-07 PROCEDURE — 96376 TX/PRO/DX INJ SAME DRUG ADON: CPT

## 2019-10-07 PROCEDURE — 96375 TX/PRO/DX INJ NEW DRUG ADDON: CPT

## 2019-10-07 PROCEDURE — 99999 PR PBB SHADOW E&M-EST. PATIENT-LVL IV: ICD-10-PCS | Mod: PBBFAC,,, | Performed by: UROLOGY

## 2019-10-07 PROCEDURE — 80053 COMPREHEN METABOLIC PANEL: CPT

## 2019-10-07 PROCEDURE — 3008F BODY MASS INDEX DOCD: CPT | Mod: CPTII,S$GLB,, | Performed by: UROLOGY

## 2019-10-07 PROCEDURE — 3075F PR MOST RECENT SYSTOLIC BLOOD PRESS GE 130-139MM HG: ICD-10-PCS | Mod: CPTII,S$GLB,, | Performed by: UROLOGY

## 2019-10-07 PROCEDURE — 96361 HYDRATE IV INFUSION ADD-ON: CPT

## 2019-10-07 PROCEDURE — 96368 THER/DIAG CONCURRENT INF: CPT

## 2019-10-07 PROCEDURE — 99284 EMERGENCY DEPT VISIT MOD MDM: CPT | Mod: 25

## 2019-10-07 PROCEDURE — 3075F SYST BP GE 130 - 139MM HG: CPT | Mod: CPTII,S$GLB,, | Performed by: UROLOGY

## 2019-10-07 PROCEDURE — 96365 THER/PROPH/DIAG IV INF INIT: CPT

## 2019-10-07 PROCEDURE — 99214 OFFICE O/P EST MOD 30 MIN: CPT | Mod: S$GLB,,, | Performed by: UROLOGY

## 2019-10-07 PROCEDURE — 3008F PR BODY MASS INDEX (BMI) DOCUMENTED: ICD-10-PCS | Mod: CPTII,S$GLB,, | Performed by: UROLOGY

## 2019-10-07 PROCEDURE — 99214 PR OFFICE/OUTPT VISIT, EST, LEVL IV, 30-39 MIN: ICD-10-PCS | Mod: S$GLB,,, | Performed by: UROLOGY

## 2019-10-07 PROCEDURE — 99999 PR PBB SHADOW E&M-EST. PATIENT-LVL IV: CPT | Mod: PBBFAC,,, | Performed by: UROLOGY

## 2019-10-07 PROCEDURE — 63600175 PHARM REV CODE 636 W HCPCS: Performed by: EMERGENCY MEDICINE

## 2019-10-07 RX ORDER — OXYCODONE AND ACETAMINOPHEN 10; 325 MG/1; MG/1
1 TABLET ORAL EVERY 4 HOURS PRN
Qty: 18 TABLET | Refills: 0 | Status: SHIPPED | OUTPATIENT
Start: 2019-10-07 | End: 2019-10-15

## 2019-10-07 RX ORDER — KETOROLAC TROMETHAMINE 30 MG/ML
15 INJECTION, SOLUTION INTRAMUSCULAR; INTRAVENOUS
Status: COMPLETED | OUTPATIENT
Start: 2019-10-07 | End: 2019-10-07

## 2019-10-07 RX ORDER — ONDANSETRON 8 MG/1
8 TABLET, ORALLY DISINTEGRATING ORAL EVERY 8 HOURS PRN
Qty: 14 TABLET | Refills: 0 | Status: SHIPPED | OUTPATIENT
Start: 2019-10-07 | End: 2019-12-09

## 2019-10-07 RX ORDER — ONDANSETRON 8 MG/1
8 TABLET, ORALLY DISINTEGRATING ORAL EVERY 8 HOURS PRN
Qty: 14 TABLET | Refills: 0 | Status: SHIPPED | OUTPATIENT
Start: 2019-10-07 | End: 2019-10-07 | Stop reason: SDUPTHER

## 2019-10-07 RX ORDER — ONDANSETRON 2 MG/ML
4 INJECTION INTRAMUSCULAR; INTRAVENOUS
Status: COMPLETED | OUTPATIENT
Start: 2019-10-07 | End: 2019-10-07

## 2019-10-07 RX ORDER — SULFAMETHOXAZOLE AND TRIMETHOPRIM 800; 160 MG/1; MG/1
1 TABLET ORAL 2 TIMES DAILY
Qty: 14 TABLET | Refills: 0 | Status: SHIPPED | OUTPATIENT
Start: 2019-10-07 | End: 2019-10-07 | Stop reason: SDUPTHER

## 2019-10-07 RX ORDER — FENTANYL CITRATE 50 UG/ML
100 INJECTION, SOLUTION INTRAMUSCULAR; INTRAVENOUS
Status: COMPLETED | OUTPATIENT
Start: 2019-10-07 | End: 2019-10-07

## 2019-10-07 RX ORDER — OXYCODONE AND ACETAMINOPHEN 10; 325 MG/1; MG/1
1 TABLET ORAL EVERY 4 HOURS PRN
Qty: 18 TABLET | Refills: 0 | Status: SHIPPED | OUTPATIENT
Start: 2019-10-07 | End: 2019-10-07 | Stop reason: SDUPTHER

## 2019-10-07 RX ORDER — SULFAMETHOXAZOLE AND TRIMETHOPRIM 800; 160 MG/1; MG/1
1 TABLET ORAL 2 TIMES DAILY
Qty: 14 TABLET | Refills: 0 | Status: SHIPPED | OUTPATIENT
Start: 2019-10-07 | End: 2019-10-14

## 2019-10-07 RX ORDER — MORPHINE SULFATE 10 MG/ML
8 INJECTION INTRAMUSCULAR; INTRAVENOUS; SUBCUTANEOUS
Status: COMPLETED | OUTPATIENT
Start: 2019-10-07 | End: 2019-10-07

## 2019-10-07 RX ADMIN — KETOROLAC TROMETHAMINE 15 MG: 30 INJECTION, SOLUTION INTRAMUSCULAR at 08:10

## 2019-10-07 RX ADMIN — SODIUM CHLORIDE 1000 ML: 0.9 INJECTION, SOLUTION INTRAVENOUS at 08:10

## 2019-10-07 RX ADMIN — FENTANYL CITRATE 100 MCG: 50 INJECTION INTRAMUSCULAR; INTRAVENOUS at 11:10

## 2019-10-07 RX ADMIN — MORPHINE SULFATE 8 MG: 10 INJECTION INTRAVENOUS at 08:10

## 2019-10-07 RX ADMIN — PROMETHAZINE HYDROCHLORIDE 25 MG: 25 INJECTION INTRAMUSCULAR; INTRAVENOUS at 12:10

## 2019-10-07 RX ADMIN — KETOROLAC TROMETHAMINE 15 MG: 30 INJECTION, SOLUTION INTRAMUSCULAR at 11:10

## 2019-10-07 RX ADMIN — ONDANSETRON HYDROCHLORIDE 4 MG: 2 SOLUTION INTRAMUSCULAR; INTRAVENOUS at 08:10

## 2019-10-07 RX ADMIN — CEFTRIAXONE 1 G: 1 INJECTION, SOLUTION INTRAVENOUS at 12:10

## 2019-10-07 NOTE — ED TRIAGE NOTES
Pt reports left sided flank pain for 2 weeks. Diagnosed with a kidney stone 2 weeks ago by urologist. Pt. States the pain subsided over the last week but returned this morning. Pt is alert and oriented, ambulatory, respirations even unlabored. Will continue to monitor.

## 2019-10-07 NOTE — TELEPHONE ENCOUNTER
Spoke to pt she went to the er for kidney stones today wanted me to let nette hess know this because states she discuss setting up a procedure for pt. Pt has appt on Monday but wanted to fine out if anything could be done before than. Please advise-ian

## 2019-10-07 NOTE — ED PROVIDER NOTES
Encounter Date: 10/7/2019    SCRIBE #1 NOTE: I, Laura Miranda, am scribing for, and in the presence of,  Claude Willson MD. I have scribed the following portions of the note - Other sections scribed: HPI, ROS and PE.       History     Chief Complaint   Patient presents with    Flank Pain     reports left sided flank pain that began on over x2 weeks ago. c/ n/v     CC: Flank Pain    HPI: This 29 y.o female, with a medical history of hypertension, irritable bowel syndrome, and obesity, presents to the ED c/o left sided flank pain. Pt reports that she was initially seen on 9/21/19, for the same symptoms and was subsequently diagnosed with a 3x mm kidney stone. She states that she was placed on antibiotics (Keflex) and pain medication (percocet) for treatment, but returned to the ED 3x days later as the pain persisted. She notes that she has since seen a urologist who informed her that blood was still present in her urine and advised that she return to the ED if the symptoms worsened. Pt reports that the symptoms worsened at 4:30 am this morning as she was unable to lie down due to the pain. She adds that pain has begun to radiate to the left lower quadrant of the abdomen and is accompanied by nausea and emesis. Symptoms are constant and severe (10/10). She notes taking the previously prescribed Percocet for treatment without any relief. Pt denies fever or any other associated symptoms.     The history is provided by the patient. No  was used.     Review of patient's allergies indicates:   Allergen Reactions    Hydromorphone (bulk) Itching     Past Medical History:   Diagnosis Date    Anxiety     Back problem     Hypertension     IBS (irritable bowel syndrome)     Migraine headache     Obesity     TMJ (temporomandibular joint disorder)      Past Surgical History:   Procedure Laterality Date    CYSTOURETEROSCOPY WITH RETROGRADE PYELOGRAPHY AND INSERTION OF STENT INTO URETER  10/8/2019     Procedure: CYSTOURETEROSCOPY, WITH RETROGRADE PYELOGRAM AND URETERAL STENT INSERTION;  Surgeon: Kristy Ahmadi MD;  Location: [facility] OR;  Service: Urology;;    URETEROSCOPIC REMOVAL OF URETERIC CALCULUS Left 10/8/2019    Procedure: Cystoscopy, possible retrograde pyelogram, ureteroscopy with laser lithotripsy, placement of ureteral stent;  Surgeon: Kristy Ahmadi MD;  Location: [facility] OR;  Service: Urology;  Laterality: Left;  RN PREOP 10/7/2019     Family History   Problem Relation Age of Onset    Heart attack Paternal Grandfather     Cancer Paternal Grandmother 50        Pancreatic cancer    Emphysema Maternal Grandfather     Breast cancer Neg Hx     Colon cancer Neg Hx     Ovarian cancer Neg Hx      Social History     Tobacco Use    Smoking status: Never Smoker    Smokeless tobacco: Never Used   Substance Use Topics    Alcohol use: No     Comment: socially    Drug use: No     Review of Systems   Constitutional: Negative for chills and fever.   HENT: Negative for congestion, rhinorrhea and sore throat.    Eyes: Negative for visual disturbance.   Respiratory: Negative for cough and shortness of breath.    Cardiovascular: Negative for chest pain.   Gastrointestinal: Positive for abdominal pain (left lower quadrant), nausea and vomiting. Negative for diarrhea.   Genitourinary: Positive for flank pain (left). Negative for dysuria and frequency.   Musculoskeletal: Negative for back pain.   Skin: Negative for rash.   Neurological: Negative for dizziness, weakness and headaches.       Physical Exam     Initial Vitals   BP Pulse Resp Temp SpO2   10/07/19 0815 10/07/19 0815 10/07/19 0815 10/07/19 0815 10/07/19 0902   (!) 167/95 (!) 119 (!) 21 99 °F (37.2 °C) 95 %      MAP       --                Physical Exam    Nursing note and vitals reviewed.  Constitutional: She is not diaphoretic. No distress.   There is discomfort secondary to pain.   HENT:   Head: Normocephalic and atraumatic.   Mouth/Throat:  Oropharynx is clear and moist.   Eyes: EOM are normal. Pupils are equal, round, and reactive to light.   Neck: Normal range of motion. Neck supple.   Cardiovascular: Regular rhythm and normal heart sounds.   Patient is tachycardic with a heart rate of 115.   Pulmonary/Chest: Breath sounds normal. No respiratory distress. She has no wheezes. She has no rales.   Abdominal: Soft. Bowel sounds are normal. She exhibits no distension. There is no tenderness.   Musculoskeletal: Normal range of motion. She exhibits no edema or tenderness.   No CVA or back tenderness.   Neurological: She is alert and oriented to person, place, and time.   Skin: Skin is warm and dry.   Psychiatric: She has a normal mood and affect.         ED Course   Procedures  Labs Reviewed   CBC W/ AUTO DIFFERENTIAL - Abnormal; Notable for the following components:       Result Value    Mean Corpuscular Hemoglobin Conc 31.8 (*)     All other components within normal limits   URINALYSIS, REFLEX TO URINE CULTURE - Abnormal; Notable for the following components:    Appearance, UA Hazy (*)     Protein, UA 2+ (*)     Occult Blood UA 3+ (*)     All other components within normal limits    Narrative:     Preferred Collection Type->Urine, Clean Catch   URINALYSIS MICROSCOPIC - Abnormal; Notable for the following components:    RBC, UA 40 (*)     WBC, UA 9 (*)     All other components within normal limits    Narrative:     Preferred Collection Type->Urine, Clean Catch   COMPREHENSIVE METABOLIC PANEL          Imaging Results    None      I have discussed the case with Dr. Ahmadi with Urology.  She states her office will over booked the patient today to plan for procedure tomorrow.  Asked that I give the patient Rocephin and discharged on antibiotics for pending urologic procedure.                           Clinical Impression:       ICD-10-CM ICD-9-CM   1. Left flank pain R10.9 789.09   2. Ureterolithiasis N20.1 592.1            Scribe attestation: Claude LARA  Dario, personally performed the services described in this documentation. All medical record entries made by the scribe were at my direction and in my presence. I have reviewed the chart and agree that the record reflects my personal performance and is accurate and complete.                    Claude Willson MD  10/11/19 1041

## 2019-10-07 NOTE — TELEPHONE ENCOUNTER
I do not see where she had any recent imaging done while in the ER. If this is correct, She will need to have a CT scan done to better evaluate the kidneys    I will place an order for CT scan. Please assist patient with scheduling imaging prior to her appointment on Monday. Thanks

## 2019-10-07 NOTE — TELEPHONE ENCOUNTER
----- Message from Kristy Ahmadi MD sent at 10/7/2019 12:18 PM CDT -----  Pt in ER now. Please have her see someone today (me or Cristy) to potentially arrange for URS tomorrow afternoon or Friday. Okay to overbook. She continues to have pain from her stone.    She does not need repeat imaging at this time.

## 2019-10-07 NOTE — PROGRESS NOTES
Subjective:       Elena Valencia is a 29 y.o. female who is an established patient with Cristy [facility], though new to me was seen for evaluation of nephrolithiasis.      She was seen in ER several times with L flank pain. CT 9/21/19 showed a 3mm L proximal ureteral stone. Follow up [facility] 9/24/19 was clear of hydronephrosis. She presented back to ER today with return of severe flank pain starting today. No fever. Vitals stable. +n/v. Pain was able to be controlled in ER. She would like to consider treatment of stone.     No prior anesthesia problems. HTN, anxiety, migraines, obesity.       The following portions of the patient's history were reviewed and updated as appropriate: allergies, current medications, past family history, past medical history, past social history, past surgical history and problem list.    Review of Systems  Constitutional: no fever or chills  ENT: no nasal congestion or sore throat  Respiratory: no cough or shortness of breath  Cardiovascular: no chest pain or palpitations  Gastrointestinal: no nausea or vomiting, tolerating diet  Genitourinary: as per HPI  Hematologic/Lymphatic: no easy bruising or lymphadenopathy  Musculoskeletal: no arthralgias or myalgias  Skin: no rashes or lesions  Neurological: no seizures or tremors  Behavioral/Psych: no auditory or visual hallucinations        Objective:    Vitals: /86   Pulse 73   Resp 16   Ht 6' (1.829 m)   Wt (!) 181.7 kg (400 lb 9.2 oz)   LMP 09/10/2019 (Approximate)   BMI 54.33 kg/m²     Physical Exam   General: well developed, well nourished, appears uncomfortable  Head: normocephalic, atraumatic  Neck: supple, trachea midline, no obvious enlargement of thyroid  HEENT: EOMI, mucus membranes moist, sclera anicteric, no hearing impairment  Lungs: symmetric expansion, non-labored breathing  Cardiovascular: regular rate and rhythm, normal pulses  Abdomen: soft, non tender, non distended, no palpable masses, no hepatosplenomegaly, no hernias,  no CVA tenderness  Musculoskeletal: no peripheral edema, normal ROM in bilateral upper and lower extremities  Lymphatics: no cervical or inguinal lymphadenopathy  Skin: no rashes or lesions  Neuro: alert and oriented x 3, no gross deficits  Psych: normal judgment and insight, normal mood/affect and non-anxious  Genitourinary:   patient declined exam      Lab Review   Urine analysis today in clinic shows - pending     Lab Results   Component Value Date    WBC 6.00 10/07/2019    HGB 13.0 10/07/2019    HCT 40.9 10/07/2019    MCV 85 10/07/2019     10/07/2019     Lab Results   Component Value Date    CREATININE 1.2 10/07/2019    BUN 16 10/07/2019       Imaging  Images and reports were personally reviewed by me and discussed with patient       Assessment/Plan:      1. Left ureteral stone    - 3mm proximal ureteral stone on CT 9/21/19   - Still with pain. No recent repeat imaging due to multiple recent CT scans.   - Can proceed with cysto/URS/LL/stent. Discussed possibility that no stone found. Discussed stent irritation and expected post-op pain. Possible need for 2nd procedure.   - UCx today   - Received IV abx in ER today   - Discussed red flags to go to ER: fever, inability to tolerate PO, pain not controlled by medications.       2. Left flank pain    - 3mm L prox ureteral stone         Follow up in 2-3 weeks post-op

## 2019-10-07 NOTE — DISCHARGE INSTRUCTIONS
"Your procedure  is scheduled for _10/8/2019_________.    Call [phone] between 2pm and 5pm on _______to find out your arrival time for the day of surgery.    Report to Same Day Surgery Unit at _12:00___ AM on the 2nd floor of the hospital.  Use the front entrance of the hospital.  The front doors of the hospital open promptly at 5:30am.  If you need wheelchair assistance, call [phone] from your cell phone, or call "0" from the courtesy phone in the lobby.    Important instructions:   Do not eat or drink after 12 midnight, including water.  It is okay to brush your teeth.  Do not have gum, candy or mints.     Take only these medications with a small swallow of water on the morning of your surgery __metoprolol, nifedipine, pain pill if needed____________        Stop taking Aspirin, Ibuprofen, Motrin and Aleve , Fish oil, and Vitamin E for at least 7 days before your surgery. You may use Tylenol unless otherwise instructed by your doctor.         Please shower the night before and the morning of your surgery.       Do not wear make- up, including mascara.     You may wear deodorant only.      Do not wear powder, body lotion or perfume/cologne.     Do not wear any jewelry or have any metal on your body.     You will be asked to remove any dentures or partials for the procedure.     Please bring any documents given to you by your doctor.     If you are going home on the same day of surgery, you must arrange for a family member or a friend to drive you home.  Public transportation is prohibited.  You will not be able to drive home if you were given anesthesia or sedation.     Wear loose fitting clothes allowing for bandages.     Please leave money and valuables home.       You may bring your cell phone.     Call the doctor if fever or illness should occur before your surgery.    Call [phone] to contact us here if needed.  "

## 2019-10-08 ENCOUNTER — HOSPITAL ENCOUNTER (OUTPATIENT)
Facility: HOSPITAL | Age: 29
Discharge: HOME | End: 2019-10-08
Attending: UROLOGY | Admitting: UROLOGY
Payer: COMMERCIAL

## 2019-10-08 ENCOUNTER — ANESTHESIA (OUTPATIENT)
Dept: SURGERY | Facility: HOSPITAL | Age: 29
End: 2019-10-08
Payer: COMMERCIAL

## 2019-10-08 ENCOUNTER — ANESTHESIA EVENT (OUTPATIENT)
Dept: SURGERY | Facility: HOSPITAL | Age: 29
End: 2019-10-08
Payer: COMMERCIAL

## 2019-10-08 VITALS
BODY MASS INDEX: 39.68 KG/M2 | DIASTOLIC BLOOD PRESSURE: 76 MMHG | WEIGHT: 293 LBS | HEIGHT: 72 IN | TEMPERATURE: 99 F | OXYGEN SATURATION: 98 % | HEART RATE: 78 BPM | SYSTOLIC BLOOD PRESSURE: 145 MMHG | RESPIRATION RATE: 20 BRPM

## 2019-10-08 DIAGNOSIS — N20.1 LEFT URETERAL STONE: Primary | ICD-10-CM

## 2019-10-08 DIAGNOSIS — R10.9 LEFT FLANK PAIN: ICD-10-CM

## 2019-10-08 PROCEDURE — 88300 SURGICAL PATH GROSS: CPT | Performed by: PATHOLOGY

## 2019-10-08 PROCEDURE — 25000003 PHARM REV CODE 250: Performed by: UROLOGY

## 2019-10-08 PROCEDURE — 63600175 PHARM REV CODE 636 W HCPCS: Performed by: ANESTHESIOLOGY

## 2019-10-08 PROCEDURE — 25000242 PHARM REV CODE 250 ALT 637 W/ HCPCS: Performed by: NURSE ANESTHETIST, CERTIFIED REGISTERED

## 2019-10-08 PROCEDURE — 71000033 HC RECOVERY, INTIAL HOUR: Performed by: UROLOGY

## 2019-10-08 PROCEDURE — 27201423 OPTIME MED/SURG SUP & DEVICES STERILE SUPPLY: Performed by: UROLOGY

## 2019-10-08 PROCEDURE — C1894 INTRO/SHEATH, NON-LASER: HCPCS | Performed by: UROLOGY

## 2019-10-08 PROCEDURE — 74420 PR  X-RAY RETROGRADE PYELOGRAM: ICD-10-PCS | Mod: 26,,, | Performed by: UROLOGY

## 2019-10-08 PROCEDURE — C1769 GUIDE WIRE: HCPCS | Performed by: UROLOGY

## 2019-10-08 PROCEDURE — 25500020 PHARM REV CODE 255: Performed by: UROLOGY

## 2019-10-08 PROCEDURE — 25000003 PHARM REV CODE 250: Performed by: ANESTHESIOLOGY

## 2019-10-08 PROCEDURE — C1758 CATHETER, URETERAL: HCPCS | Performed by: UROLOGY

## 2019-10-08 PROCEDURE — 25000003 PHARM REV CODE 250: Performed by: NURSE ANESTHETIST, CERTIFIED REGISTERED

## 2019-10-08 PROCEDURE — 52352 PR CYSTO/URETERO/PYELOSCOPY, CALCULUS TX: ICD-10-PCS | Mod: LT,,, | Performed by: UROLOGY

## 2019-10-08 PROCEDURE — 52332 CYSTOSCOPY AND TREATMENT: CPT | Mod: 51,LT,, | Performed by: UROLOGY

## 2019-10-08 PROCEDURE — 74420 UROGRAPHY RTRGR +-KUB: CPT | Mod: 26,,, | Performed by: UROLOGY

## 2019-10-08 PROCEDURE — 52332 PR CYSTOSCOPY,INSERT URETERAL STENT: ICD-10-PCS | Mod: 51,LT,, | Performed by: UROLOGY

## 2019-10-08 PROCEDURE — C2617 STENT, NON-COR, TEM W/O DEL: HCPCS | Performed by: UROLOGY

## 2019-10-08 PROCEDURE — 88300 TISSUE SPECIMEN TO PATHOLOGY - SURGERY: ICD-10-PCS | Mod: 26,,, | Performed by: PATHOLOGY

## 2019-10-08 PROCEDURE — D9220A PRA ANESTHESIA: Mod: CRNA,,, | Performed by: NURSE ANESTHETIST, CERTIFIED REGISTERED

## 2019-10-08 PROCEDURE — 82365 CALCULUS SPECTROSCOPY: CPT

## 2019-10-08 PROCEDURE — D9220A PRA ANESTHESIA: ICD-10-PCS | Mod: ANES,,, | Performed by: ANESTHESIOLOGY

## 2019-10-08 PROCEDURE — 52352 CYSTOURETERO W/STONE REMOVE: CPT | Mod: LT,,, | Performed by: UROLOGY

## 2019-10-08 PROCEDURE — 63600175 PHARM REV CODE 636 W HCPCS: Performed by: UROLOGY

## 2019-10-08 PROCEDURE — 36000707: Performed by: UROLOGY

## 2019-10-08 PROCEDURE — 88300 SURGICAL PATH GROSS: CPT | Mod: 26,,, | Performed by: PATHOLOGY

## 2019-10-08 PROCEDURE — D9220A PRA ANESTHESIA: ICD-10-PCS | Mod: CRNA,,, | Performed by: NURSE ANESTHETIST, CERTIFIED REGISTERED

## 2019-10-08 PROCEDURE — C1773 RET DEV, INSERTABLE: HCPCS | Performed by: UROLOGY

## 2019-10-08 PROCEDURE — D9220A PRA ANESTHESIA: Mod: ANES,,, | Performed by: ANESTHESIOLOGY

## 2019-10-08 PROCEDURE — 37000009 HC ANESTHESIA EA ADD 15 MINS: Performed by: UROLOGY

## 2019-10-08 PROCEDURE — 37000008 HC ANESTHESIA 1ST 15 MINUTES: Performed by: UROLOGY

## 2019-10-08 PROCEDURE — 36000706: Performed by: UROLOGY

## 2019-10-08 PROCEDURE — 63600175 PHARM REV CODE 636 W HCPCS: Performed by: NURSE ANESTHETIST, CERTIFIED REGISTERED

## 2019-10-08 PROCEDURE — 71000015 HC POSTOP RECOV 1ST HR: Performed by: UROLOGY

## 2019-10-08 DEVICE — STENT URET PERCUFLEX 6FR 26CM: Type: IMPLANTABLE DEVICE | Site: URETER | Status: FUNCTIONAL

## 2019-10-08 RX ORDER — CEFAZOLIN SODIUM 2 G/50ML
2 SOLUTION INTRAVENOUS
Status: COMPLETED | OUTPATIENT
Start: 2019-10-08 | End: 2019-10-08

## 2019-10-08 RX ORDER — ONDANSETRON 2 MG/ML
INJECTION INTRAMUSCULAR; INTRAVENOUS
Status: DISCONTINUED | OUTPATIENT
Start: 2019-10-08 | End: 2019-10-08

## 2019-10-08 RX ORDER — LIDOCAINE HYDROCHLORIDE 10 MG/ML
1 INJECTION, SOLUTION EPIDURAL; INFILTRATION; INTRACAUDAL; PERINEURAL ONCE
Status: DISCONTINUED | OUTPATIENT
Start: 2019-10-08 | End: 2019-10-08 | Stop reason: HOSPADM

## 2019-10-08 RX ORDER — SODIUM CHLORIDE, SODIUM LACTATE, POTASSIUM CHLORIDE, CALCIUM CHLORIDE 600; 310; 30; 20 MG/100ML; MG/100ML; MG/100ML; MG/100ML
INJECTION, SOLUTION INTRAVENOUS CONTINUOUS
Status: DISCONTINUED | OUTPATIENT
Start: 2019-10-08 | End: 2019-10-08 | Stop reason: HOSPADM

## 2019-10-08 RX ORDER — FENTANYL CITRATE 50 UG/ML
25 INJECTION, SOLUTION INTRAMUSCULAR; INTRAVENOUS EVERY 5 MIN PRN
Status: DISCONTINUED | OUTPATIENT
Start: 2019-10-08 | End: 2019-10-08 | Stop reason: HOSPADM

## 2019-10-08 RX ORDER — LIDOCAINE HCL/PF 100 MG/5ML
SYRINGE (ML) INTRAVENOUS
Status: DISCONTINUED | OUTPATIENT
Start: 2019-10-08 | End: 2019-10-08

## 2019-10-08 RX ORDER — SUCCINYLCHOLINE CHLORIDE 20 MG/ML
INJECTION INTRAMUSCULAR; INTRAVENOUS
Status: DISCONTINUED | OUTPATIENT
Start: 2019-10-08 | End: 2019-10-08

## 2019-10-08 RX ORDER — ALBUTEROL SULFATE 90 UG/1
AEROSOL, METERED RESPIRATORY (INHALATION)
Status: DISCONTINUED | OUTPATIENT
Start: 2019-10-08 | End: 2019-10-08

## 2019-10-08 RX ORDER — PROPOFOL 10 MG/ML
VIAL (ML) INTRAVENOUS
Status: DISCONTINUED | OUTPATIENT
Start: 2019-10-08 | End: 2019-10-08

## 2019-10-08 RX ORDER — HYDROCODONE BITARTRATE AND ACETAMINOPHEN 5; 325 MG/1; MG/1
1 TABLET ORAL EVERY 6 HOURS PRN
Qty: 8 TABLET | Refills: 0 | Status: SHIPPED | OUTPATIENT
Start: 2019-10-08 | End: 2019-10-15

## 2019-10-08 RX ORDER — ACETAMINOPHEN 10 MG/ML
1000 INJECTION, SOLUTION INTRAVENOUS ONCE
Status: DISCONTINUED | OUTPATIENT
Start: 2019-10-08 | End: 2019-10-08

## 2019-10-08 RX ORDER — HYDROCODONE BITARTRATE AND ACETAMINOPHEN 5; 325 MG/1; MG/1
1 TABLET ORAL EVERY 4 HOURS PRN
Status: DISCONTINUED | OUTPATIENT
Start: 2019-10-08 | End: 2019-10-08 | Stop reason: HOSPADM

## 2019-10-08 RX ORDER — SODIUM CHLORIDE 0.9 % (FLUSH) 0.9 %
10 SYRINGE (ML) INJECTION
Status: DISCONTINUED | OUTPATIENT
Start: 2019-10-08 | End: 2019-10-08 | Stop reason: HOSPADM

## 2019-10-08 RX ORDER — ACETAMINOPHEN 500 MG
1000 TABLET ORAL ONCE
Status: COMPLETED | OUTPATIENT
Start: 2019-10-08 | End: 2019-10-08

## 2019-10-08 RX ORDER — ACETAMINOPHEN 325 MG/1
650 TABLET ORAL EVERY 4 HOURS PRN
Status: DISCONTINUED | OUTPATIENT
Start: 2019-10-08 | End: 2019-10-08 | Stop reason: HOSPADM

## 2019-10-08 RX ORDER — MIDAZOLAM HYDROCHLORIDE 1 MG/ML
INJECTION, SOLUTION INTRAMUSCULAR; INTRAVENOUS
Status: DISCONTINUED | OUTPATIENT
Start: 2019-10-08 | End: 2019-10-08

## 2019-10-08 RX ORDER — PHENAZOPYRIDINE HYDROCHLORIDE 100 MG/1
200 TABLET, FILM COATED ORAL 3 TIMES DAILY PRN
Status: DISCONTINUED | OUTPATIENT
Start: 2019-10-08 | End: 2019-10-08 | Stop reason: HOSPADM

## 2019-10-08 RX ORDER — PHENAZOPYRIDINE HYDROCHLORIDE 100 MG/1
200 TABLET, FILM COATED ORAL
Qty: 18 TABLET | Refills: 0 | Status: SHIPPED | OUTPATIENT
Start: 2019-10-08 | End: 2019-10-15

## 2019-10-08 RX ORDER — FENTANYL CITRATE 50 UG/ML
INJECTION, SOLUTION INTRAMUSCULAR; INTRAVENOUS
Status: DISCONTINUED | OUTPATIENT
Start: 2019-10-08 | End: 2019-10-08

## 2019-10-08 RX ORDER — NEOSTIGMINE METHYLSULFATE 1 MG/ML
INJECTION, SOLUTION INTRAVENOUS
Status: DISCONTINUED | OUTPATIENT
Start: 2019-10-08 | End: 2019-10-08

## 2019-10-08 RX ORDER — GLYCOPYRROLATE 0.2 MG/ML
INJECTION INTRAMUSCULAR; INTRAVENOUS
Status: DISCONTINUED | OUTPATIENT
Start: 2019-10-08 | End: 2019-10-08

## 2019-10-08 RX ORDER — ROCURONIUM BROMIDE 10 MG/ML
INJECTION, SOLUTION INTRAVENOUS
Status: DISCONTINUED | OUTPATIENT
Start: 2019-10-08 | End: 2019-10-08

## 2019-10-08 RX ORDER — ONDANSETRON 2 MG/ML
4 INJECTION INTRAMUSCULAR; INTRAVENOUS EVERY 12 HOURS PRN
Status: DISCONTINUED | OUTPATIENT
Start: 2019-10-08 | End: 2019-10-08 | Stop reason: HOSPADM

## 2019-10-08 RX ADMIN — FENTANYL CITRATE 25 MCG: 50 INJECTION INTRAMUSCULAR; INTRAVENOUS at 03:10

## 2019-10-08 RX ADMIN — ONDANSETRON 8 MG: 2 INJECTION, SOLUTION INTRAMUSCULAR; INTRAVENOUS at 02:10

## 2019-10-08 RX ADMIN — ROCURONIUM BROMIDE 10 MG: 10 INJECTION, SOLUTION INTRAVENOUS at 02:10

## 2019-10-08 RX ADMIN — CEFAZOLIN SODIUM 2 G: 2 SOLUTION INTRAVENOUS at 01:10

## 2019-10-08 RX ADMIN — SODIUM CHLORIDE, SODIUM LACTATE, POTASSIUM CHLORIDE, AND CALCIUM CHLORIDE: .6; .31; .03; .02 INJECTION, SOLUTION INTRAVENOUS at 02:10

## 2019-10-08 RX ADMIN — GLYCOPYRROLATE 0.6 MG: 0.2 INJECTION, SOLUTION INTRAMUSCULAR; INTRAVENOUS at 02:10

## 2019-10-08 RX ADMIN — ROCURONIUM BROMIDE 40 MG: 10 INJECTION, SOLUTION INTRAVENOUS at 02:10

## 2019-10-08 RX ADMIN — SUCCINYLCHOLINE CHLORIDE 200 MG: 20 INJECTION, SOLUTION INTRAMUSCULAR; INTRAVENOUS at 02:10

## 2019-10-08 RX ADMIN — PROPOFOL 200 MG: 10 INJECTION, EMULSION INTRAVENOUS at 02:10

## 2019-10-08 RX ADMIN — LIDOCAINE HYDROCHLORIDE 100 MG: 20 INJECTION, SOLUTION INTRAVENOUS at 02:10

## 2019-10-08 RX ADMIN — FENTANYL CITRATE 50 MCG: 50 INJECTION INTRAMUSCULAR; INTRAVENOUS at 02:10

## 2019-10-08 RX ADMIN — ALBUTEROL SULFATE 4 PUFF: 90 AEROSOL, METERED RESPIRATORY (INHALATION) at 03:10

## 2019-10-08 RX ADMIN — FENTANYL CITRATE 25 MCG: 50 INJECTION INTRAMUSCULAR; INTRAVENOUS at 04:10

## 2019-10-08 RX ADMIN — ACETAMINOPHEN 1000 MG: 500 TABLET, FILM COATED ORAL at 12:10

## 2019-10-08 RX ADMIN — PHENAZOPYRIDINE HYDROCHLORIDE 200 MG: 100 TABLET ORAL at 03:10

## 2019-10-08 RX ADMIN — MIDAZOLAM HYDROCHLORIDE 2 MG: 1 INJECTION, SOLUTION INTRAMUSCULAR; INTRAVENOUS at 01:10

## 2019-10-08 RX ADMIN — FENTANYL CITRATE 50 MCG: 50 INJECTION INTRAMUSCULAR; INTRAVENOUS at 01:10

## 2019-10-08 RX ADMIN — NEOSTIGMINE METHYLSULFATE 5 MG: 1 INJECTION INTRAVENOUS at 02:10

## 2019-10-08 RX ADMIN — SODIUM CHLORIDE, SODIUM LACTATE, POTASSIUM CHLORIDE, AND CALCIUM CHLORIDE: .6; .31; .03; .02 INJECTION, SOLUTION INTRAVENOUS at 01:10

## 2019-10-08 NOTE — OP NOTE
[facility]  Surgery Department  Urology Operative Note    SUMMARY     Date of Procedure: 10/8/2019     Surgeon(s) and Role:     * Kristy Ahmadi MD - Primary    Assisting Surgeon: None    Pre-Operative Diagnosis: Left ureteral stone [N20.1]  Left flank pain [R10.9]    Post-Operative Diagnosis: Post-Op Diagnosis Codes:     * Left ureteral stone [N20.1]     * Left flank pain [R10.9]    Procedure: Procedure(s) (LRB):  Cystoscopy, retrograde pyelogram, ureteroscopy with stone extraction, placement of ureteral stent (Left)    Anesthesia: Choice    Indication for Procedure:  29-year-old female who presented to the emergency department with severe left flank pain.  CT scan revealed a 3 mm proximal left ureteral stone.  She was seen in clinic yesterday with recurrence of severe pain.  She has elected undergo ureteroscopy for treatment of her stone.    Description of Procedure: The patient was brought to operating room and placed under general anesthesia. Full time out procedures were performed identifying correct patient, procedure and laterality. Appropriate antibiotics with Ancef were given prior to commencement of surgery. The patient was placed in dorsal lithotomy position and prepped and draped in the usual sterile fashion.    A 22Fr rigid cystoscopy was placed per urethral and passed into the bladder. Cystoscopy did not reveal any abnormality of the bladder.  film was obtained, which did not reveal the location of the stone.  A retrograde pyelogram was then performed on the left.  A 5 Armenian open-ended ureteral catheter was passed into the left ureteral orifice a gentle retrograde pyelogram was performed with full-strength Omnipaque solution.  Again no obvious stone was able to be visualized with the retrograde pyelogram. The left ureteral orifice was identified and a Sensor wire was advanced into the renal pelvis under fluoroscopic guidance without difficulty.  Attempts were made to  pass the ureteral scope without a ureteral access sheath initially which were unsuccessful.  A 2nd wire was placed serve as a safety wire.     A 11/13 Fr ureteral access sheath was then placed over the wire and noted to meet resistance in the distal ureter.  Since access to the ureter was obtained decision made to leave the ureteral access sheath at this point and attempt flexible ureteroscopy past this point.  Flexible ureteroscopy was then passed into the ureteral access sheath to the distal ureter.  There was noted to be some narrowing in difficulty in bypassing this area.  A 2nd wire was then used to help guide the ureteral scope past the area of narrowing.  This was able to be then bypassed without trauma to the ureter.  The ureter scope was passed up to level of the proximal ureter. The stone was identified within the proximal ureter.  The stone was noted to be approximately 3 mm in size and therefore decision made to perform basketing without the need for laser lithotripsy.  The stone was noted be slightly embedded into the wall of the ureter and the proximal portion of the ureter.  The 0 tip Nitinol basket was used to grasp the stone and then removing it in its entirety.  The ureteral scope was replaced back through the ureteral access sheath and up to the proximal ureter there was some difficulty in bypassing the area of where the stone was located.  As there was no other stones noted on the preoperative CT scan, further attempts at pyeloscopy was not performed.    The ureteral access sheath was removed under direct vision with no injuries or additional stones identified. Under fluoroscopic guidance, the guidewire was then exchanged for a 6x26 double-J ureteral stent. Good coils were confirmed within the renal pelvis and the bladder using fluoroscopy and cytoscopy. The string was not removed from the stent prior to placement.  The string was secured to the patient's inner right thigh with a Tegaderm  dressing.  The bladder was then emptied and the cystoscope removed.     While cleaning the patient and removing the surgical drapes, it was noted that the string of the stent was accidentally pulled bringing the stent down past the urethral meatus.  For this reason re-prep and drape was performed.  A Sensor wire was passed through the lumen of the visualized stent and passed up to the level of the kidney as confirmed on fluoroscopy.  The pusher was used to guide the stent back into the appropriate position. Good curls were noted in the proximal distal portion of the stent when the wire was removed.  Again, the Tegaderm was confirmed to be securing the strings in their appropriate position.  The patient was then awakened and transferred to PACU in stable condition.     She will remove the stent at home Monday 10/14/2019 and follow-up with me 2-3 weeks.    Findings:  3 mm left proximal ureteral stone grasped and removed.  Narrow distal ureter noted.  Stent on string placed.    Complications: No    Estimated Blood Loss (EBL): 5 cc    Drains:  6 Citizen of [place] by 26 cm double-J ureteral stent in left ureter on a string           Implants:   Implant Name Type Inv. Item Serial No.  Lot No. LRB No. Used   STENT URET PERCUFLEX 6FR 26CM - [id number]  STENT URET PERCUFLEX 6FR 26CM  Nudge [id number] Left 1       Specimens:   Specimen (12h ago, onward)     Start     Ordered    10/08/19 1426  Specimen to Pathology - Surgery  Once     Comments:  Pre-op Diagnosis: Left ureteral stone [N20.1]Left flank pain [R10.9]Procedure(s):Cystoscopy, possible retrograde pyelogram, ureteroscopy with laser lithotripsy, placement of ureteral stent Number of specimens: 1Name of specimens: Ureteral stone      10/08/19 1449                        Condition: Good    Disposition: PACU - hemodynamically stable.    Attestation: I was present and scrubbed for the entire procedure.    Discharge Note    SUMMARY     Admit Date:  10/8/2019    Discharge Date and Time:  10/08/2019 3:09 PM    Hospital Course (synopsis of major diagnoses, care, treatment, and services provided during the course of the hospital stay): Uncomplicated URS/stone extraction /stent.      Final Diagnosis: Post-Op Diagnosis Codes:     * Left ureteral stone [N20.1]     * Left flank pain [R10.9]    Disposition: Home or Self Care    Follow Up/Patient Instructions:     Medications:  Reconciled Home Medications:      Medication List      START taking these medications    HYDROcodone-acetaminophen 5-325 mg per tablet  Commonly known as:  NORCO  Take 1 tablet by mouth every 6 (six) hours as needed for Pain.     phenazopyridine 100 MG tablet  Commonly known as:  PYRIDIUM  Take 2 tablets (200 mg total) by mouth 3 (three) times daily with meals.        CONTINUE taking these medications    dicyclomine 10 MG capsule  Commonly known as:  BENTYL  Take 10 mg by mouth 3 (three) times daily as needed (IBS).     ketoconazole 2 % shampoo  Commonly known as:  NIZORAL  USE SHAMPOO ONCE DAILY     LORazepam 1 MG tablet  Commonly known as:  ATIVAN  Take 1 mg by mouth.     medroxyPROGESTERone 10 MG tablet  Commonly known as:  PROVERA  Take 10 mg by mouth.     metoprolol succinate 50 MG 24 hr tablet  Commonly known as:  TOPROL-XL  Take 50 mg by mouth.     ondansetron 8 MG Tbdl  Commonly known as:  ZOFRAN-ODT  Take 1 tablet (8 mg total) by mouth every 8 (eight) hours as needed (Nausea).     oxyCODONE-acetaminophen  mg per tablet  Commonly known as:  PERCOCET  Take 1 tablet by mouth every 4 (four) hours as needed for Pain.     PROCARDIA XL 30 MG (OSM) 24 hr tablet  Generic drug:  NIFEdipine  Procardia XL 30 mg tablet,extended release   Take 1 tablet twice a day by oral route.     promethazine 25 MG tablet  Commonly known as:  PHENERGAN  Take 25 mg by mouth every 4 (four) hours as needed for Nausea (IBS).     sulfamethoxazole-trimethoprim 800-160mg 800-160 mg Tab  Commonly known as:  BACTRIM  DS  Take 1 tablet by mouth 2 (two) times daily. for 7 days     sumatriptan 6 mg/0.5 mL kit  Commonly known as:  IMITREX STATDOSE  sumatriptan 6 mg/0.5 mL subcutaneous pen injector     tamsulosin 0.4 mg Cap  Commonly known as:  FLOMAX  Take 1 capsule (0.4 mg total) by mouth once daily. for 14 days     topiramate 50 MG tablet  Commonly known as:  TOPAMAX  Take 50 mg by mouth 2 (two) times daily with meals.     valACYclovir 1000 MG tablet  Commonly known as:  VALTREX  Take 2,000 mg by mouth 2 (two) times daily.          Discharge Procedure Orders   Diet general     Call MD for:  temperature >100.4     Call MD for:  persistent nausea and vomiting     Call MD for:  severe uncontrolled pain     Call MD for:  difficulty breathing, headache or visual disturbances     No dressing needed     Activity as tolerated     Follow-up Information     Kristy Ahmadi MD In 3 weeks.    Specialty:  Urology  Why:  For post-op follow up  Contact information:  [address]  [address] [place] [id number] [phone]

## 2019-10-08 NOTE — TRANSFER OF CARE
Anesthesia Transfer of Care Note    Patient: Elena Valencia    Procedure(s) Performed: Procedure(s) (LRB):  Cystoscopy, possible retrograde pyelogram, ureteroscopy with laser lithotripsy, placement of ureteral stent (Left)    Patient location: PACU    Anesthesia Type: general    Transport from OR: Transported from OR on 100% O2 by closed face mask with adequate spontaneous ventilation    Post pain: adequate analgesia    Post assessment: no apparent anesthetic complications    Post vital signs: stable    Level of consciousness: awake    Nausea/Vomiting: no nausea/vomiting    Complications: none    Transfer of care protocol was followed      Last vitals:   Visit Vitals  BP (!) 166/75 (BP Location: Left arm, Patient Position: Lying)   Pulse 89   Temp 37 °C (98.6 °F) (Oral)   Resp 16   Ht 6' (1.829 m)   Wt (!) 181.7 kg (400 lb 9.2 oz)   LMP 10/08/2019   SpO2 100%   Breastfeeding? No   BMI 54.33 kg/m²

## 2019-10-08 NOTE — ANESTHESIA PREPROCEDURE EVALUATION
10/08/2019    Pre-operative evaluation for Procedure(s) (LRB):  Cystoscopy, possible retrograde pyelogram, ureteroscopy with laser lithotripsy, placement of ureteral stent (Left)    Elena Valencia is a 29 y.o. female     Denies CP/SOB/GERD/MI/CVA/URI symptoms.  METS > 4  NPO > 8    Patient Active Problem List   Diagnosis    Hypertension    Migraines    Morbid obesity with BMI of 50.0-59.9, adult    Nephrolithiasis    Left flank pain    Hydronephrosis, left    Left ureteral stone       Review of patient's allergies indicates:   Allergen Reactions    Hydromorphone (bulk) Itching       No current facility-administered medications on file prior to encounter.      Current Outpatient Medications on File Prior to Encounter   Medication Sig Dispense Refill    dicyclomine (BENTYL) 10 MG capsule Take 10 mg by mouth 3 (three) times daily as needed (IBS).      ketoconazole (NIZORAL) 2 % shampoo USE SHAMPOO ONCE DAILY  2    LORazepam (ATIVAN) 1 MG tablet Take 1 mg by mouth.      medroxyPROGESTERone (PROVERA) 10 MG tablet Take 10 mg by mouth.      metoprolol succinate (TOPROL-XL) 50 MG 24 hr tablet Take 50 mg by mouth.      NIFEdipine (PROCARDIA XL) 30 MG (OSM) 24 hr tablet Procardia XL 30 mg tablet,extended release   Take 1 tablet twice a day by oral route.      ondansetron (ZOFRAN-ODT) 8 MG TbDL Take 1 tablet (8 mg total) by mouth every 8 (eight) hours as needed (Nausea). 14 tablet 0    oxyCODONE-acetaminophen (PERCOCET)  mg per tablet Take 1 tablet by mouth every 4 (four) hours as needed for Pain. 18 tablet 0    promethazine (PHENERGAN) 25 MG tablet Take 25 mg by mouth every 4 (four) hours as needed for Nausea (IBS).      sulfamethoxazole-trimethoprim 800-160mg (BACTRIM DS) 800-160 mg Tab Take 1 tablet by mouth 2 (two) times daily. for 7 days 14 tablet 0    sumatriptan (IMITREX STATDOSE) 6 mg/0.5 mL kit sumatriptan 6 mg/0.5 mL subcutaneous pen injector      tamsulosin (FLOMAX) 0.4 mg Cap Take 1 capsule  (0.4 mg total) by mouth once daily. for 14 days 14 capsule 0    topiramate (TOPAMAX) 50 MG tablet Take 50 mg by mouth 2 (two) times daily with meals.  0    valACYclovir (VALTREX) 1000 MG tablet Take 2,000 mg by mouth 2 (two) times daily.  0       No past surgical history on file.    Social History     Socioeconomic History    Marital status: Single     Spouse name: Not on file    Number of children: Not on file    Years of education: Not on file    Highest education level: Not on file   Occupational History    Not on file   Social Needs    Financial resource strain: Not on file    Food insecurity:     Worry: Not on file     Inability: Not on file    Transportation needs:     Medical: Not on file     Non-medical: Not on file   Tobacco Use    Smoking status: Never Smoker    Smokeless tobacco: Never Used   Substance and Sexual Activity    Alcohol use: No     Comment: socially    Drug use: No    Sexual activity: Yes     Partners: Male     Birth control/protection: Condom   Lifestyle    Physical activity:     Days per week: Not on file     Minutes per session: Not on file    Stress: Not on file   Relationships    Social connections:     Talks on phone: Not on file     Gets together: Not on file     Attends [employer] service: Not on file     Active member of club or organization: Not on file     Attends meetings of clubs or organizations: Not on file     Relationship status: Not on file   Other Topics Concern    Not on file   Social History Narrative    Together since 2010    He drives trucks    She is a          CBC:   Recent Labs     10/07/19  0851   WBC 6.00   RBC 4.79   HGB 13.0   HCT 40.9      MCV 85   MCH 27.1   MCHC 31.8*       CMP:   Recent Labs     10/07/19  0851      K 3.9      CO2 24   BUN 16   CREATININE 1.2      CALCIUM 9.2   ALBUMIN 4.0   PROT 7.4   ALKPHOS 58   ALT 16   AST 14   BILITOT 0.4       INR  No results for input(s): PT, INR, PROTIME, APTT in  the last 72 hours.      There were no vitals filed for this visit.  See nursing charting for additional vital signs      Diagnostic Studies:  Results for HE GOLDSMITH (MRN [id number]) as of 10/8/2019 11:49   Ref. Range 10/7/2019 08:51   WBC Latest Ref Range: 3.90 - 12.70 K/uL 6.00   RBC Latest Ref Range: 4.00 - 5.40 M/uL 4.79   Hemoglobin Latest Ref Range: 12.0 - 16.0 g/dL 13.0   Hematocrit Latest Ref Range: 37.0 - 48.5 % 40.9   MCV Latest Ref Range: 82 - 98 fL 85   MCH Latest Ref Range: 27.0 - 31.0 pg 27.1   MCHC Latest Ref Range: 32.0 - 36.0 g/dL 31.8 (L)   RDW Latest Ref Range: 11.5 - 14.5 % 13.4   Platelets Latest Ref Range: 150 - 350 K/uL 275   MPV Latest Ref Range: 9.2 - 12.9 fL 9.3   Gran% Latest Ref Range: 38.0 - 73.0 % 63.9   Gran # (ANC) Latest Ref Range: 1.8 - 7.7 K/uL 3.8   Lymph% Latest Ref Range: 18.0 - 48.0 % 23.8   Lymph # Latest Ref Range: 1.0 - 4.8 K/uL 1.4   Mono% Latest Ref Range: 4.0 - 15.0 % 11.5   Mono # Latest Ref Range: 0.3 - 1.0 K/uL 0.7   Eosinophil% Latest Ref Range: 0.0 - 8.0 % 0.7   Eos # Latest Ref Range: 0.0 - 0.5 K/uL 0.0   Basophil% Latest Ref Range: 0.0 - 1.9 % 0.3   Baso # Latest Ref Range: 0.00 - 0.20 K/uL 0.02   Differential Method Unknown Automated   Sodium Latest Ref Range: 136 - 145 mmol/L 142   Potassium Latest Ref Range: 3.5 - 5.1 mmol/L 3.9   Chloride Latest Ref Range: 95 - 110 mmol/L 108   CO2 Latest Ref Range: 23 - 29 mmol/L 24   Anion Gap Latest Ref Range: 8 - 16 mmol/L 10   BUN, Bld Latest Ref Range: 6 - 20 mg/dL 16   Creatinine Latest Ref Range: 0.5 - 1.4 mg/dL 1.2   eGFR if non African American Latest Ref Range: >60 mL/min/1.73 m^2 >60   eGFR if  Latest Ref Range: >60 mL/min/1.73 m^2 >60   Glucose Latest Ref Range: 70 - 110 mg/dL 104   Calcium Latest Ref Range: 8.7 - 10.5 mg/dL 9.2   Alkaline Phosphatase Latest Ref Range: 55 - 135 U/L 58   PROTEIN TOTAL Latest Ref Range: 6.0 - 8.4 g/dL 7.4   Albumin Latest Ref Range: 3.5 - 5.2 g/dL 4.0   BILIRUBIN TOTAL  Latest Ref Range: 0.1 - 1.0 mg/dL 0.4   AST Latest Ref Range: 10 - 40 U/L 14   ALT Latest Ref Range: 10 - 44 U/L 16     EKG (5/27/19):  Normal sinus rhythm  Moderate voltage criteria for LVH, may be normal variant    Anesthesia Evaluation    I have reviewed the Patient Summary Reports.    I have reviewed the Nursing Notes.      Review of Systems  Anesthesia Hx:  No problems with previous Anesthesia   Denies Personal Hx of Anesthesia complications.   Social:  Social Alcohol Use, Non-Smoker    Cardiovascular:   Exercise tolerance: good Hypertension ECG has been reviewed.    Pulmonary:  Pulmonary Normal    Renal/:   renal calculi    Hepatic/GI:  Hepatic/GI Normal    Neurological:   Headaches        Physical Exam  General:  Morbid Obesity    Airway/Jaw/Neck:   MP1, TMD >3FB, Teeth intact     Chest/Lungs:  Chest/Lungs Clear    Heart/Vascular:  Heart Findings: Normal            Anesthesia Plan  Type of Anesthesia, risks & benefits discussed:  Anesthesia Type:  general  Patient's Preference:   Intra-op Monitoring Plan: standard ASA monitors  Intra-op Monitoring Plan Comments:   Post Op Pain Control Plan: multimodal analgesia, IV/PO Opioids PRN and per primary service following discharge from PACU  Post Op Pain Control Plan Comments:   Induction:   IV  Beta Blocker:  Patient is not currently on a Beta-Blocker (No further documentation required).       Informed Consent: Patient understands risks and agrees with Anesthesia plan.  Questions answered. Anesthesia consent signed with patient.  ASA Score: 3     Day of Surgery Review of History & Physical:  There are no significant changes.          Ready For Surgery From Anesthesia Perspective.

## 2019-10-08 NOTE — DISCHARGE INSTRUCTIONS
Expect blood and/or burning with urination. Drink plenty of fluid to keep bladder flushed.    **Remove stent by removing tape and pulling string on the morning of Monday 10/14/19. Do not cut string. Expect to see a long, thin tube with a curl on each end attached to the string. Call with issues or if stent does not come out.**        ACTIVITY LEVEL: If you have received sedation or an anesthetic, you may feel sleepy for several hours. Rest until you are more awake. Gradually resume your normal activities.       DIET: You may resume your home diet. If nausea is present, increase your diet gradually with fluids and bland foods.      Medications: Pain medication should be taken only if needed and as directed. If antibiotics are prescribed, the medication should be taken until completed. You will be given an updated list of you medications.  ? No driving, alcoholic beverages or signing legal documents for next 24 hours or while taking pain medication        CALL THE DOCTOR:       · Fever over 101°F  · Severe pain that doesnt go away with medication.  · Upset stomach and vomiting that is persistent.  · Problems urinating-unable to urinate or heavy bleeding (with or without clots)         Fall Prevention  Millions of people fall every year and injure themselves. You may have had anesthesia or sedation which may increase your risk of falling. You may have health issues that put you at an increased risk of falling.     Here are ways to reduce your risk of falling.  ·   · Make your home safe by keeping walkways clear of objects you may trip over.  · Use non-slip pads under rugs. Do not use area rugs or small throw rugs.  · Use non-slip mats in bathtubs and showers.  · Install handrails and lights on staircases.  · Do not walk in poorly lit areas.  · Do not stand on chairs or wobbly ladders.  · Use caution when reaching overhead or looking upward. This position can cause a loss of balance.  · Be sure your shoes fit properly,  have non-slip bottoms and are in good condition.   · Wear shoes both inside and out. Avoid going barefoot or wearing slippers.  · Be cautious when going up and down stairs, curbs, and when walking on uneven sidewalks.  · If your balance is poor, consider using a cane or walker.  · If your fall was related to alcohol use, stop or limit alcohol intake.   · If your fall was related to use of sleeping medicines, talk to your doctor about this. You may need to reduce your dosage at bedtime if you awaken during the night to go to the bathroom.    · To reduce the need for nighttime bathroom trips:  ¨ Avoid drinking fluids for several hours before going to bed  ¨ Empty your bladder before going to bed  ¨ Men can keep a urinal at the bedside  · Stay as active as you can. Balance, flexibility, strength, and endurance all come from exercise. They all play a role in preventing falls. Ask your healthcare provider which types of activity are right for you.  · Get your vision checked on a regular basis.  · If you have pets, know where they are before you stand up or walk so you don't trip over them.  · Use night lights.

## 2019-10-08 NOTE — INTERVAL H&P NOTE
The patient has been examined and the H&P has been reviewed:    I concur with the findings and no changes have occurred since H&P was written. Pain much improved. Will proceed with procedure. She understands she may have passed stone.    Anesthesia/Surgery risks, benefits and alternative options discussed and understood by patient/family.          Active Hospital Problems    Diagnosis  POA    Left ureteral stone [N20.1]  Yes      Resolved Hospital Problems   No resolved problems to display.

## 2019-10-09 NOTE — ANESTHESIA POSTPROCEDURE EVALUATION
Anesthesia Post Evaluation    Patient: Elena Valencia    Procedure(s) Performed: Procedure(s) (LRB):  Cystoscopy, possible retrograde pyelogram, ureteroscopy with laser lithotripsy, placement of ureteral stent (Left)  CYSTOURETEROSCOPY, WITH RETROGRADE PYELOGRAM AND URETERAL STENT INSERTION    Final Anesthesia Type: general  Patient location during evaluation: PACU  Patient participation: Yes- Able to Participate  Level of consciousness: awake and alert and oriented  Post-procedure vital signs: reviewed and stable  Pain management: adequate  Airway patency: patent  PONV status at discharge: No PONV  Anesthetic complications: no      Cardiovascular status: hemodynamically stable and blood pressure returned to baseline  Respiratory status: spontaneous ventilation, room air and unassisted  Hydration status: euvolemic  Follow-up not needed.          Vitals Value Taken Time   /76 10/8/2019  5:01 PM   Temp 37 °C (98.6 °F) 10/8/2019  5:01 PM   Pulse 78 10/8/2019  5:01 PM   Resp 20 10/8/2019  5:01 PM   SpO2 98 % 10/8/2019  5:01 PM         Event Time     Out of Recovery 16:07:00          Pain/Fifi Score: Pain Rating Prior to Med Admin: 7 (10/8/2019  4:00 PM)  Pain Rating Post Med Admin: 0 (10/8/2019  4:10 PM)  Fifi Score: 10 (10/8/2019  4:10 PM)

## 2019-10-10 ENCOUNTER — TELEPHONE (OUTPATIENT)
Dept: UROLOGY | Facility: CLINIC | Age: 29
End: 2019-10-10

## 2019-10-10 NOTE — TELEPHONE ENCOUNTER
If she has partially pulled it out, she needs to just remove the stent. May have some flank pain after stent out but may not note any difference. If unbearable pain or fever, then go to ER but expect that to be unlikely.

## 2019-10-10 NOTE — TELEPHONE ENCOUNTER
----- Message from Gilma JESUS Castro sent at 10/10/2019 12:16 PM CDT -----  Contact: pt      ----- Message -----  From: Neville Preciado  Sent: 10/10/2019  11:25 AM CDT  To: [facility] Staff    Name of Who is Calling: pt    What is the request in detail: states her finger nail got caught up in her stint that was placed and now it is almost out and draining. Pt states she is discomforted and needing to speak with staff. Please contact to further discuss and advise      Can the clinic reply by [facility]:     What Number to Call Back if not in [facility]: [phone]

## 2019-10-10 NOTE — TELEPHONE ENCOUNTER
Spoke to pt she states she already pulled stent out. She states is was hanging half way out so she just pulled completely out. I advised her if she starts with fever,pain or chills she should go to the er for a evaluation. Pt states she is only having slight discomfort that is it. Gilma

## 2019-10-14 LAB
COMPN STONE: NORMAL
SPECIMEN SOURCE: NORMAL
STONE ANALYSIS IR-IMP: NORMAL

## 2019-10-15 ENCOUNTER — OFFICE VISIT (OUTPATIENT)
Dept: UROLOGY | Facility: CLINIC | Age: 29
End: 2019-10-15
Payer: COMMERCIAL

## 2019-10-15 VITALS
WEIGHT: 293 LBS | BODY MASS INDEX: 39.68 KG/M2 | SYSTOLIC BLOOD PRESSURE: 118 MMHG | HEIGHT: 72 IN | DIASTOLIC BLOOD PRESSURE: 78 MMHG

## 2019-10-15 DIAGNOSIS — N13.30 HYDRONEPHROSIS, LEFT: ICD-10-CM

## 2019-10-15 DIAGNOSIS — R10.9 LEFT FLANK PAIN: Primary | ICD-10-CM

## 2019-10-15 DIAGNOSIS — N20.0 NEPHROLITHIASIS: ICD-10-CM

## 2019-10-15 PROCEDURE — 3078F PR MOST RECENT DIASTOLIC BLOOD PRESSURE < 80 MM HG: ICD-10-PCS | Mod: CPTII,S$GLB,, | Performed by: UROLOGY

## 2019-10-15 PROCEDURE — 3008F PR BODY MASS INDEX (BMI) DOCUMENTED: ICD-10-PCS | Mod: CPTII,S$GLB,, | Performed by: UROLOGY

## 2019-10-15 PROCEDURE — 3008F BODY MASS INDEX DOCD: CPT | Mod: CPTII,S$GLB,, | Performed by: UROLOGY

## 2019-10-15 PROCEDURE — 3074F PR MOST RECENT SYSTOLIC BLOOD PRESSURE < 130 MM HG: ICD-10-PCS | Mod: CPTII,S$GLB,, | Performed by: UROLOGY

## 2019-10-15 PROCEDURE — 3078F DIAST BP <80 MM HG: CPT | Mod: CPTII,S$GLB,, | Performed by: UROLOGY

## 2019-10-15 PROCEDURE — 99999 PR PBB SHADOW E&M-EST. PATIENT-LVL III: ICD-10-PCS | Mod: PBBFAC,,, | Performed by: UROLOGY

## 2019-10-15 PROCEDURE — 3074F SYST BP LT 130 MM HG: CPT | Mod: CPTII,S$GLB,, | Performed by: UROLOGY

## 2019-10-15 PROCEDURE — 99214 PR OFFICE/OUTPT VISIT, EST, LEVL IV, 30-39 MIN: ICD-10-PCS | Mod: S$GLB,,, | Performed by: UROLOGY

## 2019-10-15 PROCEDURE — 99999 PR PBB SHADOW E&M-EST. PATIENT-LVL III: CPT | Mod: PBBFAC,,, | Performed by: UROLOGY

## 2019-10-15 PROCEDURE — 99214 OFFICE O/P EST MOD 30 MIN: CPT | Mod: S$GLB,,, | Performed by: UROLOGY

## 2019-10-15 RX ORDER — OXYBUTYNIN CHLORIDE 5 MG/1
5 TABLET ORAL 3 TIMES DAILY PRN
Qty: 30 TABLET | Refills: 0 | Status: SHIPPED | OUTPATIENT
Start: 2019-10-15 | End: 2019-12-09

## 2019-10-15 RX ORDER — TAMSULOSIN HYDROCHLORIDE 0.4 MG/1
0.4 CAPSULE ORAL DAILY
Qty: 14 CAPSULE | Refills: 0 | Status: SHIPPED | OUTPATIENT
Start: 2019-10-15 | End: 2019-12-09

## 2019-10-15 NOTE — PROGRESS NOTES
"  Subjective:       Elena Valencia is a 29 y.o. female who is an established patient with [facility], though new to me was seen for evaluation of nephrolithiasis.      She was seen in ER several times with L flank pain. CT 9/21/19 showed a 3mm L proximal ureteral stone. Follow up R[facility]I 9/24/19 was clear of hydronephrosis. She presented back to ER today with return of severe flank pain starting today. No fever. Vitals stable. +n/v. Pain was able to be controlled in ER. She would like to consider treatment of stone.     No prior anesthesia problems. HTN, anxiety, migraines, obesity.     She is s/p L URS 10/8/19. Ureter was narrow and difficult to reach stone. Stone was completely treated. She was recommended to keep stent 5 days post-op, she accidentally pulled it POD 2. She has had intermittent L flank pain since then, can be severe. Worsened with full bladder. Gross hematuria noted.       The following portions of the patient's history were reviewed and updated as appropriate: allergies, current medications, past family history, past medical history, past social history, past surgical history and problem list.    Review of Systems  Constitutional: no fever or chills  ENT: no nasal congestion or sore throat  Respiratory: no cough or shortness of breath  Cardiovascular: no chest pain or palpitations  Gastrointestinal: no nausea or vomiting, tolerating diet  Genitourinary: as per HPI  Hematologic/Lymphatic: no easy bruising or lymphadenopathy  Musculoskeletal: no arthralgias or myalgias  Skin: no rashes or lesions  Neurological: no seizures or tremors  Behavioral/Psych: no auditory or visual hallucinations        Objective:    Vitals: /78 (BP Location: Right arm, Patient Position: Sitting, BP Method: X-Large (Manual))   Ht 6' 1" (1.854 m)   Wt (!) 181.4 kg (400 lb)   LMP 10/08/2019   BMI 52.77 kg/m²     Physical Exam   General: well developed, well nourished, appears uncomfortable  Head: normocephalic, " atraumatic  Neck: supple, trachea midline, no obvious enlargement of thyroid  HEENT: EOMI, mucus membranes moist, sclera anicteric, no hearing impairment  Lungs: symmetric expansion, non-labored breathing  Cardiovascular: regular rate and rhythm, normal pulses  Abdomen: soft, non tender, non distended, no palpable masses, no hepatosplenomegaly, no hernias, no CVA tenderness  Musculoskeletal: no peripheral edema, normal ROM in bilateral upper and lower extremities  Lymphatics: no cervical or inguinal lymphadenopathy  Skin: no rashes or lesions  Neuro: alert and oriented x 3, no gross deficits  Psych: normal judgment and insight, normal mood/affect and non-anxious  Genitourinary:   patient declined exam      Lab Review   Urine analysis today in clinic shows - trace LE, 250 RBC, 100 glucose     Lab Results   Component Value Date    WBC 6.00 10/07/2019    HGB 13.0 10/07/2019    HCT 40.9 10/07/2019    MCV 85 10/07/2019     10/07/2019     Lab Results   Component Value Date    CREATININE 1.2 10/07/2019    BUN 16 10/07/2019       Imaging  Images and reports were personally reviewed by me and discussed with patient       Assessment/Plan:      1. Left ureteral stone    - 3mm proximal ureteral stone on CT 9/21/19   - Still with pain. No recent repeat imaging due to multiple recent CT scans.   - s/p cysto/URS/LL/stent 10/8/19. Stent on string pulled out early.   - Stone removed   - Still with pain post-op - expected post-op course thus far   - RAVI 2 weeks   - Ditropan PRN and Flomax given       2. Left flank pain    - 3mm L prox ureteral stone - removed   - Persistent post-op         Follow up in 2 weeks with R[facility]I

## 2019-10-29 ENCOUNTER — HOSPITAL ENCOUNTER (OUTPATIENT)
Dept: RADIOLOGY | Facility: HOSPITAL | Age: 29
Discharge: HOME | End: 2019-10-29
Attending: UROLOGY
Payer: COMMERCIAL

## 2019-10-29 DIAGNOSIS — N20.0 NEPHROLITHIASIS: ICD-10-CM

## 2019-10-29 DIAGNOSIS — N13.30 HYDRONEPHROSIS, LEFT: ICD-10-CM

## 2019-10-29 DIAGNOSIS — R10.9 LEFT FLANK PAIN: ICD-10-CM

## 2019-10-29 PROCEDURE — 76770 US EXAM ABDO BACK WALL COMP: CPT | Mod: 26,,, | Performed by: RADIOLOGY

## 2019-10-29 PROCEDURE — 76770 US RETROPERITONEAL COMPLETE: ICD-10-PCS | Mod: 26,,, | Performed by: RADIOLOGY

## 2019-10-29 PROCEDURE — 76770 US EXAM ABDO BACK WALL COMP: CPT | Mod: TC

## 2019-10-30 ENCOUNTER — OFFICE VISIT (OUTPATIENT)
Dept: UROLOGY | Facility: CLINIC | Age: 29
End: 2019-10-30
Payer: COMMERCIAL

## 2019-10-30 VITALS
SYSTOLIC BLOOD PRESSURE: 131 MMHG | HEIGHT: 72 IN | WEIGHT: 293 LBS | BODY MASS INDEX: 39.68 KG/M2 | DIASTOLIC BLOOD PRESSURE: 70 MMHG

## 2019-10-30 DIAGNOSIS — R10.9 LEFT FLANK PAIN: ICD-10-CM

## 2019-10-30 DIAGNOSIS — N20.1 LEFT URETERAL STONE: Primary | ICD-10-CM

## 2019-10-30 DIAGNOSIS — N13.30 HYDRONEPHROSIS, LEFT: ICD-10-CM

## 2019-10-30 PROCEDURE — 3008F BODY MASS INDEX DOCD: CPT | Mod: CPTII,S$GLB,, | Performed by: NURSE PRACTITIONER

## 2019-10-30 PROCEDURE — 3075F SYST BP GE 130 - 139MM HG: CPT | Mod: CPTII,S$GLB,, | Performed by: NURSE PRACTITIONER

## 2019-10-30 PROCEDURE — 99999 PR PBB SHADOW E&M-EST. PATIENT-LVL IV: CPT | Mod: PBBFAC,,, | Performed by: NURSE PRACTITIONER

## 2019-10-30 PROCEDURE — 3078F PR MOST RECENT DIASTOLIC BLOOD PRESSURE < 80 MM HG: ICD-10-PCS | Mod: CPTII,S$GLB,, | Performed by: NURSE PRACTITIONER

## 2019-10-30 PROCEDURE — 99999 PR PBB SHADOW E&M-EST. PATIENT-LVL IV: ICD-10-PCS | Mod: PBBFAC,,, | Performed by: NURSE PRACTITIONER

## 2019-10-30 PROCEDURE — 99214 OFFICE O/P EST MOD 30 MIN: CPT | Mod: 25,S$GLB,, | Performed by: NURSE PRACTITIONER

## 2019-10-30 PROCEDURE — 81001 URINALYSIS AUTO W/SCOPE: CPT | Mod: S$GLB,,, | Performed by: NURSE PRACTITIONER

## 2019-10-30 PROCEDURE — 3008F PR BODY MASS INDEX (BMI) DOCUMENTED: ICD-10-PCS | Mod: CPTII,S$GLB,, | Performed by: NURSE PRACTITIONER

## 2019-10-30 PROCEDURE — 3075F PR MOST RECENT SYSTOLIC BLOOD PRESS GE 130-139MM HG: ICD-10-PCS | Mod: CPTII,S$GLB,, | Performed by: NURSE PRACTITIONER

## 2019-10-30 PROCEDURE — 99214 PR OFFICE/OUTPT VISIT, EST, LEVL IV, 30-39 MIN: ICD-10-PCS | Mod: 25,S$GLB,, | Performed by: NURSE PRACTITIONER

## 2019-10-30 PROCEDURE — 3078F DIAST BP <80 MM HG: CPT | Mod: CPTII,S$GLB,, | Performed by: NURSE PRACTITIONER

## 2019-10-30 PROCEDURE — 81001 PR  URINALYSIS, AUTO, W/SCOPE: ICD-10-PCS | Mod: S$GLB,,, | Performed by: NURSE PRACTITIONER

## 2019-10-30 RX ORDER — METOPROLOL TARTRATE 50 MG/1
TABLET ORAL
Refills: 5 | COMMUNITY
Start: 2019-10-15 | End: 2024-01-19

## 2019-10-30 NOTE — PROGRESS NOTES
Subjective:       Patient ID: Elena Valencia is a 29 y.o. female who was last seen in this office 10/15/2019    Chief Complaint:   Chief Complaint   Patient presents with    Follow-up     Pt. states she had a US done for Dr. Ahmadi.. and she would like to know if she can get those results today as far as the surgery goes she states she is feeling fine        Urolithiasis  She initially presented to the ER on 9/21/19 with left flank pain. CT scan showed 3 mm proximal left ureteral stone with mild to moderate left hydronephrosis. She returned to the ER about 3 days later with c/o worsening left flank pain.  RAVI 9/24/19--no hydronephrosis, bilateral ureteral jets visualized.  Flomax, Percocet and Zofran prescribed by the ER.      She presented to the ER again on 10/7/19 with L flank pain. No fever. Vitals stable. +n/v. Pain was able to be controlled in ER.     She is s/p L URS 10/8/19 by Dr. Ahmadi. Ureter was narrow and difficult to reach stone. Stone was completely treated. She was recommended to keep stent 5 days post-op, she accidentally pulled it POD 2. She has had intermittent L flank pain since then, can be severe. Worsened with full bladder. Gross hematuria noted. Flomax and Ditropan given at previous visit for symptoms    She is back today with a renal ultrasound. She reports resolution of flank pain. Denies dysuria or gross hematuria. Overall she feels well. No new complaints    ACTIVE MEDICAL ISSUES:  Patient Active Problem List   Diagnosis    Hypertension    Migraines    Morbid obesity with BMI of 50.0-59.9, adult    Nephrolithiasis    Left flank pain    Hydronephrosis, left    Left ureteral stone       ALLERGIES AND MEDICATIONS: updated and reviewed.  Review of patient's allergies indicates:   Allergen Reactions    Hydromorphone (bulk) Itching     Current Outpatient Medications   Medication Sig    dicyclomine (BENTYL) 10 MG capsule Take 10 mg by mouth 3 (three) times daily as needed (IBS).     ketoconazole (NIZORAL) 2 % shampoo USE SHAMPOO ONCE DAILY    LORazepam (ATIVAN) 1 MG tablet Take 1 mg by mouth.    medroxyPROGESTERone (PROVERA) 10 MG tablet Take 10 mg by mouth.    metoprolol succinate (TOPROL-XL) 50 MG 24 hr tablet Take 50 mg by mouth.    metoprolol tartrate (LOPRESSOR) 50 MG tablet TAKE ONE TABLET BY MOUTH EVERY EVENING FOR HEART AND/OR FOR BLOOD PRESSURE    NIFEdipine (PROCARDIA XL) 30 MG (OSM) 24 hr tablet Procardia XL 30 mg tablet,extended release   Take 1 tablet twice a day by oral route.    ondansetron (ZOFRAN-ODT) 8 MG TbDL Take 1 tablet (8 mg total) by mouth every 8 (eight) hours as needed (Nausea).    oxybutynin (DITROPAN) 5 MG Tab Take 1 tablet (5 mg total) by mouth 3 (three) times daily as needed (bladder spasms).    promethazine (PHENERGAN) 25 MG tablet Take 25 mg by mouth every 4 (four) hours as needed for Nausea (IBS).    sumatriptan (IMITREX STATDOSE) 6 mg/0.5 mL kit sumatriptan 6 mg/0.5 mL subcutaneous pen injector    topiramate (TOPAMAX) 50 MG tablet Take 50 mg by mouth 2 (two) times daily with meals.    valACYclovir (VALTREX) 1000 MG tablet Take 2,000 mg by mouth 2 (two) times daily.    tamsulosin (FLOMAX) 0.4 mg Cap Take 1 capsule (0.4 mg total) by mouth once daily. for 14 days     No current facility-administered medications for this visit.        Review of Systems   Constitutional: Negative for activity change, chills, fatigue, fever and unexpected weight change.   Eyes: Negative for discharge, redness and visual disturbance.   Respiratory: Negative for cough, shortness of breath and wheezing.    Cardiovascular: Negative for chest pain and leg swelling.   Gastrointestinal: Negative for abdominal distention, abdominal pain, constipation, diarrhea, nausea and vomiting.   Genitourinary: Negative for decreased urine volume, difficulty urinating, dysuria, flank pain, frequency, hematuria, pelvic pain and urgency.   Musculoskeletal: Negative for arthralgias, joint  "swelling and myalgias.   Skin: Negative for color change and rash.   Neurological: Negative for dizziness and light-headedness.   Psychiatric/Behavioral: Negative for behavioral problems and confusion. The patient is not nervous/anxious.        Objective:      Vitals:    10/30/19 1450   Weight: (!) 181.4 kg (400 lb)   Height: 6' 1" (1.854 m)     Physical Exam   Constitutional: She is oriented to person, place, and time. She appears well-developed.   HENT:   Head: Normocephalic and atraumatic.   Nose: Nose normal.   Eyes: Conjunctivae are normal. Right eye exhibits no discharge. Left eye exhibits no discharge.   Neck: Normal range of motion. Neck supple. No tracheal deviation present. No thyromegaly present.   Cardiovascular: Normal rate and regular rhythm.    Pulmonary/Chest: Effort normal. No respiratory distress. She has no wheezes.   Abdominal: Soft. She exhibits no distension. There is no hepatosplenomegaly. There is no tenderness. There is no CVA tenderness. No hernia.   Genitourinary:   Genitourinary Comments: Patient declined exam   Musculoskeletal: Normal range of motion. She exhibits no edema.   Neurological: She is alert and oriented to person, place, and time.   Skin: Skin is warm and dry. No rash noted. No erythema.     Psychiatric: She has a normal mood and affect. Her behavior is normal. Judgment normal.       Urine dipstick shows 5-10 RBCs.      Narrative     EXAMINATION:  US RETROPERITONEAL COMPLETE    CLINICAL HISTORY:  nephrolithiasis s/p ureteroscopy; Unspecified abdominal pain    TECHNIQUE:  Ultrasound of the kidneys and urinary bladder was performed including color flow and Doppler evaluation of the kidneys.    COMPARISON:  09/24/2019    FINDINGS:  The right kidney is normal in length measuring 12.3 cm. The left kidney is large in length measuring 13.6 cm. Segmental arterial resistive indices are within normal limits. Right lower pole cyst noted measuring 1.3 cm.  No hydronephrosis or renal " masses identified.  The bladder is grossly unremarkable.    Post void residual 19 ml   Impression       Small (1.3 cm) right lower pole cyst.  No hydronephrosis, mass, or findings suggestive of renal stones.      Electronically signed by: Kvng Bryant MD  Date: 10/30/2019  Time: 08:27     Reviewed with patient    Assessment:       1. Left ureteral stone    2. Left flank pain    3. Hydronephrosis, left          Plan:       1. Left ureteral stone  -3mm proximal ureteral stone on CT 9/21/19  -s/p cysto/URS/LL/stent 10/8/19. Stent on string pulled out early  -Stone removed  - POCT urinalysis, dipstick or tablet reag    2. Left flank pain  -Resolved    3. Hydronephrosis, left  -Resolved            Follow up in about 3 months (around 1/30/2020) for Follow up.

## 2019-10-31 LAB
BILIRUB SERPL-MCNC: NORMAL MG/DL
BLOOD URINE, POC: NORMAL
COLOR, POC UA: YELLOW
GLUCOSE UR QL STRIP: NORMAL
KETONES UR QL STRIP: NORMAL
LEUKOCYTE ESTERASE URINE, POC: NORMAL
NITRITE, POC UA: NORMAL
PH, POC UA: 6
PROTEIN, POC: NORMAL
SPECIFIC GRAVITY, POC UA: 1025
UROBILINOGEN, POC UA: NORMAL

## 2019-12-09 ENCOUNTER — OFFICE VISIT (OUTPATIENT)
Dept: OBSTETRICS AND GYNECOLOGY | Facility: CLINIC | Age: 29
End: 2019-12-09
Payer: COMMERCIAL

## 2019-12-09 VITALS
SYSTOLIC BLOOD PRESSURE: 158 MMHG | HEIGHT: 61 IN | DIASTOLIC BLOOD PRESSURE: 90 MMHG | BODY MASS INDEX: 55.32 KG/M2 | WEIGHT: 293 LBS

## 2019-12-09 DIAGNOSIS — E28.2 PCOS (POLYCYSTIC OVARIAN SYNDROME): Primary | ICD-10-CM

## 2019-12-09 PROCEDURE — 3080F PR MOST RECENT DIASTOLIC BLOOD PRESSURE >= 90 MM HG: ICD-10-PCS | Mod: CPTII,S$GLB,, | Performed by: OBSTETRICS & GYNECOLOGY

## 2019-12-09 PROCEDURE — 3008F PR BODY MASS INDEX (BMI) DOCUMENTED: ICD-10-PCS | Mod: CPTII,S$GLB,, | Performed by: OBSTETRICS & GYNECOLOGY

## 2019-12-09 PROCEDURE — 3077F SYST BP >= 140 MM HG: CPT | Mod: CPTII,S$GLB,, | Performed by: OBSTETRICS & GYNECOLOGY

## 2019-12-09 PROCEDURE — 3080F DIAST BP >= 90 MM HG: CPT | Mod: CPTII,S$GLB,, | Performed by: OBSTETRICS & GYNECOLOGY

## 2019-12-09 PROCEDURE — 3008F BODY MASS INDEX DOCD: CPT | Mod: CPTII,S$GLB,, | Performed by: OBSTETRICS & GYNECOLOGY

## 2019-12-09 PROCEDURE — 99213 PR OFFICE/OUTPT VISIT, EST, LEVL III, 20-29 MIN: ICD-10-PCS | Mod: S$GLB,,, | Performed by: OBSTETRICS & GYNECOLOGY

## 2019-12-09 PROCEDURE — 99999 PR PBB SHADOW E&M-EST. PATIENT-LVL III: CPT | Mod: PBBFAC,,, | Performed by: OBSTETRICS & GYNECOLOGY

## 2019-12-09 PROCEDURE — 3077F PR MOST RECENT SYSTOLIC BLOOD PRESSURE >= 140 MM HG: ICD-10-PCS | Mod: CPTII,S$GLB,, | Performed by: OBSTETRICS & GYNECOLOGY

## 2019-12-09 PROCEDURE — 99213 OFFICE O/P EST LOW 20 MIN: CPT | Mod: S$GLB,,, | Performed by: OBSTETRICS & GYNECOLOGY

## 2019-12-09 PROCEDURE — 99999 PR PBB SHADOW E&M-EST. PATIENT-LVL III: ICD-10-PCS | Mod: PBBFAC,,, | Performed by: OBSTETRICS & GYNECOLOGY

## 2019-12-09 RX ORDER — LORAZEPAM 0.5 MG/1
0.5 TABLET ORAL 2 TIMES DAILY
Refills: 1 | COMMUNITY
Start: 2019-11-15

## 2019-12-09 NOTE — PATIENT INSTRUCTIONS
Please lose about a pound a week for the next three months  Time your intercourse to coincide with your natural ovulation  Continue with prenatal vitamins  Follow-up with PCP for blood pressure control

## 2019-12-09 NOTE — PROGRESS NOTES
Subjective:       Patient ID: Elena Valencia is a 29 y.o. female.    Chief Complaint:  Consult (Procreative counseling)      History of Present Illness  HPI  Patient comes in today requesting help with getting pregnant  She has been trying to lose weight.  And had lost 18# recently according to her record.    Seen in 2019.  We discussed weight loss prior to getting pregnant as a mean to aid in ovulation induction and to cut risks of pregnancy.  Status post spontaneous  prior to her last visit.      GYN & OB History  Patient's last menstrual period was 2019 (exact date).   Date of Last Pap: 2019    OB History    Para Term  AB Living   1             SAB TAB Ectopic Multiple Live Births                  # Outcome Date GA Lbr Leonardo/2nd Weight Sex Delivery Anes PTL Lv   1               Past Medical History:   Diagnosis Date    Anxiety     Back problem     Hypertension     IBS (irritable bowel syndrome)     Migraine headache     Obesity     TMJ (temporomandibular joint disorder)        Past Surgical History:   Procedure Laterality Date    CYSTOURETEROSCOPY WITH RETROGRADE PYELOGRAPHY AND INSERTION OF STENT INTO URETER  10/8/2019    Procedure: CYSTOURETEROSCOPY, WITH RETROGRADE PYELOGRAM AND URETERAL STENT INSERTION;  Surgeon: Kristy Ahmadi MD;  Location: [facility] OR;  Service: Urology;;    URETEROSCOPIC REMOVAL OF URETERIC CALCULUS Left 10/8/2019    Procedure: Cystoscopy, possible retrograde pyelogram, ureteroscopy with laser lithotripsy, placement of ureteral stent;  Surgeon: Kristy Ahmadi MD;  Location: [facility] OR;  Service: Urology;  Laterality: Left;  RN PREOP 10/7/2019       Family History   Problem Relation Age of Onset    Heart attack Paternal Grandfather     Cancer Paternal Grandmother 50        Pancreatic cancer    Emphysema Maternal Grandfather     Breast cancer Neg Hx     Colon cancer Neg Hx     Ovarian cancer Neg Hx        Social History      Socioeconomic History    Marital status: Single     Spouse name: Not on file    Number of children: Not on file    Years of education: Not on file    Highest education level: Not on file   Occupational History    Not on file   Social Needs    Financial resource strain: Not on file    Food insecurity:     Worry: Not on file     Inability: Not on file    Transportation needs:     Medical: Not on file     Non-medical: Not on file   Tobacco Use    Smoking status: Never Smoker    Smokeless tobacco: Never Used   Substance and Sexual Activity    Alcohol use: No     Comment: socially    Drug use: No    Sexual activity: Yes     Partners: Male     Birth control/protection: Condom   Lifestyle    Physical activity:     Days per week: Not on file     Minutes per session: Not on file    Stress: Not on file   Relationships    Social connections:     Talks on phone: Not on file     Gets together: Not on file     Attends Anabaptist service: Not on file     Active member of club or organization: Not on file     Attends meetings of clubs or organizations: Not on file     Relationship status: Not on file   Other Topics Concern    Not on file   Social History Narrative    Together since 2010    He drives trucks    She is a        Current Outpatient Medications   Medication Sig Dispense Refill    dicyclomine (BENTYL) 10 MG capsule Take 10 mg by mouth 3 (three) times daily as needed (IBS).      ketoconazole (NIZORAL) 2 % shampoo USE SHAMPOO ONCE DAILY  2    LORazepam (ATIVAN) 0.5 MG tablet Take 0.5 mg by mouth 2 (two) times daily.  1    metoprolol tartrate (LOPRESSOR) 50 MG tablet TAKE ONE TABLET BY MOUTH EVERY EVENING FOR HEART AND/OR FOR BLOOD PRESSURE  5    NIFEdipine (PROCARDIA XL) 30 MG (OSM) 24 hr tablet Procardia XL 30 mg tablet,extended release   Take 1 tablet twice a day by oral route.      promethazine (PHENERGAN) 25 MG tablet Take 25 mg by mouth every 4 (four) hours as needed for Nausea (IBS).       topiramate (TOPAMAX) 50 MG tablet Take 50 mg by mouth 2 (two) times daily with meals.  0    valACYclovir (VALTREX) 1000 MG tablet Take 2,000 mg by mouth 2 (two) times daily.  0     No current facility-administered medications for this visit.        Review of patient's allergies indicates:   Allergen Reactions    Hydromorphone (bulk) Itching       Review of Systems  Review of Systems   Constitutional: Negative for activity change, appetite change, chills, fatigue, fever and unexpected weight change.   HENT: Negative for mouth sores.    Respiratory: Negative for cough, shortness of breath and wheezing.    Cardiovascular: Negative for chest pain and palpitations.   Gastrointestinal: Negative for abdominal pain, bloating, blood in stool, constipation, nausea and vomiting.   Endocrine: Negative for diabetes and hot flashes.   Genitourinary: Negative for dysmenorrhea, dyspareunia, dysuria, frequency, hematuria, menorrhagia, menstrual problem, pelvic pain, urgency, vaginal bleeding, vaginal discharge, vaginal pain, urinary incontinence, postcoital bleeding and vaginal odor.   Musculoskeletal: Negative for back pain and myalgias.   Integumentary:  Negative for rash, breast mass and nipple discharge.   Neurological: Negative for seizures and headaches.   Psychiatric/Behavioral: Negative for depression and sleep disturbance. The patient is nervous/anxious.    Breast: Negative for mass, mastodynia and nipple discharge          Objective:    Physical Exam:   Constitutional: She appears well-developed and well-nourished. No distress.    HENT:   Head: Normocephalic and atraumatic.    Eyes: EOM are normal.    Neck: Normal range of motion.    Cardiovascular: Normal rate.     Pulmonary/Chest: Effort normal. No respiratory distress.                  Musculoskeletal: Normal range of motion.       Neurological: She is alert.    Skin: Skin is warm and dry.   Clear acanthosis nigricans at nape of neck and between breasts.     Psychiatric: She has a normal mood and affect.   Somewhat disappointed          Assessment:        1. PCOS (polycystic ovarian syndrome)    2.  Morbidly obese  3.  Metabolic syndrome   4.  Status post spontaneous         Plan:      I have again extensively discussed with the patient regarding her condition  Though we can definitely try her on clomiphene or letrozole, she would be unlikely to get pregnant.  And if she does, her pregnancy would be high-risk for recurrent abortions, fetal demise,  labor, pre-eclampsia, gestational diabetes, small gestational age...  Her blood pressure is higher at this visit than the last visit.  By my record we have in her chart, she only lost a little over 5#.  She thought that she lost 18#.  Further history revealed that she actually gained significant weight in September prior to losing it again this month.  She is taking Adipex from her PCP, despite being already hypertensive.  I suggested that she should try to lose more weight.  5# in 6 months seemed inadequate.  She would like to claim 18# in about 3 weeks, since mid-November when she started on Adipex.  She should continue to lose weight with proper diet and exercise.  Though it is NOT easy to do, weight loss is the only proven management plan for PCOS and metabolic syndrome long-term.  Even if she can not get pregnant, weight loss would be best for her health.  She can continue to take prenatal vitamins and time her intercourse to be coincide with her ovulation in trying to get pregnant.    Back in three months.  Hopefully, she would lose some more weight so we can start ovulation induction at a more reasonable weight.        Face to face time: 25 min

## 2019-12-24 ENCOUNTER — OFFICE VISIT (OUTPATIENT)
Dept: OBSTETRICS AND GYNECOLOGY | Facility: CLINIC | Age: 29
End: 2019-12-24
Payer: COMMERCIAL

## 2019-12-24 VITALS
WEIGHT: 293 LBS | DIASTOLIC BLOOD PRESSURE: 82 MMHG | BODY MASS INDEX: 39.68 KG/M2 | SYSTOLIC BLOOD PRESSURE: 122 MMHG | HEIGHT: 72 IN

## 2019-12-24 DIAGNOSIS — Z01.419 ENCOUNTER FOR GYNECOLOGICAL EXAMINATION WITHOUT ABNORMAL FINDING: Primary | ICD-10-CM

## 2019-12-24 DIAGNOSIS — Z31.41 FERTILITY TESTING: ICD-10-CM

## 2019-12-24 DIAGNOSIS — N76.0 ACUTE VAGINITIS: ICD-10-CM

## 2019-12-24 DIAGNOSIS — E28.2 PCOS (POLYCYSTIC OVARIAN SYNDROME): ICD-10-CM

## 2019-12-24 PROCEDURE — 3079F PR MOST RECENT DIASTOLIC BLOOD PRESSURE 80-89 MM HG: ICD-10-PCS | Mod: CPTII,S$GLB,, | Performed by: OBSTETRICS & GYNECOLOGY

## 2019-12-24 PROCEDURE — 99999 PR PBB SHADOW E&M-EST. PATIENT-LVL III: CPT | Mod: PBBFAC,,, | Performed by: OBSTETRICS & GYNECOLOGY

## 2019-12-24 PROCEDURE — 87481 CANDIDA DNA AMP PROBE: CPT | Mod: 59

## 2019-12-24 PROCEDURE — 99395 PR PREVENTIVE VISIT,EST,18-39: ICD-10-PCS | Mod: S$GLB,,, | Performed by: OBSTETRICS & GYNECOLOGY

## 2019-12-24 PROCEDURE — 99395 PREV VISIT EST AGE 18-39: CPT | Mod: S$GLB,,, | Performed by: OBSTETRICS & GYNECOLOGY

## 2019-12-24 PROCEDURE — 87491 CHLMYD TRACH DNA AMP PROBE: CPT

## 2019-12-24 PROCEDURE — 3074F SYST BP LT 130 MM HG: CPT | Mod: CPTII,S$GLB,, | Performed by: OBSTETRICS & GYNECOLOGY

## 2019-12-24 PROCEDURE — 87801 DETECT AGNT MULT DNA AMPLI: CPT

## 2019-12-24 PROCEDURE — 3079F DIAST BP 80-89 MM HG: CPT | Mod: CPTII,S$GLB,, | Performed by: OBSTETRICS & GYNECOLOGY

## 2019-12-24 PROCEDURE — 99999 PR PBB SHADOW E&M-EST. PATIENT-LVL III: ICD-10-PCS | Mod: PBBFAC,,, | Performed by: OBSTETRICS & GYNECOLOGY

## 2019-12-24 PROCEDURE — 3074F PR MOST RECENT SYSTOLIC BLOOD PRESSURE < 130 MM HG: ICD-10-PCS | Mod: CPTII,S$GLB,, | Performed by: OBSTETRICS & GYNECOLOGY

## 2019-12-27 ENCOUNTER — PATIENT MESSAGE (OUTPATIENT)
Dept: OBSTETRICS AND GYNECOLOGY | Facility: CLINIC | Age: 29
End: 2019-12-27

## 2019-12-27 LAB
C TRACH DNA SPEC QL NAA+PROBE: NOT DETECTED
N GONORRHOEA DNA SPEC QL NAA+PROBE: NOT DETECTED

## 2019-12-28 LAB
BACTERIAL VAGINOSIS DNA: POSITIVE
CANDIDA GLABRATA DNA: NEGATIVE
CANDIDA KRUSEI DNA: NEGATIVE
CANDIDA RRNA VAG QL PROBE: NEGATIVE
T VAGINALIS RRNA GENITAL QL PROBE: NEGATIVE

## 2019-12-30 ENCOUNTER — TELEPHONE (OUTPATIENT)
Dept: OBSTETRICS AND GYNECOLOGY | Facility: CLINIC | Age: 29
End: 2019-12-30

## 2019-12-30 ENCOUNTER — PATIENT MESSAGE (OUTPATIENT)
Dept: OBSTETRICS AND GYNECOLOGY | Facility: CLINIC | Age: 29
End: 2019-12-30

## 2019-12-30 ENCOUNTER — LAB VISIT (OUTPATIENT)
Dept: LAB | Facility: HOSPITAL | Age: 29
End: 2019-12-30
Attending: OBSTETRICS & GYNECOLOGY
Payer: COMMERCIAL

## 2019-12-30 DIAGNOSIS — E28.2 PCOS (POLYCYSTIC OVARIAN SYNDROME): ICD-10-CM

## 2019-12-30 DIAGNOSIS — B96.89 BV (BACTERIAL VAGINOSIS): Primary | ICD-10-CM

## 2019-12-30 DIAGNOSIS — N76.0 BV (BACTERIAL VAGINOSIS): Primary | ICD-10-CM

## 2019-12-30 LAB
DHEA-S SERPL-MCNC: 425.4 UG/DL (ref 95.8–511.7)
FSH SERPL-ACNC: 9.6 MIU/ML
INSULIN COLLECTION INTERVAL: 1
INSULIN SERPL-ACNC: 19.8 UU/ML
LH SERPL-ACNC: 4.2 MIU/ML
PROGEST SERPL-MCNC: 0.2 NG/ML
PROLACTIN SERPL IA-MCNC: 13.5 NG/ML (ref 5.2–26.5)
TSH SERPL DL<=0.005 MIU/L-ACNC: 2.86 UIU/ML (ref 0.4–4)

## 2019-12-30 PROCEDURE — 82672 ASSAY OF ESTROGEN: CPT

## 2019-12-30 PROCEDURE — 83498 ASY HYDROXYPROGESTERONE 17-D: CPT

## 2019-12-30 PROCEDURE — 83002 ASSAY OF GONADOTROPIN (LH): CPT

## 2019-12-30 PROCEDURE — 84403 ASSAY OF TOTAL TESTOSTERONE: CPT

## 2019-12-30 PROCEDURE — 36415 COLL VENOUS BLD VENIPUNCTURE: CPT

## 2019-12-30 PROCEDURE — 84144 ASSAY OF PROGESTERONE: CPT

## 2019-12-30 PROCEDURE — 83001 ASSAY OF GONADOTROPIN (FSH): CPT

## 2019-12-30 PROCEDURE — 83525 ASSAY OF INSULIN: CPT

## 2019-12-30 PROCEDURE — 82627 DEHYDROEPIANDROSTERONE: CPT

## 2019-12-30 PROCEDURE — 84146 ASSAY OF PROLACTIN: CPT

## 2019-12-30 PROCEDURE — 84443 ASSAY THYROID STIM HORMONE: CPT

## 2019-12-30 RX ORDER — METRONIDAZOLE 500 MG/1
500 TABLET ORAL EVERY 12 HOURS
Qty: 28 TABLET | Refills: 0 | Status: SHIPPED | OUTPATIENT
Start: 2019-12-30 | End: 2020-01-06

## 2019-12-30 NOTE — TELEPHONE ENCOUNTER
----- Message from Bruce Sims sent at 12/30/2019 10:41 AM CST -----  Contact: HE GOLDSMITH   Please refill the medication(s) listed below. The patient can be reached at this phone number once it is called into the pharmacy.    Medication #1: MEDICATION FOR BV    Medication #2    Preferred Pharmacy: [facility] [address] [address]

## 2019-12-30 NOTE — TELEPHONE ENCOUNTER
"Returned call, pt states that she is having prescription sent to her pharmacy of choice, does not need anything further from us at this time. Offered to update pt's pharmacy on file, pt denies need at this time.    ----- Message from Linnea Crowley sent at 12/30/2019  9:45 AM CST -----  Contact: HE GOLDSMITH [[id number]]  Name of Who is Calling: HE GOLDSMITH [[id number]]      What is the request in detail:    Patient called requesting her prescribed medication for BV. Patient states, "she's at the Pharmacy waiting."     Thanks!      Reply by [facility]: no      Call Back : HE GOLDSMITH / # [phone]                                        "

## 2019-12-31 DIAGNOSIS — N76.1 CHRONIC VAGINITIS: Primary | ICD-10-CM

## 2019-12-31 LAB
17OHP SERPL-MCNC: 184 NG/DL (ref 35–413)
ESTROGEN SERPL-MCNC: 163 PG/ML

## 2019-12-31 RX ORDER — METRONIDAZOLE 500 MG/1
500 TABLET ORAL EVERY 12 HOURS
Qty: 14 TABLET | Refills: 1 | Status: SHIPPED | OUTPATIENT
Start: 2019-12-31 | End: 2020-01-14

## 2020-01-03 LAB
ALBUMIN SERPL-MCNC: 4.2 G/DL (ref 3.6–5.1)
SHBG SERPL-SCNC: 29 NMOL/L (ref 17–124)
TESTOST FREE SERPL-MCNC: 3.9 PG/ML (ref 0.2–5)
TESTOST SERPL-MCNC: 30 NG/DL (ref 2–45)
TESTOSTERONE.FREE+WB SERPL-MCNC: 7.4 NG/DL (ref 0.5–8.5)

## 2020-06-11 ENCOUNTER — OFFICE VISIT (OUTPATIENT)
Dept: OBSTETRICS AND GYNECOLOGY | Facility: CLINIC | Age: 30
End: 2020-06-11
Payer: COMMERCIAL

## 2020-06-11 VITALS
SYSTOLIC BLOOD PRESSURE: 120 MMHG | TEMPERATURE: 98 F | DIASTOLIC BLOOD PRESSURE: 74 MMHG | BODY MASS INDEX: 39.68 KG/M2 | WEIGHT: 293 LBS | HEIGHT: 72 IN

## 2020-06-11 DIAGNOSIS — N76.0 BACTERIAL VAGINOSIS: ICD-10-CM

## 2020-06-11 DIAGNOSIS — B96.89 BACTERIAL VAGINOSIS: ICD-10-CM

## 2020-06-11 DIAGNOSIS — E28.2 PCOS (POLYCYSTIC OVARIAN SYNDROME): ICD-10-CM

## 2020-06-11 DIAGNOSIS — Z01.419 WELL WOMAN EXAM WITH ROUTINE GYNECOLOGICAL EXAM: Primary | ICD-10-CM

## 2020-06-11 DIAGNOSIS — E66.01 CLASS 3 SEVERE OBESITY WITH BODY MASS INDEX (BMI) OF 45.0 TO 49.9 IN ADULT, UNSPECIFIED OBESITY TYPE, UNSPECIFIED WHETHER SERIOUS COMORBIDITY PRESENT: ICD-10-CM

## 2020-06-11 DIAGNOSIS — N89.8 VAGINAL DISCHARGE: ICD-10-CM

## 2020-06-11 PROCEDURE — 3074F PR MOST RECENT SYSTOLIC BLOOD PRESSURE < 130 MM HG: ICD-10-PCS | Mod: CPTII,S$GLB,, | Performed by: OBSTETRICS & GYNECOLOGY

## 2020-06-11 PROCEDURE — 3074F SYST BP LT 130 MM HG: CPT | Mod: CPTII,S$GLB,, | Performed by: OBSTETRICS & GYNECOLOGY

## 2020-06-11 PROCEDURE — 99395 PREV VISIT EST AGE 18-39: CPT | Mod: S$GLB,,, | Performed by: OBSTETRICS & GYNECOLOGY

## 2020-06-11 PROCEDURE — 3078F DIAST BP <80 MM HG: CPT | Mod: CPTII,S$GLB,, | Performed by: OBSTETRICS & GYNECOLOGY

## 2020-06-11 PROCEDURE — 87491 CHLMYD TRACH DNA AMP PROBE: CPT

## 2020-06-11 PROCEDURE — 99395 PR PREVENTIVE VISIT,EST,18-39: ICD-10-PCS | Mod: S$GLB,,, | Performed by: OBSTETRICS & GYNECOLOGY

## 2020-06-11 PROCEDURE — 99999 PR PBB SHADOW E&M-EST. PATIENT-LVL III: ICD-10-PCS | Mod: PBBFAC,,, | Performed by: OBSTETRICS & GYNECOLOGY

## 2020-06-11 PROCEDURE — 3078F PR MOST RECENT DIASTOLIC BLOOD PRESSURE < 80 MM HG: ICD-10-PCS | Mod: CPTII,S$GLB,, | Performed by: OBSTETRICS & GYNECOLOGY

## 2020-06-11 PROCEDURE — 87481 CANDIDA DNA AMP PROBE: CPT | Mod: 59

## 2020-06-11 PROCEDURE — 99999 PR PBB SHADOW E&M-EST. PATIENT-LVL III: CPT | Mod: PBBFAC,,, | Performed by: OBSTETRICS & GYNECOLOGY

## 2020-06-11 RX ORDER — ZOLPIDEM TARTRATE 10 MG/1
10 TABLET ORAL NIGHTLY
COMMUNITY
Start: 2020-05-16

## 2020-06-11 RX ORDER — HYDROCHLOROTHIAZIDE 12.5 MG/1
TABLET ORAL
COMMUNITY
Start: 2020-05-16 | End: 2024-01-19

## 2020-06-11 RX ORDER — PHENTERMINE HYDROCHLORIDE 37.5 MG/1
37.5 TABLET ORAL EVERY MORNING
COMMUNITY
Start: 2020-05-30 | End: 2024-01-19

## 2020-06-11 RX ORDER — BUTALBITAL, ACETAMINOPHEN AND CAFFEINE 50; 325; 40 MG/1; MG/1; MG/1
TABLET ORAL
COMMUNITY
Start: 2020-05-04 | End: 2022-11-01

## 2020-06-11 RX ORDER — MUPIROCIN 20 MG/G
OINTMENT TOPICAL 2 TIMES DAILY
COMMUNITY
Start: 2020-05-04 | End: 2022-11-01

## 2020-06-11 RX ORDER — METRONIDAZOLE 500 MG/1
500 TABLET ORAL EVERY 12 HOURS
Qty: 14 TABLET | Refills: 0 | Status: SHIPPED | OUTPATIENT
Start: 2020-06-11 | End: 2022-11-01

## 2020-06-11 NOTE — PROGRESS NOTES
Subjective:       Patient ID: Elena Valencia is a 29 y.o. female.    Chief Complaint:  Gynecologic Exam (Last normal pap was 19)      History of Present Illness  HPI  Annual Exam-Premenopausal  Patient presents for annual exam. The patient has no complaints today except for having vaginal discharge again.. The patient is sexually active. GYN screening history: last pap: approximate date 2019 and was normal. The patient wears seatbelts: yes. The patient participates in regular exercise: yes. Has the patient ever been transfused or tattooed?: yes. The patient reports that there is not domestic violence in her life.    Has been trying to lose weight to try to get pregnant.    Has lost over 30 lbs since last year.        GYN & OB History  Patient's last menstrual period was 2020 (exact date).   Date of Last Pap: 2019    OB History    Para Term  AB Living   1             SAB TAB Ectopic Multiple Live Births                  # Outcome Date GA Lbr Leonardo/2nd Weight Sex Delivery Anes PTL Lv   1               Past Medical History:   Diagnosis Date    Anxiety     Back problem     Hypertension     IBS (irritable bowel syndrome)     Migraine headache     Obesity     TMJ (temporomandibular joint disorder)        Past Surgical History:   Procedure Laterality Date    CYSTOURETEROSCOPY WITH RETROGRADE PYELOGRAPHY AND INSERTION OF STENT INTO URETER  10/8/2019    Procedure: CYSTOURETEROSCOPY, WITH RETROGRADE PYELOGRAM AND URETERAL STENT INSERTION;  Surgeon: Kristy Ahmadi MD;  Location: [facility] OR;  Service: Urology;;    URETEROSCOPIC REMOVAL OF URETERIC CALCULUS Left 10/8/2019    Procedure: Cystoscopy, possible retrograde pyelogram, ureteroscopy with laser lithotripsy, placement of ureteral stent;  Surgeon: Kristy Ahmadi MD;  Location: [facility] OR;  Service: Urology;  Laterality: Left;  RN PREOP 10/7/2019       Family History   Problem Relation Age of Onset    Heart attack  Paternal Grandfather     Cancer Paternal Grandmother 50        Pancreatic cancer    Emphysema Maternal Grandfather     Breast cancer Neg Hx     Colon cancer Neg Hx     Ovarian cancer Neg Hx        Social History     Socioeconomic History    Marital status: Single     Spouse name: Not on file    Number of children: Not on file    Years of education: Not on file    Highest education level: Not on file   Occupational History    Not on file   Social Needs    Financial resource strain: Not on file    Food insecurity:     Worry: Not on file     Inability: Not on file    Transportation needs:     Medical: Not on file     Non-medical: Not on file   Tobacco Use    Smoking status: Never Smoker    Smokeless tobacco: Never Used   Substance and Sexual Activity    Alcohol use: No     Comment: socially    Drug use: No    Sexual activity: Yes     Partners: Male     Birth control/protection: Condom   Lifestyle    Physical activity:     Days per week: Not on file     Minutes per session: Not on file    Stress: Not on file   Relationships    Social connections:     Talks on phone: Not on file     Gets together: Not on file     Attends [employer] service: Not on file     Active member of club or organization: Not on file     Attends meetings of clubs or organizations: Not on file     Relationship status: Not on file   Other Topics Concern    Not on file   Social History Narrative    Together since 2010    He drives trucks    She is a        Current Outpatient Medications   Medication Sig Dispense Refill    dicyclomine (BENTYL) 10 MG capsule Take 10 mg by mouth 3 (three) times daily as needed (IBS).      hydroCHLOROthiazide (HYDRODIURIL) 12.5 MG Tab TAKE ONE TABLET BY MOUTH ONCE DAILY FOR FLUID and/or FOR BLOOD PRESSURE.      LORazepam (ATIVAN) 0.5 MG tablet Take 0.5 mg by mouth 2 (two) times daily.  1    metoprolol tartrate (LOPRESSOR) 50 MG tablet TAKE ONE TABLET BY MOUTH EVERY EVENING FOR HEART  AND/OR FOR BLOOD PRESSURE  5    mupirocin (BACTROBAN) 2 % ointment 2 (two) times daily.      phentermine (ADIPEX-P) 37.5 mg tablet Take 37.5 mg by mouth every morning.      promethazine (PHENERGAN) 25 MG tablet Take 25 mg by mouth every 4 (four) hours as needed for Nausea (IBS).      topiramate (TOPAMAX) 50 MG tablet Take 50 mg by mouth 2 (two) times daily with meals.  0    valACYclovir (VALTREX) 1000 MG tablet Take 2,000 mg by mouth 2 (two) times daily.  0    zolpidem (AMBIEN) 10 mg Tab Take 10 mg by mouth nightly.      butalbital-acetaminophen-caffeine -40 mg (FIORICET, ESGIC) -40 mg per tablet TAKE ONE TABLET BY MOUTH EVERY 6 HOURS AS NEEDED FOR MIGRAINE       No current facility-administered medications for this visit.        Review of patient's allergies indicates:   Allergen Reactions    Hydromorphone (bulk) Itching         Review of Systems  Review of Systems   Constitutional: Negative for activity change, appetite change, chills, fatigue, fever and unexpected weight change.   HENT: Negative for mouth sores.    Respiratory: Negative for cough, shortness of breath and wheezing.    Cardiovascular: Negative for chest pain and palpitations.   Gastrointestinal: Negative for abdominal pain, bloating, blood in stool, constipation, nausea and vomiting.   Endocrine: Negative for diabetes and hot flashes.   Genitourinary: Positive for vaginal discharge and vaginal odor. Negative for dysmenorrhea, dyspareunia, dysuria, frequency, hematuria, menorrhagia, menstrual problem, pelvic pain, urgency, vaginal bleeding, vaginal pain, urinary incontinence and postcoital bleeding.   Musculoskeletal: Negative for back pain and myalgias.   Integumentary:  Negative for rash, breast mass and nipple discharge.   Neurological: Negative for seizures and headaches.   Psychiatric/Behavioral: Negative for depression and sleep disturbance. The patient is not nervous/anxious.    Breast: Negative for mass, mastodynia and  nipple discharge          Objective:    Physical Exam:   Constitutional: She appears well-developed and well-nourished. No distress.   BMI of 48    HENT:   Head: Normocephalic and atraumatic.    Eyes: EOM are normal.    Neck: Normal range of motion.     Pulmonary/Chest: Effort normal. No respiratory distress.   Breasts: Non-tender, no engorgement, no masses, no retraction, no discharge. Negative for lymphadenopathy.         Abdominal: Soft. She exhibits no distension. There is no tenderness. There is no rebound and no guarding.     Genitourinary: Uterus normal. Vaginal discharge found.   Genitourinary Comments: Vulva without any obvious lesions.  Urethral meatus normal size and location without any lesion.  Urethra is non-tender without stricture or discharge.  Bladder is non-tender.  Vaginal vault with good support.  Moderate white discharge noted.  No obvious lesion.  Normal rugation.  Cervix is without any cervical motion tenderness.  No obvious lesion.  Uterus is small, non-tender, normal contour.  Adnexa is without any masses or tenderness.  Perineum without obvious lesion.             Musculoskeletal: Normal range of motion.       Neurological: She is alert.    Skin: Skin is warm and dry.    Psychiatric: She has a normal mood and affect.          Assessment:        1. Well woman exam with routine gynecological exam    2. PCOS (polycystic ovarian syndrome)    3. Class 3 severe obesity with body mass index (BMI) of 45.0 to 49.9 in adult, unspecified obesity type, unspecified whether serious comorbidity present    4. Vaginal discharge              Plan:          I have discussed with the patient her condition.  Monthly breast examination was instructed, discussed, and encouraged.  Patient was encouraged to consume a low-calorie, low fat diet, and to increase of physical activity.  Healthy habits encouraged.  A Pap smear was not performed according to the USPSTF recommendations.  Mammogram was not ordered because  of the combination of her age and risk factors, according to ACOG guidelines.  Gonorrhea and Chlamydia testing performed;  HIV test offered, again according to guidelines.   Patient is to continue her medications as prescribed.  She will come back to see me in one year for her annual visit.  She can come back to see me sooner as necessary.  All of her questions were answered appropriately to her satisfaction.     Vaginosis screen  Metronidazole  Continue to lose weight with diet and exercise

## 2020-06-13 LAB
BACTERIAL VAGINOSIS DNA: POSITIVE
C TRACH DNA SPEC QL NAA+PROBE: NOT DETECTED
CANDIDA GLABRATA DNA: NEGATIVE
CANDIDA KRUSEI DNA: NEGATIVE
CANDIDA RRNA VAG QL PROBE: NEGATIVE
N GONORRHOEA DNA SPEC QL NAA+PROBE: NOT DETECTED
T VAGINALIS RRNA GENITAL QL PROBE: NEGATIVE

## 2020-08-26 ENCOUNTER — HISTORICAL (OUTPATIENT)
Dept: LAB | Facility: HOSPITAL | Age: 30
End: 2020-08-26

## 2020-08-26 LAB
ABS NEUT (OLG): 11.14 X10(3)/MCL (ref 2.1–9.2)
ALBUMIN SERPL-MCNC: 3.6 GM/DL (ref 3.4–5)
ALBUMIN/GLOB SERPL: 1 RATIO (ref 1.1–2)
ALP SERPL-CCNC: 64 UNIT/L (ref 46–116)
ALT SERPL-CCNC: 21 UNIT/L (ref 12–78)
AMPHET UR QL SCN: NORMAL
AST SERPL-CCNC: 11 UNIT/L (ref 15–37)
BARBITURATE SCN PRESENT UR: NORMAL
BASOPHILS # BLD AUTO: 0 X10(3)/MCL (ref 0–0.2)
BASOPHILS NFR BLD AUTO: 0 %
BENZODIAZ UR QL SCN: NORMAL
BILIRUB SERPL-MCNC: 0.2 MG/DL (ref 0.2–1)
BILIRUBIN DIRECT+TOT PNL SERPL-MCNC: 0.08 MG/DL (ref 0–0.8)
BILIRUBIN DIRECT+TOT PNL SERPL-MCNC: 0.12 MG/DL (ref 0–0.2)
BUN SERPL-MCNC: 11 MG/DL (ref 7–18)
CALCIUM SERPL-MCNC: 9.2 MG/DL (ref 8.5–10.1)
CANNABINOIDS UR QL SCN: NORMAL
CHLORIDE SERPL-SCNC: 108 MMOL/L (ref 98–107)
CHOLEST SERPL-MCNC: 179 MG/DL (ref 0–200)
CHOLEST/HDLC SERPL: 3 {RATIO} (ref 0–4)
CO2 SERPL-SCNC: 27.8 MMOL/L (ref 21–32)
COCAINE UR QL SCN: NORMAL
CREAT SERPL-MCNC: 0.91 MG/DL (ref 0.6–1.3)
DEPRECATED CALCIDIOL+CALCIFEROL SERPL-MC: 29.7 NG/ML (ref 6.6–49.9)
ERYTHROCYTE [DISTWIDTH] IN BLOOD BY AUTOMATED COUNT: 12.5 % (ref 11.5–17)
EST. AVERAGE GLUCOSE BLD GHB EST-MCNC: 120 MG/DL
GLOBULIN SER-MCNC: 3.7 GM/DL (ref 2.4–3.5)
GLUCOSE SERPL-MCNC: 119 MG/DL (ref 74–106)
HBA1C MFR BLD: 5.8 % (ref 4.5–6.2)
HCT VFR BLD AUTO: 38.1 % (ref 37–47)
HDLC SERPL-MCNC: 60 MG/DL (ref 40–60)
HGB BLD-MCNC: 12.5 GM/DL (ref 12–16)
IMM GRANULOCYTES # BLD AUTO: 0.01 % (ref 0–0.02)
IMM GRANULOCYTES NFR BLD AUTO: 0.1 % (ref 0–0.43)
LDLC SERPL CALC-MCNC: 110 MG/DL (ref 0–129)
LYMPHOCYTES # BLD AUTO: 1.6 X10(3)/MCL (ref 0.6–4.6)
LYMPHOCYTES NFR BLD AUTO: 12 %
MCH RBC QN AUTO: 28.2 PG (ref 27–31)
MCHC RBC AUTO-ENTMCNC: 32.8 GM/DL (ref 33–36)
MCV RBC AUTO: 86 FL (ref 80–94)
MONOCYTES # BLD AUTO: 0.6 X10(3)/MCL (ref 0.1–1.3)
MONOCYTES NFR BLD AUTO: 4 %
NEUTROPHILS # BLD AUTO: 11.14 X10(3)/MCL (ref 1.4–7.9)
NEUTROPHILS NFR BLD AUTO: 84 %
OPIATES UR QL SCN: NORMAL
PCP UR QL: NORMAL
PH UR STRIP.AUTO: 5.5 [PH] (ref 5–7.5)
PLATELET # BLD AUTO: 350 X10(3)/MCL (ref 130–400)
PMV BLD AUTO: 9.2 FL (ref 9.4–12.4)
POTASSIUM SERPL-SCNC: 4.3 MMOL/L (ref 3.5–5.1)
PROT SERPL-MCNC: 7.3 GM/DL (ref 6.4–8.2)
RBC # BLD AUTO: 4.43 X10(6)/MCL (ref 4.2–5.4)
SODIUM SERPL-SCNC: 142 MMOL/L (ref 136–145)
TRIGL SERPL-MCNC: 46 MG/DL
TSH SERPL-ACNC: 1.25 MIU/ML (ref 0.36–3.74)
VIT B12 SERPL-MCNC: 369 PG/ML (ref 193–986)
VLDLC SERPL CALC-MCNC: 9 MG/DL
WBC # SPEC AUTO: 13.4 X10(3)/MCL (ref 4.5–11.5)

## 2020-09-03 ENCOUNTER — HISTORICAL (OUTPATIENT)
Dept: LAB | Facility: HOSPITAL | Age: 30
End: 2020-09-03

## 2020-09-03 LAB
ABS NEUT (OLG): 3.4 X10(3)/MCL (ref 2.1–9.2)
BASOPHILS # BLD AUTO: 0 X10(3)/MCL (ref 0–0.2)
BASOPHILS NFR BLD AUTO: 0 %
EOSINOPHIL # BLD AUTO: 0.1 X10(3)/MCL (ref 0–0.9)
EOSINOPHIL NFR BLD AUTO: 2 %
ERYTHROCYTE [DISTWIDTH] IN BLOOD BY AUTOMATED COUNT: 12.9 % (ref 11.5–17)
HCT VFR BLD AUTO: 41.3 % (ref 37–47)
HGB BLD-MCNC: 13.4 GM/DL (ref 12–16)
IMM GRANULOCYTES # BLD AUTO: 0.01 % (ref 0–0.02)
IMM GRANULOCYTES NFR BLD AUTO: 0.2 % (ref 0–0.43)
LYMPHOCYTES # BLD AUTO: 1.9 X10(3)/MCL (ref 0.6–4.6)
LYMPHOCYTES NFR BLD AUTO: 30 %
MCH RBC QN AUTO: 28.1 PG (ref 27–31)
MCHC RBC AUTO-ENTMCNC: 32.4 GM/DL (ref 33–36)
MCV RBC AUTO: 86.6 FL (ref 80–94)
MONOCYTES # BLD AUTO: 0.8 X10(3)/MCL (ref 0.1–1.3)
MONOCYTES NFR BLD AUTO: 13 %
NEUTROPHILS # BLD AUTO: 3.4 X10(3)/MCL (ref 1.4–7.9)
NEUTROPHILS NFR BLD AUTO: 55 %
PLATELET # BLD AUTO: 292 X10(3)/MCL (ref 130–400)
PMV BLD AUTO: 8.7 FL (ref 9.4–12.4)
RBC # BLD AUTO: 4.77 X10(6)/MCL (ref 4.2–5.4)
WBC # SPEC AUTO: 6.2 X10(3)/MCL (ref 4.5–11.5)

## 2021-04-16 ENCOUNTER — PATIENT MESSAGE (OUTPATIENT)
Dept: RESEARCH | Facility: HOSPITAL | Age: 31
End: 2021-04-16

## 2021-09-24 ENCOUNTER — HISTORICAL (OUTPATIENT)
Dept: ADMINISTRATIVE | Facility: HOSPITAL | Age: 31
End: 2021-09-24

## 2021-09-24 LAB
CHOLEST SERPL-MCNC: 148 MG/DL
CHOLEST/HDLC SERPL: 4 {RATIO} (ref 0–5)
EST. AVERAGE GLUCOSE BLD GHB EST-MCNC: 114 MG/DL
HBA1C MFR BLD: 5.6 %
HDLC SERPL-MCNC: 35 MG/DL (ref 35–60)
LDLC SERPL CALC-MCNC: 100 MG/DL (ref 50–140)
TRIGL SERPL-MCNC: 66 MG/DL (ref 37–140)
VLDLC SERPL CALC-MCNC: 13 MG/DL

## 2021-10-11 ENCOUNTER — HISTORICAL (OUTPATIENT)
Dept: ADMINISTRATIVE | Facility: HOSPITAL | Age: 31
End: 2021-10-11

## 2021-11-11 ENCOUNTER — HISTORICAL (OUTPATIENT)
Dept: ADMINISTRATIVE | Facility: HOSPITAL | Age: 31
End: 2021-11-11

## 2021-12-01 ENCOUNTER — HISTORICAL (OUTPATIENT)
Dept: RADIOLOGY | Facility: HOSPITAL | Age: 31
End: 2021-12-01

## 2021-12-16 ENCOUNTER — HISTORICAL (OUTPATIENT)
Dept: CARDIOLOGY | Facility: HOSPITAL | Age: 31
End: 2021-12-16

## 2022-04-10 ENCOUNTER — HISTORICAL (OUTPATIENT)
Dept: ADMINISTRATIVE | Facility: HOSPITAL | Age: 32
End: 2022-04-10
Payer: MEDICAID

## 2022-04-21 DIAGNOSIS — G43.009 MIGRAINE WITHOUT AURA AND WITHOUT STATUS MIGRAINOSUS, NOT INTRACTABLE: Primary | ICD-10-CM

## 2022-04-26 VITALS
DIASTOLIC BLOOD PRESSURE: 88 MMHG | WEIGHT: 293 LBS | OXYGEN SATURATION: 98 % | SYSTOLIC BLOOD PRESSURE: 139 MMHG | BODY MASS INDEX: 39.68 KG/M2 | HEIGHT: 72 IN

## 2023-01-29 NOTE — PROGRESS NOTES
Subjective:      Chief Complaint:    Chief Complaint   Patient presents with    Vaginal Discharge       Menstrual History:    OB History        1    Para        Term                AB        Living           SAB        TAB        Ectopic        Multiple        Live Births                     Menarche age: 11     Patient's last menstrual period was 2019 (approximate).            Objective:        History of Present Illness AND  Examination detailed DICTATE:  DR. ENCARNACION DICTATING A PROBLEM FOCUSED EXAM ON [facility] AND CALL OUR 29 YEARS OF AGE PATIENT IS A NEW PATIENT TO ME OLD PATIENT TO THE CLINIC THE PATIENT IS A  1 PARA 0 2019 WAS NORMAL PATIENT PAST MEDICAL HISTORY BY HISTORY OF PAP PCOS HYPERTENSION IBS TN JOINT PROBLEM AND RENAL STONES SURGERY CYSTOSCOPY ULTRASOUND LAST YEAR ESSENTIALLY WAS NEGATIVE PATIENT MENSTRUAL CYCLE LAST PERIOD 2019 PATIENT'S MENARCHE AGE 11 REGULAR CYCLES EVER SINCE 6 DAYS DURATION BLEEDING WITH MILD DISCOMFORT AND PAIN.  THE PATIENT WAS DIAGNOSED WITH POLYCYSTIC OVARIAN SYNDROME ON POSSIBLY BECAUSE OF THE INCREASED HER WEIGHT PROBLEM HAVE A DIRECT REGULAR NORMAL CYCLES WOULD GO AGAINST POLYCYSTIC OVARY PATIENT IS PRESENT IN THE CLINIC BECAUSE OF VAGINAL DISCHARGE   PHYSICAL EXAM BLOOD PRESSURE 1 20-82 WEIGHT 383 PELVIC EXAM EXTERNAL NORMAL VULVA NORMAL BUT DUE TO SKENE'S GLANDS ARE NEGATIVE VAGINA DARK BLOOD IS NOTED MENSTRUAL CYCLE SO UNABLE TO TELL NATURE OF THE DISCHARGE CERVIX IS SMALL BLEEDING UTERUS SMALL ADNEXA IS NEGATIVE GOOD PELVIC SUPPORT NO PAIN GOOD MOTILITY PATIENT DOES NOT APPEAR TO BE HIRSUTE NO CLITORAL ENLARGEMENT DIAGNOSIS POSSIBLE MILD L4 FORM OF POLYCYSTIC OVARIES PLAN CULTURES WILL DO A BASELINE POLYCYSTIC OVARIAN SYNDROME WORKUP AND THEN WILL DECIDE ON FOLLOW-UP TREATMENT PROBABLY CLOMID STIMULATION WOULD BE ADVISABLE        Assessment:      Diagnosis:  PCOS     FERTILITY   PROBLEM     VAG   DISCHARGE.         Plan:       Return in 6  weeks         Resulted

## 2023-07-14 ENCOUNTER — HOSPITAL ENCOUNTER (EMERGENCY)
Facility: HOSPITAL | Age: 33
Discharge: HOME | End: 2023-07-14
Attending: EMERGENCY MEDICINE
Payer: MEDICAID

## 2023-07-14 VITALS
BODY MASS INDEX: 39.68 KG/M2 | HEIGHT: 72 IN | RESPIRATION RATE: 20 BRPM | TEMPERATURE: 99 F | HEART RATE: 74 BPM | WEIGHT: 293 LBS | SYSTOLIC BLOOD PRESSURE: 157 MMHG | OXYGEN SATURATION: 97 % | DIASTOLIC BLOOD PRESSURE: 104 MMHG

## 2023-07-14 DIAGNOSIS — U07.1 COVID: Primary | ICD-10-CM

## 2023-07-14 LAB
FLUAV AG UPPER RESP QL IA.RAPID: NOT DETECTED
FLUBV AG UPPER RESP QL IA.RAPID: NOT DETECTED
SARS-COV-2 RNA RESP QL NAA+PROBE: DETECTED
STREP A PCR (OHS): NOT DETECTED

## 2023-07-14 PROCEDURE — 87651 STREP A DNA AMP PROBE: CPT | Performed by: EMERGENCY MEDICINE

## 2023-07-14 PROCEDURE — 99282 EMERGENCY DEPT VISIT SF MDM: CPT

## 2023-07-14 PROCEDURE — 0240U COVID/FLU A&B PCR: CPT | Performed by: EMERGENCY MEDICINE

## 2023-07-14 NOTE — ED PROVIDER NOTES
Encounter Date: 7/14/2023       History     Chief Complaint   Patient presents with    Headache     Pt seen at urgent care twice over past week and given Z pack but not better --c/o headache, chills, body aches, sore throat, cough      This 32-year-old female has been sick for the past 5 days with upper respiratory congestion, sore throat, cough.  She has no fever she is had no shortness of breath.  She is been seen twice in a walk-in clinic and was put on a Z-Daniel.     Review of patient's allergies indicates:   Allergen Reactions    Hydromorphone Rash     Past Medical History:   Diagnosis Date    Anxiety     Back problem     Hypertension     IBS (irritable bowel syndrome)     Migraine headache     Obesity     TMJ (temporomandibular joint disorder)      Past Surgical History:   Procedure Laterality Date    CYSTOURETEROSCOPY WITH RETROGRADE PYELOGRAPHY AND INSERTION OF STENT INTO URETER  10/8/2019    Procedure: CYSTOURETEROSCOPY, WITH RETROGRADE PYELOGRAM AND URETERAL STENT INSERTION;  Surgeon: rKisty Ahmadi MD;  Location: [facility] OR;  Service: Urology;;    URETEROSCOPIC REMOVAL OF URETERIC CALCULUS Left 10/8/2019    Procedure: Cystoscopy, possible retrograde pyelogram, ureteroscopy with laser lithotripsy, placement of ureteral stent;  Surgeon: Kristy Ahmadi MD;  Location: [facility] OR;  Service: Urology;  Laterality: Left;  RN PREOP 10/7/2019     Family History   Problem Relation Age of Onset    Heart attack Paternal Grandfather     Cancer Paternal Grandmother 50        Pancreatic cancer    Emphysema Maternal Grandfather     Breast cancer Neg Hx     Colon cancer Neg Hx     Ovarian cancer Neg Hx      Social History     Tobacco Use    Smoking status: Never    Smokeless tobacco: Never   Substance Use Topics    Alcohol use: No     Comment: socially    Drug use: No     Review of Systems   Constitutional:  Negative for fever.   HENT:  Positive for congestion and sore throat.    Respiratory:  Positive for cough.  Negative for shortness of breath.    Cardiovascular:  Negative for chest pain.   Gastrointestinal:  Negative for nausea.   Genitourinary:  Negative for dysuria.   Musculoskeletal:  Negative for back pain.   Skin:  Negative for rash.   Neurological:  Negative for weakness.   Hematological:  Does not bruise/bleed easily.     Physical Exam     Initial Vitals [07/14/23 0845]   BP Pulse Resp Temp SpO2   (!) 157/104 74 20 98.5 °F (36.9 °C) 97 %      MAP       --         Physical Exam    Nursing note and vitals reviewed.  Constitutional: She appears well-developed and well-nourished.   HENT:   Head: Normocephalic and atraumatic.   Eyes: Conjunctivae and EOM are normal. Pupils are equal, round, and reactive to light.   Neck: Neck supple.   Normal range of motion.  Cardiovascular:  Normal rate, regular rhythm, normal heart sounds and intact distal pulses.           Pulmonary/Chest: Breath sounds normal.   Abdominal: Abdomen is soft. Bowel sounds are normal.   Musculoskeletal:         General: Normal range of motion.      Cervical back: Normal range of motion and neck supple.     Neurological: She is alert and oriented to person, place, and time. She has normal strength.   Skin: Skin is warm and dry. Capillary refill takes less than 2 seconds.   Psychiatric: She has a normal mood and affect. Her behavior is normal. Judgment and thought content normal.       ED Course   Procedures  Labs Reviewed   COVID/FLU A&B PCR - Abnormal; Notable for the following components:       Result Value    SARS-CoV-2 PCR Detected (*)     All other components within normal limits    Narrative:     The Xpert Xpress SARS-CoV-2/FLU/RSV plus is a rapid, multiplexed real-time PCR test intended for the simultaneous qualitative detection and differentiation of SARS-CoV-2, Influenza A, Influenza B, and respiratory syncytial virus (RSV) viral RNA in either nasopharyngeal swab or nasal swab specimens.         STREP GROUP A BY PCR - Normal    Narrative:      The Xpert Xpress Strep A test is a rapid, qualitative in vitro diagnostic test for the detection of Streptococcus pyogenes (Group A ß-hemolytic Streptococcus, Strep A) in throat swab specimens from patients with signs and symptoms of pharyngitis.            Imaging Results    None          Medications - No data to display                           Clinical Impression:   Final diagnoses:  [U07.1] COVID (Primary)        ED Disposition Condition    Discharge Stable          ED Prescriptions    None       Follow-up Information       Follow up With Specialties Details Why Contact Info    Jose Dupont Jr., MD Family Medicine  As needed [address] [facility]  [place] [address]  [phone]               Wilton Acevedo MD  07/14/23 6901

## 2023-07-14 NOTE — Clinical Note
"Elena Hernandez" Erik was seen and treated in our emergency department on 7/14/2023.  She may return to work on 07/19/2023.       If you have any questions or concerns, please don't hesitate to call.      ZEESHAN SOLO    "

## 2023-10-02 ENCOUNTER — HOSPITAL ENCOUNTER (EMERGENCY)
Facility: HOSPITAL | Age: 33
Discharge: HOME | End: 2023-10-02
Attending: EMERGENCY MEDICINE
Payer: MEDICAID

## 2023-10-02 VITALS
DIASTOLIC BLOOD PRESSURE: 79 MMHG | TEMPERATURE: 100 F | WEIGHT: 293 LBS | BODY MASS INDEX: 53.16 KG/M2 | SYSTOLIC BLOOD PRESSURE: 165 MMHG | RESPIRATION RATE: 17 BRPM | HEART RATE: 81 BPM | OXYGEN SATURATION: 97 %

## 2023-10-02 DIAGNOSIS — R00.2 PALPITATIONS: Primary | ICD-10-CM

## 2023-10-02 DIAGNOSIS — R07.9 CHEST PAIN: ICD-10-CM

## 2023-10-02 LAB
ALBUMIN SERPL BCP-MCNC: 3.7 G/DL (ref 3.5–5.2)
ALP SERPL-CCNC: 56 U/L (ref 55–135)
ALT SERPL W/O P-5'-P-CCNC: 18 U/L (ref 10–44)
ANION GAP SERPL CALC-SCNC: 7 MMOL/L (ref 8–16)
AST SERPL-CCNC: 14 U/L (ref 10–40)
BASOPHILS # BLD AUTO: 0.09 K/UL (ref 0–0.2)
BASOPHILS NFR BLD: 1.2 % (ref 0–1.9)
BILIRUB SERPL-MCNC: 0.4 MG/DL (ref 0.1–1)
BNP SERPL-MCNC: 21 PG/ML (ref 0–99)
BUN SERPL-MCNC: 14 MG/DL (ref 6–20)
CALCIUM SERPL-MCNC: 9.1 MG/DL (ref 8.7–10.5)
CHLORIDE SERPL-SCNC: 106 MMOL/L (ref 95–110)
CK SERPL-CCNC: 62 U/L (ref 20–180)
CO2 SERPL-SCNC: 27 MMOL/L (ref 23–29)
CREAT SERPL-MCNC: 0.9 MG/DL (ref 0.5–1.4)
CTP QC/QA: YES
D DIMER PPP IA.FEU-MCNC: 0.31 MG/L FEU
DIFFERENTIAL METHOD: ABNORMAL
EOSINOPHIL # BLD AUTO: 0.1 K/UL (ref 0–0.5)
EOSINOPHIL NFR BLD: 0.9 % (ref 0–8)
ERYTHROCYTE [DISTWIDTH] IN BLOOD BY AUTOMATED COUNT: 14.1 % (ref 11.5–14.5)
EST. GFR  (NO RACE VARIABLE): >60 ML/MIN/1.73 M^2
GLUCOSE SERPL-MCNC: 102 MG/DL (ref 70–110)
HCT VFR BLD AUTO: 41.5 % (ref 37–48.5)
HGB BLD-MCNC: 13.1 G/DL (ref 12–16)
IMM GRANULOCYTES # BLD AUTO: 0.02 K/UL (ref 0–0.04)
IMM GRANULOCYTES NFR BLD AUTO: 0.3 % (ref 0–0.5)
LYMPHOCYTES # BLD AUTO: 2.5 K/UL (ref 1–4.8)
LYMPHOCYTES NFR BLD: 32.9 % (ref 18–48)
MAGNESIUM SERPL-MCNC: 2 MG/DL (ref 1.6–2.6)
MCH RBC QN AUTO: 26.9 PG (ref 27–31)
MCHC RBC AUTO-ENTMCNC: 31.6 G/DL (ref 32–36)
MCV RBC AUTO: 85 FL (ref 82–98)
MONOCYTES # BLD AUTO: 0.8 K/UL (ref 0.3–1)
MONOCYTES NFR BLD: 10.8 % (ref 4–15)
NEUTROPHILS # BLD AUTO: 4.2 K/UL (ref 1.8–7.7)
NEUTROPHILS NFR BLD: 53.9 % (ref 38–73)
NRBC BLD-RTO: 0 /100 WBC
PLATELET # BLD AUTO: 320 K/UL (ref 150–450)
PMV BLD AUTO: 9.9 FL (ref 9.2–12.9)
POTASSIUM SERPL-SCNC: 4.2 MMOL/L (ref 3.5–5.1)
PROT SERPL-MCNC: 7.4 G/DL (ref 6–8.4)
RBC # BLD AUTO: 4.87 M/UL (ref 4–5.4)
SARS-COV-2 RDRP RESP QL NAA+PROBE: NEGATIVE
SODIUM SERPL-SCNC: 140 MMOL/L (ref 136–145)
TROPONIN I SERPL DL<=0.01 NG/ML-MCNC: <0.006 NG/ML (ref 0–0.03)
WBC # BLD AUTO: 7.69 K/UL (ref 3.9–12.7)

## 2023-10-02 PROCEDURE — 99285 EMERGENCY DEPT VISIT HI MDM: CPT | Mod: 25

## 2023-10-02 PROCEDURE — 80053 COMPREHEN METABOLIC PANEL: CPT | Performed by: EMERGENCY MEDICINE

## 2023-10-02 PROCEDURE — 93010 ELECTROCARDIOGRAM REPORT: CPT | Mod: ,,, | Performed by: INTERNAL MEDICINE

## 2023-10-02 PROCEDURE — 85025 COMPLETE CBC W/AUTO DIFF WBC: CPT | Performed by: EMERGENCY MEDICINE

## 2023-10-02 PROCEDURE — 83880 ASSAY OF NATRIURETIC PEPTIDE: CPT | Performed by: EMERGENCY MEDICINE

## 2023-10-02 PROCEDURE — 93010 EKG 12-LEAD: ICD-10-PCS | Mod: ,,, | Performed by: INTERNAL MEDICINE

## 2023-10-02 PROCEDURE — 87635 SARS-COV-2 COVID-19 AMP PRB: CPT | Performed by: EMERGENCY MEDICINE

## 2023-10-02 PROCEDURE — 85379 FIBRIN DEGRADATION QUANT: CPT | Performed by: EMERGENCY MEDICINE

## 2023-10-02 PROCEDURE — 25000003 PHARM REV CODE 250: Performed by: EMERGENCY MEDICINE

## 2023-10-02 PROCEDURE — 84484 ASSAY OF TROPONIN QUANT: CPT | Performed by: EMERGENCY MEDICINE

## 2023-10-02 PROCEDURE — 82550 ASSAY OF CK (CPK): CPT | Performed by: EMERGENCY MEDICINE

## 2023-10-02 PROCEDURE — 83735 ASSAY OF MAGNESIUM: CPT | Performed by: EMERGENCY MEDICINE

## 2023-10-02 PROCEDURE — 93005 ELECTROCARDIOGRAM TRACING: CPT

## 2023-10-02 RX ORDER — ACETAMINOPHEN 500 MG
1000 TABLET ORAL EVERY 6 HOURS PRN
Qty: 60 TABLET | Refills: 0 | Status: SHIPPED | OUTPATIENT
Start: 2023-10-02 | End: 2024-01-19

## 2023-10-02 RX ORDER — ACETAMINOPHEN 500 MG
1000 TABLET ORAL
Status: COMPLETED | OUTPATIENT
Start: 2023-10-02 | End: 2023-10-02

## 2023-10-02 RX ADMIN — ACETAMINOPHEN 1000 MG: 500 TABLET, FILM COATED ORAL at 04:10

## 2023-10-02 NOTE — Clinical Note
"Elena Summersine" Erik was seen and treated in our emergency department on 10/2/2023.  She may return to work on 10/03/2023.       If you have any questions or concerns, please don't hesitate to call.      Silvestre Mcqueen MD"

## 2023-10-02 NOTE — ED PROVIDER NOTES
Encounter Date: 10/2/2023       History     Chief Complaint   Patient presents with    Chest Pain     Hx anxiety. Drank energy drinks yesterday and then began to feel palpitations. Pt woke up with morning with chest discomfort and nausea. Seen at [facility] yesterday and refused admission     HPI    33-year-old female with past medical history anxiety, hypertension, IBS, obesity presents with chest pain, palpitations.  Patient reports being evaluated at [facility] earlier today and states that she was evaluated in that her heart appeared normal however she was advised to come to ER for further evaluation.  She is unclear of what concerns there were.  She does report drinking a triple shock a monster energy drink yesterday and states that she had some discomfort in her chest, tightness and some palpitations that have since resolved.  She reports being only on amlodipine at this point, but did take a lisinopril this morning as her blood pressure was high.  She is working with a new primary care physician to help control her blood pressure better.  She reports no current symptoms, denies any cough, sputum production, abdominal pain, nausea/vomiting/diarrhea, headache, chest pain, shortness breath, or any further complaints.    Review of patient's allergies indicates:   Allergen Reactions    Hydromorphone Rash     Past Medical History:   Diagnosis Date    Anxiety     Back problem     Hypertension     IBS (irritable bowel syndrome)     Migraine headache     Obesity     TMJ (temporomandibular joint disorder)      Past Surgical History:   Procedure Laterality Date    CYSTOURETEROSCOPY WITH RETROGRADE PYELOGRAPHY AND INSERTION OF STENT INTO URETER  10/8/2019    Procedure: CYSTOURETEROSCOPY, WITH RETROGRADE PYELOGRAM AND URETERAL STENT INSERTION;  Surgeon: Kristy Ahmadi MD;  Location: [facility] OR;  Service: Urology;;    URETEROSCOPIC REMOVAL OF URETERIC CALCULUS Left 10/8/2019    Procedure:  Cystoscopy, possible retrograde pyelogram, ureteroscopy with laser lithotripsy, placement of ureteral stent;  Surgeon: Kristy Ahmadi MD;  Location: [facility] OR;  Service: Urology;  Laterality: Left;  RN PREOP 10/7/2019     Family History   Problem Relation Age of Onset    Heart attack Paternal Grandfather     Cancer Paternal Grandmother 50        Pancreatic cancer    Emphysema Maternal Grandfather     Breast cancer Neg Hx     Colon cancer Neg Hx     Ovarian cancer Neg Hx      Social History     Tobacco Use    Smoking status: Never    Smokeless tobacco: Never   Substance Use Topics    Alcohol use: No     Comment: socially    Drug use: No     Review of Systems   Constitutional: Negative.    HENT: Negative.     Eyes: Negative.    Respiratory:  Positive for chest tightness.    Cardiovascular:  Positive for palpitations.   Gastrointestinal: Negative.    Genitourinary: Negative.    Musculoskeletal: Negative.    Skin: Negative.    Neurological: Negative.        Physical Exam     Initial Vitals [10/02/23 1529]   BP Pulse Resp Temp SpO2   (!) 196/113 96 17 98.6 °F (37 °C) 97 %      MAP       --         Physical Exam    Nursing note and vitals reviewed.  Constitutional: She appears well-developed and well-nourished. She is not diaphoretic. No distress.   HENT:   Head: Normocephalic and atraumatic.   Nose: Nose normal.   Eyes: EOM are normal. Pupils are equal, round, and reactive to light.   Neck: Neck supple. No JVD present.   Normal range of motion.  Cardiovascular:  Normal rate, regular rhythm, normal heart sounds and intact distal pulses.           Pulmonary/Chest: Breath sounds normal. No stridor. No respiratory distress. She has no wheezes. She has no rhonchi. She has no rales.   Abdominal: Abdomen is soft. Bowel sounds are normal. She exhibits no distension. There is no abdominal tenderness.   Musculoskeletal:         General: No tenderness or edema. Normal range of motion.      Cervical back: Normal range of motion  and neck supple.     Neurological: She is alert and oriented to person, place, and time. She has normal strength.   Skin: Skin is warm and dry. Capillary refill takes less than 2 seconds. No rash noted. No erythema.         ED Course   Procedures  Labs Reviewed   CBC W/ AUTO DIFFERENTIAL - Abnormal; Notable for the following components:       Result Value    MCH 26.9 (*)     MCHC 31.6 (*)     All other components within normal limits   COMPREHENSIVE METABOLIC PANEL - Abnormal; Notable for the following components:    Anion Gap 7 (*)     All other components within normal limits   TROPONIN I   B-TYPE NATRIURETIC PEPTIDE   D DIMER, QUANTITATIVE   MAGNESIUM   CK   SARS-COV-2 RDRP GENE          Imaging Results              X-Ray Chest AP Portable (Final result)  Result time 10/02/23 17:04:38      Final result by Nba Ceja MD (10/02/23 17:04:38)                   Impression:      1. No acute cardiopulmonary process.      Electronically signed by: Nba Ceja MD  Date:    10/02/2023  Time:    17:04               Narrative:    EXAMINATION:  XR CHEST AP PORTABLE    CLINICAL HISTORY:  Chest Pain;    TECHNIQUE:  Single frontal view of the chest was performed.    COMPARISON:  08/14/2021    FINDINGS:  The cardiomediastinal silhouette is not enlarged.  There is no pleural effusion.  The trachea is midline.  The lungs are symmetrically expanded bilaterally without evidence of acute parenchymal process. No large focal consolidation seen.  There is no pneumothorax.  The osseous structures are unremarkable.                                       Medications   acetaminophen tablet 1,000 mg (1,000 mg Oral Given 10/2/23 6128)     Medical Decision Making  Amount and/or Complexity of Data Reviewed  Labs: ordered.  Radiology: ordered.    Risk  OTC drugs.      MDM:      33-year-old female with past medical history anxiety, hypertension, IBS, obesity presents with chest pain, palpitations.  Physical exam as noted above.  ED  workup notable for negative COVID, D-dimer 0.31, magnesium 2.0, CPK 62, CBC/CMP within normal limits, troponin negative, BNP 21, chest x-ray unremarkable, EKG shows normal sinus rhythm rate of 86 beats per minute, nonspecific T-wave inversion noted in lead 3, otherwise unremarkable appearing EKG, no ischemic pattern noted, no STEMI.  Patient presentation consistent with palpitations likely secondary to caffeine and energy drink intake, symptoms have resolved, no further acute findings noted, negative cardiac workup, patient will continue to work with the primary care physician on controlling her blood pressure.  Discussed diagnosis and further treatment with patient, including f/u.  Return precautions given and all questions answered.  Patient in understanding of plan.  Pt discharged to home improved and stable.        Note was created using voice recognition software. Note may have occasional typographical or grammatical errors, garbled syntax, and other bizarre constructions that may not have been identified and edited despite good tyrell initial review prior to signing.                                  Clinical Impression:   Final diagnoses:  [R07.9] Chest pain  [R00.2] Palpitations (Primary)        ED Disposition Condition    Discharge Stable          ED Prescriptions       Medication Sig Dispense Start Date End Date Auth. Provider    acetaminophen (TYLENOL) 500 MG tablet Take 2 tablets (1,000 mg total) by mouth every 6 (six) hours as needed. 60 tablet 10/2/2023 -- Silvestre Mcqueen MD          Follow-up Information       Follow up With Specialties Details Why Contact [facility] Emergency Medicine Go to  If symptoms worsen [address] [facility] [id number] [phone]    Jose Dupont Jr., MD Family Medicine Go in 1 week As needed [address] [facility] [address]  [phone]               Silvestre Mcqueen MD  10/03/23 7484

## 2023-10-02 NOTE — ED TRIAGE NOTES
Pt reports to ED with complaint of chest discomfort upon waking up this morning. Pt states she drank an energy drink yesterday and started feeling palpitations. Pt also complains of generalized headache, and one ep of vomiting. Pt denies Sob. Pt has history of HTN, and states she took her Bp medication this morning.

## 2024-01-19 ENCOUNTER — OFFICE VISIT (OUTPATIENT)
Dept: OBSTETRICS AND GYNECOLOGY | Facility: CLINIC | Age: 34
End: 2024-01-19
Payer: MEDICAID

## 2024-01-19 VITALS
SYSTOLIC BLOOD PRESSURE: 148 MMHG | WEIGHT: 293 LBS | DIASTOLIC BLOOD PRESSURE: 88 MMHG | HEIGHT: 72 IN | BODY MASS INDEX: 39.68 KG/M2

## 2024-01-19 DIAGNOSIS — N89.8 VAGINAL DISCHARGE: ICD-10-CM

## 2024-01-19 DIAGNOSIS — E28.2 PCOS (POLYCYSTIC OVARIAN SYNDROME): ICD-10-CM

## 2024-01-19 DIAGNOSIS — I10 HYPERTENSION, UNSPECIFIED TYPE: ICD-10-CM

## 2024-01-19 DIAGNOSIS — Z01.419 WELL WOMAN EXAM WITH ROUTINE GYNECOLOGICAL EXAM: Primary | ICD-10-CM

## 2024-01-19 DIAGNOSIS — N92.0 MENORRHAGIA WITH REGULAR CYCLE: ICD-10-CM

## 2024-01-19 DIAGNOSIS — E66.01 MORBID OBESITY WITH BMI OF 50.0-59.9, ADULT: ICD-10-CM

## 2024-01-19 PROBLEM — N76.0 ACUTE VAGINITIS: Status: RESOLVED | Noted: 2019-12-24 | Resolved: 2024-01-19

## 2024-01-19 PROBLEM — N76.1 CHRONIC VAGINITIS: Status: RESOLVED | Noted: 2019-12-31 | Resolved: 2024-01-19

## 2024-01-19 PROCEDURE — 3008F BODY MASS INDEX DOCD: CPT | Mod: CPTII,,, | Performed by: OBSTETRICS & GYNECOLOGY

## 2024-01-19 PROCEDURE — 4010F ACE/ARB THERAPY RXD/TAKEN: CPT | Mod: CPTII,,, | Performed by: OBSTETRICS & GYNECOLOGY

## 2024-01-19 PROCEDURE — 87491 CHLMYD TRACH DNA AMP PROBE: CPT | Performed by: OBSTETRICS & GYNECOLOGY

## 2024-01-19 PROCEDURE — 87624 HPV HI-RISK TYP POOLED RSLT: CPT | Performed by: OBSTETRICS & GYNECOLOGY

## 2024-01-19 PROCEDURE — 99999 PR PBB SHADOW E&M-EST. PATIENT-LVL III: CPT | Mod: PBBFAC,,, | Performed by: OBSTETRICS & GYNECOLOGY

## 2024-01-19 PROCEDURE — 99385 PREV VISIT NEW AGE 18-39: CPT | Mod: S$PBB,,, | Performed by: OBSTETRICS & GYNECOLOGY

## 2024-01-19 PROCEDURE — 3077F SYST BP >= 140 MM HG: CPT | Mod: CPTII,,, | Performed by: OBSTETRICS & GYNECOLOGY

## 2024-01-19 PROCEDURE — 1160F RVW MEDS BY RX/DR IN RCRD: CPT | Mod: CPTII,,, | Performed by: OBSTETRICS & GYNECOLOGY

## 2024-01-19 PROCEDURE — 88175 CYTOPATH C/V AUTO FLUID REDO: CPT | Performed by: OBSTETRICS & GYNECOLOGY

## 2024-01-19 PROCEDURE — 1159F MED LIST DOCD IN RCRD: CPT | Mod: CPTII,,, | Performed by: OBSTETRICS & GYNECOLOGY

## 2024-01-19 PROCEDURE — 3079F DIAST BP 80-89 MM HG: CPT | Mod: CPTII,,, | Performed by: OBSTETRICS & GYNECOLOGY

## 2024-01-19 PROCEDURE — 99213 OFFICE O/P EST LOW 20 MIN: CPT | Mod: PBBFAC | Performed by: OBSTETRICS & GYNECOLOGY

## 2024-01-19 PROCEDURE — 81514 NFCT DS BV&VAGINITIS DNA ALG: CPT | Performed by: OBSTETRICS & GYNECOLOGY

## 2024-01-19 RX ORDER — ERGOCALCIFEROL 1.25 MG/1
50000 CAPSULE ORAL
COMMUNITY
Start: 2024-01-04

## 2024-01-19 RX ORDER — LISINOPRIL 10 MG/1
10 TABLET ORAL
COMMUNITY
Start: 2024-01-04 | End: 2024-04-25

## 2024-01-19 NOTE — PROGRESS NOTES
Subjective:       Patient ID: Elena Valencia is a 33 y.o. female.    Chief Complaint:  Well Woman (Pt here for annual and pap.)        History of Present Illness  HPI  Annual Exam-Premenopausal  Patient presents for annual exam.  The patient is sexually active. GYN screening history: no prior history of gyn screening tests. The patient wears seatbelts: yes. The patient participates in regular exercise: no. Has the patient ever been transfused or tattooed?:  no / yes . The patient reports that there is not domestic violence in her life.    Now with heavy and  prolonged menses.  Was on OCP and Depo Provera previously.  Does not want to get back on those  Wants children.      Has had a vaginal discharge for the past month.  Wants testing    History of chronic hypertension.  On medication      GYN & OB History  Patient's last menstrual period was 2023 (exact date).   Date of Last Pap: No result found    OB History    Para Term  AB Living   1             SAB IAB Ectopic Multiple Live Births                  # Outcome Date GA Lbr Leonardo/2nd Weight Sex Delivery Anes PTL Lv   1                 Past Medical History:   Diagnosis Date    Anxiety     Back problem     Hypertension     IBS (irritable bowel syndrome)     Migraine headache     Obesity     TMJ (temporomandibular joint disorder)        Past Surgical History:   Procedure Laterality Date    CYSTOURETEROSCOPY WITH RETROGRADE PYELOGRAPHY AND INSERTION OF STENT INTO URETER  10/8/2019    Procedure: CYSTOURETEROSCOPY, WITH RETROGRADE PYELOGRAM AND URETERAL STENT INSERTION;  Surgeon: Kristy Ahmadi MD;  Location: [facility] OR;  Service: Urology;;    URETEROSCOPIC REMOVAL OF URETERIC CALCULUS Left 10/8/2019    Procedure: Cystoscopy, possible retrograde pyelogram, ureteroscopy with laser lithotripsy, placement of ureteral stent;  Surgeon: Kristy Ahmadi MD;  Location: [facility] OR;  Service: Urology;  Laterality: Left;  RN PREOP 10/7/2019        Family History   Problem Relation Age of Onset    Heart attack Paternal Grandfather     Cancer Paternal Grandmother 50        Pancreatic cancer    Emphysema Maternal Grandfather     Breast cancer Neg Hx     Colon cancer Neg Hx     Ovarian cancer Neg Hx        Social History     Socioeconomic History    Marital status: Single   Tobacco Use    Smoking status: Never    Smokeless tobacco: Never   Substance and Sexual Activity    Alcohol use: No     Comment: socially    Drug use: No    Sexual activity: Yes     Partners: Male     Birth control/protection: Condom   Social History Narrative    Together since 2010    He drives trucks    She is a        Current Outpatient Medications   Medication Sig Dispense Refill    amLODIPine (NORVASC) 10 MG tablet Take 10 mg by mouth once daily.      dicyclomine (BENTYL) 10 MG capsule Take 10 mg by mouth 3 (three) times daily as needed (IBS).      dicyclomine (BENTYL) 20 mg tablet Take 20 mg by mouth 3 (three) times daily.      ergocalciferol (ERGOCALCIFEROL) 50,000 unit Cap Take 50,000 Units by mouth every 7 days.      fluticasone propionate (FLONASE ALLERGY RELIEF) 50 mcg/actuation nasal spray 50 sprays by Nasal route daily as needed.      lisinopriL 10 MG tablet Take 10 mg by mouth.      LORazepam (ATIVAN) 0.5 MG tablet Take 0.5 mg by mouth 2 (two) times daily.  1    promethazine (PHENERGAN) 25 MG tablet Take 25 mg by mouth every 4 (four) hours as needed for Nausea (IBS).      zolpidem (AMBIEN) 10 mg Tab Take 10 mg by mouth nightly.       No current facility-administered medications for this visit.       Review of patient's allergies indicates:   Allergen Reactions    Hydromorphone Rash        Review of Systems  Review of Systems   Constitutional:  Negative for activity change, appetite change, chills, fatigue, fever and unexpected weight change.   HENT:  Negative for mouth sores.    Respiratory:  Negative for cough, shortness of breath and wheezing.    Cardiovascular:   Negative for chest pain and palpitations.   Gastrointestinal:  Negative for abdominal pain, bloating, blood in stool, constipation, nausea and vomiting.   Endocrine: Negative for diabetes and hot flashes.   Genitourinary:  Positive for menorrhagia, menstrual problem and vaginal discharge. Negative for dysmenorrhea, dyspareunia, dysuria, frequency, hematuria, pelvic pain, urgency, vaginal bleeding, vaginal pain, urinary incontinence, postcoital bleeding and vaginal odor.   Musculoskeletal:  Negative for back pain and myalgias.   Integumentary:  Negative for rash, breast mass and nipple discharge.   Neurological:  Negative for seizures and headaches.   Psychiatric/Behavioral:  Negative for depression and sleep disturbance. The patient is not nervous/anxious.    Breast: Negative for mass, mastodynia and nipple discharge          Objective:    Physical Exam:   Constitutional: She appears well-developed and well-nourished. No distress.   BMI of 53.22    HENT:   Head: Normocephalic and atraumatic.    Eyes: EOM are normal.      Pulmonary/Chest: Effort normal. No respiratory distress.   Breasts: Non-tender, no engorgement, no masses, no retraction, no discharge. Negative for lymphadenopathy.         Abdominal: Soft. She exhibits no distension. There is no abdominal tenderness. There is no rebound and no guarding.     Genitourinary:    Vagina and uterus normal.   No vaginal discharge in the vagina.    Genitourinary Comments: Vulva without any obvious lesions.  Urethral meatus normal size and location without any lesion.  Urethra is non-tender without stricture or discharge.  Bladder is non-tender.  Vaginal vault with good support.  Minimal white discharge noted.  No obvious lesion.  Normal rugation.  Cervix is without any cervical motion tenderness.  No obvious lesion.  Uterus is small, non-tender, normal contour.  Adnexa is without any masses or tenderness.  Perineum without obvious lesion.               Musculoskeletal:  Normal range of motion.       Neurological: She is alert.    Skin: Skin is warm and dry.    Psychiatric: She has a normal mood and affect.   Tense          Assessment:        1. Well woman exam with routine gynecological exam    2. Menorrhagia with regular cycle    3. PCOS (polycystic ovarian syndrome)    4. Hypertension, unspecified type    5. Morbid obesity with BMI of 50.0-59.9, adult    6. Vaginal discharge              Plan:          I have discussed with the patient her condition.  Monthly breast examination was instructed, discussed, and encouraged.  Patient was encouraged to consume a low-calorie, low fat diet, and to increase of physical activity.  Healthy habits encouraged.  A Pap smear was performed with HR-HPV according to the USPSTF recommendations.  Mammogram was not ordered because of the combination of her age and risk factors, according to ACOG guidelines.  Gonorrhea and Chlamydia testing performed;  HIV test offered, again according to guidelines.  Colonoscopy discussed according to ACS guideline.   Care Gaps addressed.  She will come back to see me in one year for her annual visit.  She can come back to see me sooner as necessary.  All of her questions were answered appropriately to her satisfaction.     We discussed her menses.  Pelvic ultrasound  Discussed medication options  CBC, CMP  Back for further discussion after ultrasound    Vaginosis screen per request

## 2024-01-21 LAB
BACTERIAL VAGINOSIS DNA: NEGATIVE
CANDIDA GLABRATA DNA: NEGATIVE
CANDIDA KRUSEI DNA: NEGATIVE
CANDIDA RRNA VAG QL PROBE: POSITIVE
T VAGINALIS RRNA GENITAL QL PROBE: NEGATIVE

## 2024-01-22 ENCOUNTER — PATIENT MESSAGE (OUTPATIENT)
Dept: OBSTETRICS AND GYNECOLOGY | Facility: CLINIC | Age: 34
End: 2024-01-22
Payer: MEDICAID

## 2024-01-22 DIAGNOSIS — B37.31 CANDIDA VAGINITIS: Primary | ICD-10-CM

## 2024-01-22 RX ORDER — FLUCONAZOLE 150 MG/1
150 TABLET ORAL DAILY
Qty: 1 TABLET | Refills: 0 | Status: SHIPPED | OUTPATIENT
Start: 2024-01-22 | End: 2024-01-23

## 2024-01-23 LAB
C TRACH DNA SPEC QL NAA+PROBE: NOT DETECTED
FINAL PATHOLOGIC DIAGNOSIS: NORMAL
Lab: NORMAL
N GONORRHOEA DNA SPEC QL NAA+PROBE: NOT DETECTED

## 2024-01-24 LAB
HPV HR 12 DNA SPEC QL NAA+PROBE: NEGATIVE
HPV16 AG SPEC QL: NEGATIVE
HPV18 DNA SPEC QL NAA+PROBE: NEGATIVE

## 2024-01-26 ENCOUNTER — PATIENT MESSAGE (OUTPATIENT)
Dept: OBSTETRICS AND GYNECOLOGY | Facility: CLINIC | Age: 34
End: 2024-01-26
Payer: MEDICAID

## 2024-03-06 ENCOUNTER — HOSPITAL ENCOUNTER (OUTPATIENT)
Dept: RADIOLOGY | Facility: HOSPITAL | Age: 34
Discharge: HOME | End: 2024-03-06
Attending: OBSTETRICS & GYNECOLOGY
Payer: COMMERCIAL

## 2024-03-06 ENCOUNTER — PATIENT MESSAGE (OUTPATIENT)
Dept: OBSTETRICS AND GYNECOLOGY | Facility: CLINIC | Age: 34
End: 2024-03-06
Payer: COMMERCIAL

## 2024-03-06 DIAGNOSIS — B37.31 CANDIDA VAGINITIS: Primary | ICD-10-CM

## 2024-03-06 DIAGNOSIS — E28.2 PCOS (POLYCYSTIC OVARIAN SYNDROME): ICD-10-CM

## 2024-03-06 DIAGNOSIS — N92.0 MENORRHAGIA WITH REGULAR CYCLE: ICD-10-CM

## 2024-03-06 PROCEDURE — 76830 TRANSVAGINAL US NON-OB: CPT | Mod: 26,,, | Performed by: RADIOLOGY

## 2024-03-06 PROCEDURE — 76856 US EXAM PELVIC COMPLETE: CPT | Mod: 26,,, | Performed by: RADIOLOGY

## 2024-03-06 PROCEDURE — 76830 TRANSVAGINAL US NON-OB: CPT | Mod: TC

## 2024-03-06 RX ORDER — FLUCONAZOLE 150 MG/1
150 TABLET ORAL
Qty: 2 TABLET | Refills: 0 | Status: SHIPPED | OUTPATIENT
Start: 2024-03-06 | End: 2024-03-10

## 2024-03-07 ENCOUNTER — TELEPHONE (OUTPATIENT)
Dept: OBSTETRICS AND GYNECOLOGY | Facility: CLINIC | Age: 34
End: 2024-03-07
Payer: COMMERCIAL

## 2024-03-07 NOTE — TELEPHONE ENCOUNTER
Pt call was returned. Pt will call to see what pharmacy can do a compound boric acid for her.          ----- Message from Patel Ngo MD sent at 3/7/2024 10:55 AM CST -----  Regarding: RE: Rosmery  She just needs to get it over-the-counter.  Unless she knows a compound pharmacy around here that would take insurance and still making it for her    Patel Ngo MD  ----- Message -----  From: Mony Porter MA  Sent: 3/7/2024  10:15 AM CST  To: Patel Ngo MD  Subject: FW: Rosmery                                         ----- Message -----  From: Bonita Sorto  Sent: 3/7/2024   9:05 AM CST  To: [facility] Staff  Subject: Rosmery                                           [facility]        What is the request in detail: Rosmery stated that boric acid (BORIC ACID) vaginal suppository need [facility]        Can the clinic reply by [facility]? No        Would the patient rather a call back or a response via [facility]? Call back       Best call back number:[phone]

## 2024-04-06 ENCOUNTER — HOSPITAL ENCOUNTER (EMERGENCY)
Facility: HOSPITAL | Age: 34
Discharge: HOME | End: 2024-04-06
Attending: EMERGENCY MEDICINE
Payer: COMMERCIAL

## 2024-04-06 VITALS
SYSTOLIC BLOOD PRESSURE: 187 MMHG | TEMPERATURE: 99 F | BODY MASS INDEX: 39.68 KG/M2 | WEIGHT: 293 LBS | OXYGEN SATURATION: 99 % | DIASTOLIC BLOOD PRESSURE: 102 MMHG | RESPIRATION RATE: 18 BRPM | HEART RATE: 83 BPM | HEIGHT: 72 IN

## 2024-04-06 DIAGNOSIS — H65.192 OTHER ACUTE NONSUPPURATIVE OTITIS MEDIA OF LEFT EAR, RECURRENCE NOT SPECIFIED: Primary | ICD-10-CM

## 2024-04-06 PROCEDURE — 99284 EMERGENCY DEPT VISIT MOD MDM: CPT

## 2024-04-06 PROCEDURE — 25000003 PHARM REV CODE 250

## 2024-04-06 RX ORDER — AMOXICILLIN 500 MG/1
500 TABLET, FILM COATED ORAL 2 TIMES DAILY
Qty: 14 TABLET | Refills: 0 | Status: SHIPPED | OUTPATIENT
Start: 2024-04-06 | End: 2024-04-13

## 2024-04-06 RX ORDER — FLUCONAZOLE 150 MG/1
150 TABLET ORAL DAILY
Qty: 1 TABLET | Refills: 0 | Status: SHIPPED | OUTPATIENT
Start: 2024-04-06 | End: 2024-04-07

## 2024-04-06 RX ORDER — CETIRIZINE HYDROCHLORIDE 10 MG/1
10 TABLET ORAL DAILY
Qty: 30 TABLET | Refills: 0 | Status: SHIPPED | OUTPATIENT
Start: 2024-04-06 | End: 2024-04-09 | Stop reason: HOSPADM

## 2024-04-06 RX ORDER — FLUTICASONE PROPIONATE 50 MCG
1 SPRAY, SUSPENSION (ML) NASAL 2 TIMES DAILY PRN
Qty: 15 G | Refills: 0 | OUTPATIENT
Start: 2024-04-06 | End: 2024-04-09

## 2024-04-06 RX ADMIN — Medication 5 DROP: at 04:04

## 2024-04-06 NOTE — ED PROVIDER NOTES
Encounter Date: 4/6/2024       History     Chief Complaint   Patient presents with    Otalgia     Pt reports left inner ear pain x3 days. Pt denies drainage or fevers.     Patient is a 33-year-old female with a past medical history of hypertension who presents to the emergency department for evaluation of left ear pain x 3 days.  Denies hearing loss.  Denies tinnitus.  Denies trauma or injury.  Denies drainage from ear.  Denies fever, chills.  Denies URI symptoms.  Denies headache, chest pain, shortness of breath, abdominal pain.    The history is provided by the patient.     Review of patient's allergies indicates:   Allergen Reactions    Hydromorphone Rash     Past Medical History:   Diagnosis Date    Anxiety     Back problem     Hypertension     IBS (irritable bowel syndrome)     Migraine headache     Obesity     TMJ (temporomandibular joint disorder)      Past Surgical History:   Procedure Laterality Date    CYSTOURETEROSCOPY WITH RETROGRADE PYELOGRAPHY AND INSERTION OF STENT INTO URETER  10/8/2019    Procedure: CYSTOURETEROSCOPY, WITH RETROGRADE PYELOGRAM AND URETERAL STENT INSERTION;  Surgeon: Kristy Ahmadi MD;  Location: [facility] OR;  Service: Urology;;    URETEROSCOPIC REMOVAL OF URETERIC CALCULUS Left 10/8/2019    Procedure: Cystoscopy, possible retrograde pyelogram, ureteroscopy with laser lithotripsy, placement of ureteral stent;  Surgeon: Kristy Ahmadi MD;  Location: [facility] OR;  Service: Urology;  Laterality: Left;  RN PREOP 10/7/2019     Family History   Problem Relation Age of Onset    Heart attack Paternal Grandfather     Cancer Paternal Grandmother 50        Pancreatic cancer    Emphysema Maternal Grandfather     Breast cancer Neg Hx     Colon cancer Neg Hx     Ovarian cancer Neg Hx      Social History     Tobacco Use    Smoking status: Never    Smokeless tobacco: Never   Substance Use Topics    Alcohol use: No     Comment: socially    Drug use: No     Review of Systems   Constitutional:   Negative for chills and fever.   HENT:  Positive for ear pain. Negative for congestion, ear discharge, hearing loss, rhinorrhea, sore throat, tinnitus and trouble swallowing.    Respiratory:  Negative for shortness of breath.    Cardiovascular:  Negative for chest pain.   Gastrointestinal:  Negative for abdominal pain, nausea and vomiting.   Musculoskeletal:  Negative for neck pain and neck stiffness.   Neurological:  Negative for dizziness, light-headedness, numbness and headaches.       Physical Exam     Initial Vitals [04/06/24 1556]   BP Pulse Resp Temp SpO2   (!) 187/102 83 18 98.5 °F (36.9 °C) 99 %      MAP       --         Physical Exam    Nursing note and vitals reviewed.  Constitutional: She appears well-developed and well-nourished.   HENT:   Head: Normocephalic and atraumatic.   Right Ear: External ear normal.   Left Ear: External ear normal.   There is no posterior oropharyngeal erythema but a cobblestoning oropharynx with postnasal drip present, no tonsillar swelling, no oropharyngeal exudates, uvula is midline. Normal dentition. No trismus.  No muffled voice. No submandibular swelling. Patient is tolerating secretions without difficulty.  Patient is speaking in full sentences on exam without difficulty.  Right tympanic membrane is pearly gray without erythema, bulging, perforation.  I am not able to visualize the left tympanic membrane due to cerumen buildup.  There is no postauricular swelling, or overlying erythema or tenderness to palpation over mastoids bilaterally.    Neck: Carotid bruit is not present.   Normal range of motion.  Cardiovascular:  Normal rate, regular rhythm, normal heart sounds and intact distal pulses.     Exam reveals no gallop and no friction rub.       No murmur heard.  Pulmonary/Chest: Breath sounds normal. No respiratory distress. She has no wheezes. She has no rhonchi. She has no rales.   Abdominal: Abdomen is soft. Bowel sounds are normal. She exhibits no distension. There  is no abdominal tenderness. There is no rebound and no guarding.   Musculoskeletal:         General: Normal range of motion.      Cervical back: Normal range of motion.     Neurological: She is alert and oriented to person, place, and time. GCS score is 15. GCS eye subscore is 4. GCS verbal subscore is 5. GCS motor subscore is 6.   Psychiatric: She has a normal mood and affect.         ED Course   Procedures  Labs Reviewed - No data to display       Imaging Results    None          Medications   carbamide peroxide 6.5 % otic solution 5 drop (5 drops Left Ear Given 4/6/24 5627)     Medical Decision Making  This is an emergent evaluation of a 33-year-old female with a past medical history of hypertension who presents to the emergency department for evaluation of left ear pain x 3 days.  Denies hearing loss.  Denies tinnitus.  Denies trauma or injury.  Denies drainage from ear.  Denies fever, chills.  Denies URI symptoms.  Denies headache, chest pain, shortness of breath, abdominal pain.    Patient looks well. There is no posterior oropharyngeal erythema but a cobblestoning oropharynx with postnasal drip present, no tonsillar swelling, no oropharyngeal exudates, uvula is midline. Normal dentition. No trismus.  No muffled voice. No submandibular swelling. Patient is tolerating secretions without difficulty.  Patient is speaking in full sentences on exam without difficulty.  Right tympanic membrane is pearly gray without erythema, bulging, perforation.  I am not able to visualize the left tympanic membrane due to cerumen buildup.  There is no postauricular swelling, or overlying erythema or tenderness to palpation over mastoids bilaterally. Regular rate rhythm without murmurs.  No carotid bruits appreciated on exam. Lungs are clear to auscultation bilaterally.  Abdomen is soft, nontender, non distended, with normal bowel sounds.     Differential diagnosis includes but is not limited to otitis media, serous otitis media,  otitis externa.  Considered mastoiditis but doubtful given no postauricular swelling erythema, no protrusion of the auricle, no tenderness.    Ordered ear irrigation by nursing staff.  Ordered Debrox. Ear wax was removed by nursing staff.  Otoscopic exam reveals bulging and erythematous left tympanic membrane without perforation.  My overall impression is otitis media.  Will discharge home with amoxicillin, Debrox, Flonase, Zyrtec. Patient also requesting diflucan, will send into pharmacy. Patient is hypertensive at 187/102.  She denies headache, chest pain, shortness on breath.  She will continue to monitor her blood pressure at home and follow up with your primary care provider for further management.  She does report history of white coat hypertension and states she got anxious when nurse was talking to her in triage. Patient is very well appearing, and in no acute distress. Vital signs are reassuring here in the emergency department, patient is afebrile, breathing comfortable, satting 99 % on room air. Patient/Caregiver is stable for discharge at this time. Patient/Caregiver verbalize understanding of care plan. All questions and concerns were addressed. Discussed strict return precautions with the patient/caregiver. Instructed follow up with primary care provider within 1 week.      Cyrus Schwab PA-C    DISCLAIMER: This note was prepared with [employer] voice recognition transcription software. Garbled syntax, mangled pronouns, and other bizarre constructions may be attributed to that software system.     Risk  OTC drugs.  Prescription drug management.                                      Clinical Impression:  Final diagnoses:  [H65.192] Other acute nonsuppurative otitis media of left ear, recurrence not specified (Primary)          ED Disposition Condition    Discharge Stable          ED Prescriptions       Medication Sig Dispense Start Date End Date Auth. Provider    amoxicillin (AMOXIL) 500 MG Tab Take 1  tablet (500 mg total) by mouth 2 (two) times a day. for 7 days 14 tablet 4/6/2024 4/13/2024 Cyrus Schwab PA-C    carbamide peroxide (DEBROX) 6.5 % otic solution Place 5 drops into both ears as needed. 15 mL 4/6/2024 -- Cyrus Schwab PA-C    cetirizine (ZYRTEC) 10 MG tablet Take 1 tablet (10 mg total) by mouth once daily. 30 tablet 4/6/2024 5/6/2024 Cyrus Schwab PA-C    fluticasone propionate (FLONASE) 50 mcg/actuation nasal spray 1 spray (50 mcg total) by Each Nostril route 2 (two) times daily as needed for Rhinitis. 15 g 4/6/2024 -- Cyrus Schwab PA-C    fluconazole (DIFLUCAN) 150 MG Tab Take 1 tablet (150 mg total) by mouth once daily. for 1 day 1 tablet 4/6/2024 4/7/2024 Cyrus Schwab PA-C          Follow-up Information       Follow up With Specialties Details Why Contact Info    Jose Dupont Jr., MD Family Medicine   [address] [facility] [address]  [phone]      [facility] [place] - Emergency Dept Emergency Medicine Go to  As needed, If symptoms worsen, or new symptoms develop [address] [facility] [place] [id number] [phone]             Cyrus Schwab PA-C  04/06/24 1340

## 2024-04-06 NOTE — DISCHARGE INSTRUCTIONS
You were seen in the emergency department today and diagnosed with a left ear infection.  Please take all medications as prescribed for symptoms.  Follow up with your primary care provider.  Return for any new or worsening symptoms.  Thank you for allowing me to take care of you today.  Feel better soon.

## 2024-04-09 ENCOUNTER — HOSPITAL ENCOUNTER (EMERGENCY)
Facility: HOSPITAL | Age: 34
Discharge: HOME | End: 2024-04-09
Payer: COMMERCIAL

## 2024-04-09 VITALS
BODY MASS INDEX: 39.68 KG/M2 | HEART RATE: 100 BPM | OXYGEN SATURATION: 98 % | SYSTOLIC BLOOD PRESSURE: 172 MMHG | DIASTOLIC BLOOD PRESSURE: 91 MMHG | WEIGHT: 293 LBS | RESPIRATION RATE: 19 BRPM | TEMPERATURE: 99 F | HEIGHT: 72 IN

## 2024-04-09 DIAGNOSIS — H65.92 FLUID LEVEL BEHIND TYMPANIC MEMBRANE OF LEFT EAR: Primary | ICD-10-CM

## 2024-04-09 DIAGNOSIS — M26.622 ARTHRALGIA OF LEFT TEMPOROMANDIBULAR JOINT: ICD-10-CM

## 2024-04-09 LAB
B-HCG UR QL: NEGATIVE
CTP QC/QA: YES

## 2024-04-09 PROCEDURE — 81025 URINE PREGNANCY TEST: CPT | Performed by: PHYSICIAN ASSISTANT

## 2024-04-09 PROCEDURE — 99284 EMERGENCY DEPT VISIT MOD MDM: CPT

## 2024-04-09 RX ORDER — FEXOFENADINE HCL 60 MG
60 TABLET ORAL 2 TIMES DAILY
Qty: 30 TABLET | Refills: 0 | Status: SHIPPED | OUTPATIENT
Start: 2024-04-09 | End: 2024-04-25

## 2024-04-09 RX ORDER — MOMETASONE FUROATE 50 UG/1
2 SPRAY, METERED NASAL DAILY
Qty: 17 G | Refills: 0 | Status: SHIPPED | OUTPATIENT
Start: 2024-04-09 | End: 2024-05-09

## 2024-04-09 RX ORDER — KETOROLAC TROMETHAMINE 30 MG/ML
30 INJECTION, SOLUTION INTRAMUSCULAR; INTRAVENOUS
Status: DISCONTINUED | OUTPATIENT
Start: 2024-04-09 | End: 2024-04-09 | Stop reason: HOSPADM

## 2024-04-09 RX ORDER — IBUPROFEN 600 MG/1
600 TABLET ORAL EVERY 6 HOURS PRN
Qty: 20 TABLET | Refills: 0 | Status: SHIPPED | OUTPATIENT
Start: 2024-04-09 | End: 2024-04-14

## 2024-04-09 RX ORDER — ACETAMINOPHEN 500 MG
500 TABLET ORAL EVERY 4 HOURS PRN
Qty: 20 TABLET | Refills: 0 | Status: SHIPPED | OUTPATIENT
Start: 2024-04-09 | End: 2024-04-14

## 2024-04-09 NOTE — DISCHARGE INSTRUCTIONS

## 2024-04-10 NOTE — ED PROVIDER NOTES
"Encounter Date: 4/9/2024       History     Chief Complaint   Patient presents with    Otalgia     LEFT ear. Was seen here on 4/6, given amoxicillin. States saw ENT yesterday "they said nothing was wrong. They said it was TMJ and gave me muscle relaxers. But I didn't want to take them if that's not it".      Chief complaint:  Otalgia    HPI:    33-year-old female history of hypertension, IBS, anxiety, migraine headache, TMJ disorder presenting for evaluation of left-sided jaw pain and ear pain.  Patient reports that she was evaluated here on 04/06 and was prescribed amoxicillin.  She saw ENT yesterday and she states that they diagnosed her with TMJ and prescribe her muscle relaxers which she has not begun taking.  She states that she feels left ear pain and fullness.  She has been taking Zyrtec and Flonase as prescribed but states she does not feel this is relieving her ear fullness.  Denies any ear drainage, fever, chest pain shortness breath, dizziness or other symptoms.  Denies dental pain    The history is provided by the patient.     Review of patient's allergies indicates:   Allergen Reactions    Hydromorphone Rash     Past Medical History:   Diagnosis Date    Anxiety     Back problem     Hypertension     IBS (irritable bowel syndrome)     Migraine headache     Obesity     TMJ (temporomandibular joint disorder)      Past Surgical History:   Procedure Laterality Date    CYSTOURETEROSCOPY WITH RETROGRADE PYELOGRAPHY AND INSERTION OF STENT INTO URETER  10/8/2019    Procedure: CYSTOURETEROSCOPY, WITH RETROGRADE PYELOGRAM AND URETERAL STENT INSERTION;  Surgeon: Kristy Ahmadi MD;  Location: [facility] OR;  Service: Urology;;    URETEROSCOPIC REMOVAL OF URETERIC CALCULUS Left 10/8/2019    Procedure: Cystoscopy, possible retrograde pyelogram, ureteroscopy with laser lithotripsy, placement of ureteral stent;  Surgeon: Kristy Ahmadi MD;  Location: [facility] OR;  Service: Urology;  Laterality: Left;  RN PREOP " 10/7/2019     Family History   Problem Relation Age of Onset    Heart attack Paternal Grandfather     Cancer Paternal Grandmother 50        Pancreatic cancer    Emphysema Maternal Grandfather     Breast cancer Neg Hx     Colon cancer Neg Hx     Ovarian cancer Neg Hx      Social History     Tobacco Use    Smoking status: Never    Smokeless tobacco: Never   Substance Use Topics    Alcohol use: No     Comment: socially    Drug use: No     Review of Systems   Constitutional:  Negative for chills and fever.   HENT:  Positive for ear pain. Negative for congestion, nosebleeds, rhinorrhea, sore throat and trouble swallowing.         Jaw pain   Eyes:  Negative for redness.   Respiratory:  Negative for cough, shortness of breath and stridor.    Cardiovascular:  Negative for chest pain.   Gastrointestinal:  Negative for abdominal pain, constipation, diarrhea, nausea and vomiting.   Genitourinary:  Negative for decreased urine volume, dysuria, frequency, hematuria and urgency.   Musculoskeletal:  Negative for back pain and neck pain.   Skin:  Negative for rash and wound.   Neurological:  Negative for dizziness, speech difficulty, weakness, light-headedness, numbness and headaches.   Hematological:  Does not bruise/bleed easily.   Psychiatric/Behavioral:  Negative for confusion.        Physical Exam     Initial Vitals [04/09/24 1459]   BP Pulse Resp Temp SpO2   (!) 172/91 100 19 98.7 °F (37.1 °C) 98 %      MAP       --         Physical Exam    Nursing note and vitals reviewed.  Constitutional: She appears well-developed and well-nourished. No distress.   HENT:   Head: Normocephalic.   Right Ear: Hearing, tympanic membrane, external ear and ear canal normal. Tympanic membrane is not erythematous. No middle ear effusion.   Left Ear: Hearing, external ear and ear canal normal. Tympanic membrane is not erythematous. A middle ear effusion is present.   Tenderness over the left TMJ.  No trismus.  No mastoid or tragal tenderness.    Eyes: Conjunctivae are normal. Right eye exhibits no discharge. Left eye exhibits no discharge. No scleral icterus.   Neck: No tracheal deviation present.   Pulmonary/Chest: No stridor. No respiratory distress.   Musculoskeletal:         General: Normal range of motion.     Neurological: She is alert.   Skin: Skin is warm and dry. No rash noted. No erythema.   Psychiatric: She has a normal mood and affect. Her behavior is normal. Judgment and thought content normal.         ED Course   Procedures  Labs Reviewed   POCT URINE PREGNANCY          Imaging Results    None          Medications - No data to display  Medical Decision Making  33-year-old female presenting for evaluation of left jaw pain.  Exam above.  Appears to be consistent with TMJ.  She saw ENT yesterday who diagnosed with TMJ.  She has not taking muscle relaxers as prescribed.  Will add ibuprofen and Tylenol to her muscle relaxers.  She does have middle ear effusion.  Will change Zyrtec and Flonase to Allegra and Nasonex.  Will have her follow-up with ENT or/primary care for further evaluation management.  Will have her return ER for worsening or as needed.      Amount and/or Complexity of Data Reviewed  Labs: ordered.    Risk  OTC drugs.  Prescription drug management.                                      Clinical Impression:  Final diagnoses:  [H65.92] Fluid level behind tympanic membrane of left ear (Primary)  [M26.622] Arthralgia of left temporomandibular joint          ED Disposition Condition    Discharge           ED Prescriptions       Medication Sig Dispense Start Date End Date Auth. Provider    acetaminophen (TYLENOL) 500 MG tablet Take 1 tablet (500 mg total) by mouth every 4 (four) hours as needed. 20 tablet 4/9/2024 4/14/2024 Deb Strange PA-C    ibuprofen (ADVIL,MOTRIN) 600 MG tablet Take 1 tablet (600 mg total) by mouth every 6 (six) hours as needed. 20 tablet 4/9/2024 4/14/2024 Deb Strange PA-C    fexofenadine  (ALLEGRA) 60 MG tablet Take 1 tablet (60 mg total) by mouth 2 (two) times daily. for 15 days 30 tablet 4/9/2024 4/24/2024 Deb Strange PA-C    mometasone (NASONEX) 50 mcg/actuation nasal spray 2 sprays by Nasal route once daily. 17 g 4/9/2024 5/9/2024 Deb Strange PA-C          Follow-up Information    None          Deb Strange PA-C  04/09/24 2042

## 2024-04-25 ENCOUNTER — OFFICE VISIT (OUTPATIENT)
Dept: OBSTETRICS AND GYNECOLOGY | Facility: CLINIC | Age: 34
End: 2024-04-25
Payer: COMMERCIAL

## 2024-04-25 VITALS
WEIGHT: 293 LBS | SYSTOLIC BLOOD PRESSURE: 138 MMHG | BODY MASS INDEX: 39.68 KG/M2 | DIASTOLIC BLOOD PRESSURE: 74 MMHG | HEIGHT: 72 IN

## 2024-04-25 DIAGNOSIS — E66.01 MORBID OBESITY WITH BMI OF 50.0-59.9, ADULT: ICD-10-CM

## 2024-04-25 DIAGNOSIS — Z31.69 ENCOUNTER FOR PRECONCEPTION CONSULTATION: Primary | ICD-10-CM

## 2024-04-25 DIAGNOSIS — E28.2 PCOS (POLYCYSTIC OVARIAN SYNDROME): ICD-10-CM

## 2024-04-25 DIAGNOSIS — I10 HYPERTENSION, UNSPECIFIED TYPE: ICD-10-CM

## 2024-04-25 PROCEDURE — 99213 OFFICE O/P EST LOW 20 MIN: CPT | Mod: S$GLB,,, | Performed by: OBSTETRICS & GYNECOLOGY

## 2024-04-25 PROCEDURE — 99999 PR PBB SHADOW E&M-EST. PATIENT-LVL IV: CPT | Mod: PBBFAC,,, | Performed by: OBSTETRICS & GYNECOLOGY

## 2024-04-25 PROCEDURE — 3078F DIAST BP <80 MM HG: CPT | Mod: CPTII,S$GLB,, | Performed by: OBSTETRICS & GYNECOLOGY

## 2024-04-25 PROCEDURE — 3075F SYST BP GE 130 - 139MM HG: CPT | Mod: CPTII,S$GLB,, | Performed by: OBSTETRICS & GYNECOLOGY

## 2024-04-25 PROCEDURE — 3008F BODY MASS INDEX DOCD: CPT | Mod: CPTII,S$GLB,, | Performed by: OBSTETRICS & GYNECOLOGY

## 2024-04-25 PROCEDURE — 1159F MED LIST DOCD IN RCRD: CPT | Mod: CPTII,S$GLB,, | Performed by: OBSTETRICS & GYNECOLOGY

## 2024-04-25 PROCEDURE — 1160F RVW MEDS BY RX/DR IN RCRD: CPT | Mod: CPTII,S$GLB,, | Performed by: OBSTETRICS & GYNECOLOGY

## 2024-04-25 PROCEDURE — 4010F ACE/ARB THERAPY RXD/TAKEN: CPT | Mod: CPTII,S$GLB,, | Performed by: OBSTETRICS & GYNECOLOGY

## 2024-04-25 RX ORDER — ESTRADIOL 1 MG/1
3 TABLET ORAL 2 TIMES DAILY
Qty: 27 TABLET | Refills: 3 | Status: SHIPPED | OUTPATIENT
Start: 2024-04-25

## 2024-04-25 RX ORDER — PREDNISONE 10 MG/1
10 TABLET ORAL 3 TIMES DAILY
COMMUNITY
Start: 2024-04-24

## 2024-04-25 RX ORDER — CLOMIPHENE CITRATE 50 MG/1
50 TABLET ORAL DAILY
Qty: 5 TABLET | Refills: 3 | Status: SHIPPED | OUTPATIENT
Start: 2024-04-25

## 2024-04-25 RX ORDER — ALPRAZOLAM 0.25 MG/1
TABLET ORAL
COMMUNITY
Start: 2024-04-02

## 2024-04-25 RX ORDER — METFORMIN HYDROCHLORIDE 500 MG/1
500 TABLET, EXTENDED RELEASE ORAL
Qty: 30 TABLET | Refills: 3 | Status: SHIPPED | OUTPATIENT
Start: 2024-04-25

## 2024-04-25 RX ORDER — LISINOPRIL 20 MG/1
20 TABLET ORAL
COMMUNITY
Start: 2024-04-02

## 2024-04-25 RX ORDER — ACETIC ACID 20.65 MG/ML
SOLUTION AURICULAR (OTIC)
COMMUNITY
Start: 2024-04-24

## 2024-04-25 NOTE — PROGRESS NOTES
Subjective     Patient ID: Elena Valencia is a 33 y.o. female.    Chief Complaint:  Follow-up (Follow up ultrasound result- fibroids)      History of Present Illness  HPI  Patient comes in today for follow-up, again requesting help to get pregnant  History of PCOS  Status post spont Ab in 2018  Has been trying ever since    Morbid obesity  Chronic hypertension   Pelvic ultrasound on 24 with uterus at 9.4 x 5.8 x 6.0 cm.  Two fibroids at 4.8 cm and 2.6 cm    Does not want surgery at this time  Has been working with PCP to lose weight  ?Starting Monjaro soon.  But it is too expensive; she has a difficult time paying      GYN & OB History  Patient's last menstrual period was 2024 (exact date).   Date of Last Pap: 2024    OB History    Para Term  AB Living   1             SAB IAB Ectopic Multiple Live Births                  # Outcome Date GA Lbr Leonardo/2nd Weight Sex Type Anes PTL Lv   1               Past Medical History:   Diagnosis Date    Anxiety     Back problem     Hypertension     IBS (irritable bowel syndrome)     Migraine headache     Obesity     TMJ (temporomandibular joint disorder)        Past Surgical History:   Procedure Laterality Date    CYSTOURETEROSCOPY WITH RETROGRADE PYELOGRAPHY AND INSERTION OF STENT INTO URETER  10/8/2019    Procedure: CYSTOURETEROSCOPY, WITH RETROGRADE PYELOGRAM AND URETERAL STENT INSERTION;  Surgeon: Kristy Ahmadi MD;  Location: [facility] OR;  Service: Urology;;    URETEROSCOPIC REMOVAL OF URETERIC CALCULUS Left 10/8/2019    Procedure: Cystoscopy, possible retrograde pyelogram, ureteroscopy with laser lithotripsy, placement of ureteral stent;  Surgeon: Kristy Ahmadi MD;  Location: [facility] OR;  Service: Urology;  Laterality: Left;  RN PREOP 10/7/2019       Family History   Problem Relation Name Age of Onset    Heart attack Paternal Grandfather      Cancer Paternal Grandmother  50        Pancreatic cancer    Emphysema Maternal  Grandfather      Breast cancer Neg Hx      Colon cancer Neg Hx      Ovarian cancer Neg Hx         Social History     Socioeconomic History    Marital status: Single   Tobacco Use    Smoking status: Never    Smokeless tobacco: Never   Substance and Sexual Activity    Alcohol use: No     Comment: socially    Drug use: No    Sexual activity: Yes     Partners: Male     Birth control/protection: Condom   Social History Narrative    Together since 2010    He drives trucks    She is a      Social Determinants of Health     Financial Resource Strain: Medium Risk (4/25/2024)    Overall Financial Resource Strain (CARDIA)     Difficulty of Paying Living Expenses: Somewhat hard   Food Insecurity: No Food Insecurity (4/25/2024)    Hunger Vital Sign     Worried About Running Out of Food in the Last Year: Never true     Ran Out of Food in the Last Year: Never true   Transportation Needs: No Transportation Needs (4/25/2024)    PRAPARE - Transportation     Lack of Transportation (Medical): No     Lack of Transportation (Non-Medical): No   Physical Activity: Inactive (4/25/2024)    Exercise Vital Sign     Days of Exercise per Week: 0 days     Minutes of Exercise per Session: 10 min   Stress: No Stress Concern Present (4/25/2024)    [employer] - Occupational Stress Questionnaire     Feeling of Stress : Only a little   Social Connections: Unknown (4/25/2024)    Social Connection and Isolation Panel [NHANES]     Frequency of Communication with Friends and Family: More than three times a week     Active Member of Clubs or Organizations: No     Attends Club or Organization Meetings: Never     Marital Status: Living with partner   Housing Stability: Unknown (4/25/2024)    Housing Stability Vital Sign     Unable to Pay for Housing in the Last Year: No       Current Outpatient Medications   Medication Sig Dispense Refill    ALPRAZolam (XANAX) 0.25 MG tablet Take by mouth.      amLODIPine (NORVASC) 10 MG  tablet Take 10 mg by mouth once daily.      boric acid (BORIC ACID) vaginal suppository Place 1 each (650 mg total) vaginally every evening. 30 suppository 2    carbamide peroxide (DEBROX) 6.5 % otic solution Place 5 drops into both ears as needed. 15 mL 0    dicyclomine (BENTYL) 10 MG capsule Take 10 mg by mouth 3 (three) times daily as needed (IBS).      dicyclomine (BENTYL) 20 mg tablet Take 20 mg by mouth 3 (three) times daily.      ergocalciferol (ERGOCALCIFEROL) 50,000 unit Cap Take 50,000 Units by mouth every 7 days.      lisinopriL (PRINIVIL,ZESTRIL) 20 MG tablet Take 20 mg by mouth.      LORazepam (ATIVAN) 0.5 MG tablet Take 0.5 mg by mouth 2 (two) times daily.  1    mometasone (NASONEX) 50 mcg/actuation nasal spray 2 sprays by Nasal route once daily. 17 g 0    promethazine (PHENERGAN) 25 MG tablet Take 25 mg by mouth every 4 (four) hours as needed for Nausea (IBS).      zolpidem (AMBIEN) 10 mg Tab Take 10 mg by mouth nightly.      acetic acid (VOSOL) 2 % otic solution SMARTSIG:3 Drop(s) In Eye(s) Twice Daily (Patient not taking: Reported on 4/25/2024)      clomiPHENE (CLOMID) 50 mg tablet Take 1 tablet (50 mg total) by mouth once daily. 5 tablet 3    estradioL (ESTRACE) 1 MG tablet Take 3 tablets (3 mg total) by mouth 2 (two) times daily. 27 tablet 3    metFORMIN (GLUCOPHAGE-XR) 500 MG ER 24hr tablet Take 1 tablet (500 mg total) by mouth daily with breakfast. 30 tablet 3    predniSONE (DELTASONE) 10 MG tablet Take 10 mg by mouth 3 (three) times daily. (Patient not taking: Reported on 4/25/2024)       No current facility-administered medications for this visit.       Review of patient's allergies indicates:   Allergen Reactions    Hydromorphone Rash       Review of Systems  Review of Systems   Constitutional:  Negative for activity change, appetite change, chills, fatigue, fever and unexpected weight change.   HENT:  Negative for mouth sores.    Respiratory:  Negative for cough, shortness of breath and  wheezing.    Cardiovascular:  Negative for chest pain and palpitations.   Gastrointestinal:  Negative for abdominal pain, bloating, blood in stool, constipation, nausea and vomiting.   Endocrine: Negative for diabetes and hot flashes.   Genitourinary:  Positive for menorrhagia, menstrual problem and vaginal discharge. Negative for dysmenorrhea, dyspareunia, dysuria, frequency, hematuria, pelvic pain, urgency, vaginal bleeding, vaginal pain, urinary incontinence, postcoital bleeding and vaginal odor.   Musculoskeletal:  Negative for back pain and myalgias.   Integumentary:  Negative for rash, breast mass and nipple discharge.   Neurological:  Negative for seizures and headaches.   Psychiatric/Behavioral:  Negative for depression and sleep disturbance. The patient is not nervous/anxious.    Breast: Negative for mass, mastodynia and nipple discharge         Objective   Physical Exam:   Constitutional: She appears well-developed and well-nourished. No distress.    HENT:   Head: Normocephalic and atraumatic.    Eyes: EOM are normal.     Cardiovascular:  Normal rate.             Pulmonary/Chest: Effort normal. No respiratory distress.        Abdominal: Soft. She exhibits no distension. There is no abdominal tenderness. There is no rebound and no guarding.             Musculoskeletal: Normal range of motion.       Neurological: She is alert.    Skin: Skin is warm and dry.    Psychiatric: She has a normal mood and affect.            Assessment and Plan     1. Encounter for preconception consultation    2. PCOS (polycystic ovarian syndrome)    3. Morbid obesity with BMI of 50.0-59.9, adult    4. Hypertension, unspecified type           Plan:  I have discussed with the patient regarding her condition.  We discussed weight loss and obesity and pregnancy  We discussed CHTN and pregnancy  Risks and benefits of Monjaro, and clomiphene discussed  Patient insisted on starting on CC  Prescriptions given with specific  instructions  Metformin and prenatal vitamins  Needs to continue with amlodipine and stop lisinopril  Back to PCP for weight loss    Back with a positive UPT

## 2024-04-25 NOTE — PATIENT INSTRUCTIONS
Continue with prenatal vitamins and metformin  Continue to lose weight    Start clomiphene on cycle day #3. Take it daily for 5 days to cycle day #7  Take estradiol 3 mg twice a day, beginning on cycle day #8 to the morning of cycle day #12  Home urine pregnancy test on cycle day #28    Back in 3 months

## 2024-07-19 ENCOUNTER — ON-DEMAND VIRTUAL (OUTPATIENT)
Dept: URGENT CARE | Facility: CLINIC | Age: 34
End: 2024-07-19
Payer: MEDICAID

## 2024-07-19 DIAGNOSIS — B37.31 CANDIDA VAGINITIS: Primary | ICD-10-CM

## 2024-07-19 PROCEDURE — 99212 OFFICE O/P EST SF 10 MIN: CPT | Mod: 95,,, | Performed by: INTERNAL MEDICINE

## 2024-07-19 RX ORDER — FLUCONAZOLE 150 MG/1
150 TABLET ORAL DAILY
Qty: 1 TABLET | Refills: 0 | Status: SHIPPED | OUTPATIENT
Start: 2024-07-19 | End: 2024-07-20

## 2024-07-19 NOTE — PROGRESS NOTES
Subjective:      Patient ID: Elena Valencia is a 33 y.o. female.    Vitals:  vitals were not taken for this visit.     Chief Complaint: Vaginitis      Visit Type: TELE AUDIOVISUAL    Present with the patient at the time of consultation: TELEMED PRESENT WITH PATIENT: None    Past Medical History:   Diagnosis Date    Anxiety     Back problem     Hypertension     IBS (irritable bowel syndrome)     Migraine headache     Obesity     TMJ (temporomandibular joint disorder)      Past Surgical History:   Procedure Laterality Date    CYSTOURETEROSCOPY WITH RETROGRADE PYELOGRAPHY AND INSERTION OF STENT INTO URETER  10/8/2019    Procedure: CYSTOURETEROSCOPY, WITH RETROGRADE PYELOGRAM AND URETERAL STENT INSERTION;  Surgeon: Kristy Ahmadi MD;  Location: [facility] OR;  Service: Urology;;    URETEROSCOPIC REMOVAL OF URETERIC CALCULUS Left 10/8/2019    Procedure: Cystoscopy, possible retrograde pyelogram, ureteroscopy with laser lithotripsy, placement of ureteral stent;  Surgeon: Kristy Ahmadi MD;  Location: [facility] OR;  Service: Urology;  Laterality: Left;  RN PREOP 10/7/2019     Review of patient's allergies indicates:   Allergen Reactions    Hydromorphone Rash     Current Outpatient Medications on File Prior to Visit   Medication Sig Dispense Refill    acetic acid (VOSOL) 2 % otic solution SMARTSIG:3 Drop(s) In Eye(s) Twice Daily (Patient not taking: Reported on 4/25/2024)      ALPRAZolam (XANAX) 0.25 MG tablet Take by mouth.      amLODIPine (NORVASC) 10 MG tablet Take 10 mg by mouth once daily.      boric acid (BORIC ACID) vaginal suppository Place 1 each (650 mg total) vaginally every evening. 30 suppository 2    carbamide peroxide (DEBROX) 6.5 % otic solution Place 5 drops into both ears as needed. 15 mL 0    clomiPHENE (CLOMID) 50 mg tablet Take 1 tablet (50 mg total) by mouth once daily. 5 tablet 3    dicyclomine (BENTYL) 10 MG capsule Take 10 mg by mouth 3 (three) times daily as needed (IBS).      dicyclomine  (BENTYL) 20 mg tablet Take 20 mg by mouth 3 (three) times daily.      ergocalciferol (ERGOCALCIFEROL) 50,000 unit Cap Take 50,000 Units by mouth every 7 days.      estradioL (ESTRACE) 1 MG tablet Take 3 tablets (3 mg total) by mouth 2 (two) times daily. 27 tablet 3    lisinopriL (PRINIVIL,ZESTRIL) 20 MG tablet Take 20 mg by mouth.      LORazepam (ATIVAN) 0.5 MG tablet Take 0.5 mg by mouth 2 (two) times daily.  1    metFORMIN (GLUCOPHAGE-XR) 500 MG ER 24hr tablet Take 1 tablet (500 mg total) by mouth daily with breakfast. 30 tablet 3    predniSONE (DELTASONE) 10 MG tablet Take 10 mg by mouth 3 (three) times daily. (Patient not taking: Reported on 4/25/2024)      promethazine (PHENERGAN) 25 MG tablet Take 25 mg by mouth every 4 (four) hours as needed for Nausea (IBS).      zolpidem (AMBIEN) 10 mg Tab Take 10 mg by mouth nightly.       No current facility-administered medications on file prior to visit.     Family History   Problem Relation Name Age of Onset    Heart attack Paternal Grandfather      Cancer Paternal Grandmother  50        Pancreatic cancer    Emphysema Maternal Grandfather      Breast cancer Neg Hx      Colon cancer Neg Hx      Ovarian cancer Neg Hx         Medications Ordered                [facility]/pharmacy #[id number] - [place] - [address]   [address] [address]    Telephone: [phone]   Fax: [phone]   Hours: Not open 24 hours                         E-Prescribed (1 of 1)              fluconazole (DIFLUCAN) 150 MG Tab    Sig: Take 1 tablet (150 mg total) by mouth once daily. for 1 day       Start: 7/19/24     Quantity: 1 tablet Refills: 0                           Ohs Peq Odvv Intake    7/19/2024 12:00 PM CDT - Filed by Patient   What is your current physical address in the event of a medical emergency? [address] [place] [address]   Are you able to take your vital signs? No   Please attach any relevant images or files          In [place] via video  conference in her home.     34 y/o woman with a 1 day history of vaginal itching consistent with a yeast infection. No fever.      ROS     Objective:   The physical exam was conducted virtually.  Physical Exam   Constitutional: No distress.   HENT:   Nose: No congestion.   Pulmonary/Chest: Effort normal.   Neurological: She is alert.   No other pertinent findings on examination.     Assessment:     1. Candida vaginitis        Plan:       Candida vaginitis    Other orders  -     fluconazole (DIFLUCAN) 150 MG Tab; Take 1 tablet (150 mg total) by mouth once daily. for 1 day  Dispense: 1 tablet; Refill: 0      If no better in a day or two or if you worsen acutely go in for an in person visit.

## 2025-04-11 ENCOUNTER — HOSPITAL ENCOUNTER (EMERGENCY)
Facility: HOSPITAL | Age: 35
Discharge: HOME | End: 2025-04-11
Attending: SPECIALIST
Payer: COMMERCIAL

## 2025-04-11 VITALS
WEIGHT: 293 LBS | HEIGHT: 72 IN | HEART RATE: 89 BPM | OXYGEN SATURATION: 97 % | DIASTOLIC BLOOD PRESSURE: 84 MMHG | TEMPERATURE: 99 F | RESPIRATION RATE: 18 BRPM | BODY MASS INDEX: 39.68 KG/M2 | SYSTOLIC BLOOD PRESSURE: 117 MMHG

## 2025-04-11 DIAGNOSIS — N94.6 DYSMENORRHEA: Primary | ICD-10-CM

## 2025-04-11 LAB
ALBUMIN SERPL-MCNC: 3.5 G/DL (ref 3.5–5)
ALBUMIN/GLOB SERPL: 0.8 RATIO (ref 1.1–2)
ALP SERPL-CCNC: 60 UNIT/L (ref 40–150)
ALT SERPL-CCNC: 13 UNIT/L (ref 0–55)
ANION GAP SERPL CALC-SCNC: 7 MEQ/L
AST SERPL-CCNC: 12 UNIT/L (ref 11–45)
B-HCG UR QL: NEGATIVE
BACTERIA #/AREA URNS AUTO: ABNORMAL /HPF
BASOPHILS # BLD AUTO: 0.05 X10(3)/MCL
BASOPHILS NFR BLD AUTO: 0.6 %
BILIRUB SERPL-MCNC: 0.3 MG/DL
BILIRUB UR QL STRIP.AUTO: NEGATIVE
BUN SERPL-MCNC: 15.9 MG/DL (ref 7–18.7)
CALCIUM SERPL-MCNC: 9.3 MG/DL (ref 8.4–10.2)
CHLORIDE SERPL-SCNC: 108 MMOL/L (ref 98–107)
CLARITY UR: CLEAR
CO2 SERPL-SCNC: 27 MMOL/L (ref 22–29)
COLOR UR AUTO: YELLOW
CREAT SERPL-MCNC: 0.8 MG/DL (ref 0.55–1.02)
CREAT/UREA NIT SERPL: 20
EOSINOPHIL # BLD AUTO: 0.1 X10(3)/MCL (ref 0–0.9)
EOSINOPHIL NFR BLD AUTO: 1.3 %
ERYTHROCYTE [DISTWIDTH] IN BLOOD BY AUTOMATED COUNT: 13.2 % (ref 11.5–17)
GFR SERPLBLD CREATININE-BSD FMLA CKD-EPI: >60 ML/MIN/1.73/M2
GLOBULIN SER-MCNC: 4.3 GM/DL (ref 2.4–3.5)
GLUCOSE SERPL-MCNC: 87 MG/DL (ref 74–100)
GLUCOSE UR QL STRIP: NEGATIVE
HCT VFR BLD AUTO: 42.5 % (ref 37–47)
HGB BLD-MCNC: 13.9 G/DL (ref 12–16)
HGB UR QL STRIP: ABNORMAL
IMM GRANULOCYTES # BLD AUTO: 0.01 X10(3)/MCL (ref 0–0.04)
IMM GRANULOCYTES NFR BLD AUTO: 0.1 %
KETONES UR QL STRIP: NEGATIVE
LEUKOCYTE ESTERASE UR QL STRIP: NEGATIVE
LYMPHOCYTES # BLD AUTO: 3.03 X10(3)/MCL (ref 0.6–4.6)
LYMPHOCYTES NFR BLD AUTO: 38.8 %
MCH RBC QN AUTO: 28 PG (ref 27–31)
MCHC RBC AUTO-ENTMCNC: 32.7 G/DL (ref 33–36)
MCV RBC AUTO: 85.5 FL (ref 80–94)
MONOCYTES # BLD AUTO: 0.72 X10(3)/MCL (ref 0.1–1.3)
MONOCYTES NFR BLD AUTO: 9.2 %
NEUTROPHILS # BLD AUTO: 3.89 X10(3)/MCL (ref 2.1–9.2)
NEUTROPHILS NFR BLD AUTO: 50 %
NITRITE UR QL STRIP: NEGATIVE
PH UR STRIP: 6 [PH]
PLATELET # BLD AUTO: 332 X10(3)/MCL (ref 130–400)
PMV BLD AUTO: 9.5 FL (ref 7.4–10.4)
POTASSIUM SERPL-SCNC: 4.3 MMOL/L (ref 3.5–5.1)
PROT SERPL-MCNC: 7.8 GM/DL (ref 6.4–8.3)
PROT UR QL STRIP: ABNORMAL
RBC # BLD AUTO: 4.97 X10(6)/MCL (ref 4.2–5.4)
RBC #/AREA URNS AUTO: ABNORMAL /HPF
SODIUM SERPL-SCNC: 142 MMOL/L (ref 136–145)
SP GR UR STRIP.AUTO: >=1.03 (ref 1–1.03)
SQUAMOUS #/AREA URNS AUTO: ABNORMAL /HPF
UROBILINOGEN UR STRIP-ACNC: 0.2
WBC # BLD AUTO: 7.8 X10(3)/MCL (ref 4.5–11.5)
WBC #/AREA URNS AUTO: ABNORMAL /HPF

## 2025-04-11 PROCEDURE — 85025 COMPLETE CBC W/AUTO DIFF WBC: CPT | Performed by: NURSE PRACTITIONER

## 2025-04-11 PROCEDURE — 63600175 PHARM REV CODE 636 W HCPCS: Mod: JZ,TB | Performed by: NURSE PRACTITIONER

## 2025-04-11 PROCEDURE — 81003 URINALYSIS AUTO W/O SCOPE: CPT | Performed by: NURSE PRACTITIONER

## 2025-04-11 PROCEDURE — 96372 THER/PROPH/DIAG INJ SC/IM: CPT | Performed by: NURSE PRACTITIONER

## 2025-04-11 PROCEDURE — 99284 EMERGENCY DEPT VISIT MOD MDM: CPT | Mod: 25

## 2025-04-11 PROCEDURE — 81025 URINE PREGNANCY TEST: CPT | Performed by: NURSE PRACTITIONER

## 2025-04-11 PROCEDURE — 80053 COMPREHEN METABOLIC PANEL: CPT | Performed by: NURSE PRACTITIONER

## 2025-04-11 RX ORDER — NAPROXEN 375 MG/1
375 TABLET ORAL EVERY 12 HOURS PRN
Qty: 60 TABLET | Refills: 0 | Status: SHIPPED | OUTPATIENT
Start: 2025-04-11

## 2025-04-11 RX ORDER — MEDROXYPROGESTERONE ACETATE 10 MG/1
10 TABLET ORAL DAILY
Qty: 10 TABLET | Refills: 0 | Status: SHIPPED | OUTPATIENT
Start: 2025-04-11 | End: 2025-04-21

## 2025-04-11 RX ORDER — KETOROLAC TROMETHAMINE 30 MG/ML
60 INJECTION, SOLUTION INTRAMUSCULAR; INTRAVENOUS
Status: COMPLETED | OUTPATIENT
Start: 2025-04-11 | End: 2025-04-11

## 2025-04-11 RX ADMIN — KETOROLAC TROMETHAMINE 60 MG: 30 INJECTION, SOLUTION INTRAMUSCULAR at 08:04

## 2025-04-11 NOTE — Clinical Note
"Elena Hernandez" Erik was seen and treated in our emergency department on 4/11/2025.  She may return to work on 04/14/2025.       If you have any questions or concerns, please don't hesitate to call.      Yolanda Santiago FNP"

## 2025-04-12 NOTE — DISCHARGE INSTRUCTIONS
See your gynecologist  Naprosyn twice a day as needed for pain, take with food  Provera once daily for 10 days.

## 2025-04-12 NOTE — ED PROVIDER NOTES
Encounter Date: 4/11/2025       History     Chief Complaint   Patient presents with    Abdominal Pain     C/o left lower abdominal pain. Currently on her period and is passing a lot of clots as per pt. Denies problem on urination. Took ibuprofen @2pm.     34-year-old female presents to ER complaining of left lower quadrant abdominal pain with heavy vaginal bleeding and passing clots.  States now having some dizziness and weakness.  She denies any urinary symptoms, vomiting or diarrhea.  She denies any fever or chills.    The history is provided by the patient. No  was used.     Review of patient's allergies indicates:   Allergen Reactions    Hydromorphone Rash     Past Medical History:   Diagnosis Date    Anxiety     Back problem     Hypertension     IBS (irritable bowel syndrome)     Migraine headache     Obesity     TMJ (temporomandibular joint disorder)      Past Surgical History:   Procedure Laterality Date    CYSTOURETEROSCOPY WITH RETROGRADE PYELOGRAPHY AND INSERTION OF STENT INTO URETER  10/8/2019    Procedure: CYSTOURETEROSCOPY, WITH RETROGRADE PYELOGRAM AND URETERAL STENT INSERTION;  Surgeon: Kristy Ahmadi MD;  Location: [facility] OR;  Service: Urology;;    URETEROSCOPIC REMOVAL OF URETERIC CALCULUS Left 10/8/2019    Procedure: Cystoscopy, possible retrograde pyelogram, ureteroscopy with laser lithotripsy, placement of ureteral stent;  Surgeon: Kristy Ahmadi MD;  Location: [facility] OR;  Service: Urology;  Laterality: Left;  RN PREOP 10/7/2019     Family History   Problem Relation Name Age of Onset    Heart attack Paternal Grandfather      Cancer Paternal Grandmother  50        Pancreatic cancer    Emphysema Maternal Grandfather      Breast cancer Neg Hx      Colon cancer Neg Hx      Ovarian cancer Neg Hx       Social History[1]  Review of Systems   Constitutional:  Positive for fatigue. Negative for chills and fever.   Gastrointestinal:  Positive for abdominal pain. Negative for  diarrhea, nausea and vomiting.   Genitourinary:  Positive for menstrual problem and vaginal bleeding. Negative for dysuria.   Neurological:  Positive for light-headedness.   All other systems reviewed and are negative.      Physical Exam     Initial Vitals [04/11/25 1922]   BP Pulse Resp Temp SpO2   (!) 212/112 89 18 98.9 °F (37.2 °C) 97 %      MAP       --         Physical Exam    Nursing note and vitals reviewed.  Constitutional: She appears well-developed and well-nourished.   HENT:   Head: Normocephalic.   Eyes: EOM are normal.   Neck: Neck supple.   Cardiovascular:  Normal rate and regular rhythm.           Pulmonary/Chest: Breath sounds normal. No respiratory distress.   Abdominal: Abdomen is soft. There is abdominal tenderness in the left lower quadrant. There is no rebound and no guarding.   Musculoskeletal:         General: Normal range of motion.      Cervical back: Neck supple.     Neurological: She is alert and oriented to person, place, and time.   Skin: Skin is warm and dry. Capillary refill takes less than 2 seconds.   Psychiatric: She has a normal mood and affect.         ED Course   Procedures  Labs Reviewed   URINALYSIS, REFLEX TO URINE CULTURE - Abnormal       Result Value    Color, UA Yellow      Appearance, UA Clear      Specific Gravity, UA >=1.030      pH, UA 6.0      Protein, UA Trace (*)     Glucose, UA Negative      Ketones, UA Negative      Blood, UA Large (*)     Bilirubin, UA Negative      Urobilinogen, UA 0.2      Nitrites, UA Negative      Leukocyte Esterase, UA Negative     COMPREHENSIVE METABOLIC PANEL - Abnormal    Sodium 142      Potassium 4.3      Chloride 108 (*)     CO2 27      Glucose 87      Blood Urea Nitrogen 15.9      Creatinine 0.80      Calcium 9.3      Protein Total 7.8      Albumin 3.5      Globulin 4.3 (*)     Albumin/Globulin Ratio 0.8 (*)     Bilirubin Total 0.3      ALP 60      ALT 13      AST 12      eGFR >60      Anion Gap 7.0      BUN/Creatinine Ratio 20     CBC  WITH DIFFERENTIAL - Abnormal    WBC 7.80      RBC 4.97      Hgb 13.9      Hct 42.5      MCV 85.5      MCH 28.0      MCHC 32.7 (*)     RDW 13.2      Platelet 332      MPV 9.5      Neut % 50.0      Lymph % 38.8      Mono % 9.2      Eos % 1.3      Basophil % 0.6      Imm Grans % 0.1      Neut # 3.89      Lymph # 3.03      Mono # 0.72      Eos # 0.10      Baso # 0.05      Imm Gran # 0.01     URINALYSIS, MICROSCOPIC - Abnormal    Bacteria, UA None Seen      RBC, UA 0-5      WBC, UA 0-2      Squamous Epithelial Cells, UA Many (*)    PREGNANCY TEST, URINE RAPID - Normal    hCG Qualitative, Urine Negative     CBC W/ AUTO DIFFERENTIAL    Narrative:     The following orders were created for panel order CBC auto differential.  Procedure                               Abnormality         Status                     ---------                               -----------         ------                     CBC with Differential[[id number]]       Abnormal            Final result                 Please view results for these tests on the individual orders.          Imaging Results    None          Medications   ketorolac injection 60 mg (60 mg Intramuscular Given 4/11/25 2032)     Medical Decision Making  DDX: dysmenorrhea, anemia    34 year old female with heavy menses, LLQ pain and dizziness. No leukocytosis, normal H/H. UPT negative. Abdomen soft with minimal LLQ tenderness. Ketorolac given in ER and Rx for Naproxen and provera. Patient to f/u with gynecology.     Amount and/or Complexity of Data Reviewed  Labs: ordered. Decision-making details documented in ED Course.    Risk  Prescription drug management.               ED Course as of 04/11/25 2035 Fri Apr 11, 2025 2028 Hemoglobin: 13.9 [LN]   2028 Hematocrit: 42.5 [LN]   2028 hCG Qualitative, Urine: Negative [LN]   2028 WBC: 7.80 [LN]      ED Course User Index  [LN] Yolanda Santiago FNP                           Clinical Impression:  Final diagnoses:  [N94.6] Dysmenorrhea  (Primary)          ED Disposition Condition    Discharge Stable          ED Prescriptions       Medication Sig Dispense Start Date End Date Auth. Provider    medroxyPROGESTERone (PROVERA) 10 MG tablet Take 1 tablet (10 mg total) by mouth once daily. for 10 days 10 tablet 4/11/2025 4/21/2025 Yolanda Santiago FNP    naproxen (NAPROSYN) 375 MG tablet Take 1 tablet (375 mg total) by mouth every 12 (twelve) hours as needed (pain). 60 tablet 4/11/2025 -- Yolanda Santiago FNP          Follow-up Information    None            [1]   Social History  Tobacco Use    Smoking status: Never    Smokeless tobacco: Never   Substance Use Topics    Alcohol use: No     Comment: socially    Drug use: No        Yolanda Santiago FNP  04/11/25 2035

## 2025-05-05 ENCOUNTER — OFFICE VISIT (OUTPATIENT)
Dept: OBSTETRICS AND GYNECOLOGY | Facility: CLINIC | Age: 35
End: 2025-05-05
Payer: COMMERCIAL

## 2025-05-05 VITALS
WEIGHT: 293 LBS | HEIGHT: 72 IN | SYSTOLIC BLOOD PRESSURE: 146 MMHG | DIASTOLIC BLOOD PRESSURE: 82 MMHG | BODY MASS INDEX: 39.68 KG/M2

## 2025-05-05 DIAGNOSIS — I10 HYPERTENSION, UNSPECIFIED TYPE: ICD-10-CM

## 2025-05-05 DIAGNOSIS — E66.01 MORBID OBESITY WITH BMI OF 50.0-59.9, ADULT: ICD-10-CM

## 2025-05-05 DIAGNOSIS — E28.2 PCOS (POLYCYSTIC OVARIAN SYNDROME): ICD-10-CM

## 2025-05-05 DIAGNOSIS — Z01.419 WELL WOMAN EXAM WITH ROUTINE GYNECOLOGICAL EXAM: Primary | ICD-10-CM

## 2025-05-05 PROCEDURE — 87491 CHLMYD TRACH DNA AMP PROBE: CPT | Performed by: OBSTETRICS & GYNECOLOGY

## 2025-05-05 PROCEDURE — 1160F RVW MEDS BY RX/DR IN RCRD: CPT | Mod: CPTII,S$GLB,, | Performed by: OBSTETRICS & GYNECOLOGY

## 2025-05-05 PROCEDURE — 81515 NFCT DS BV&VAGINITIS DNA ALG: CPT | Performed by: OBSTETRICS & GYNECOLOGY

## 2025-05-05 PROCEDURE — 99395 PREV VISIT EST AGE 18-39: CPT | Mod: S$GLB,,, | Performed by: OBSTETRICS & GYNECOLOGY

## 2025-05-05 PROCEDURE — 1159F MED LIST DOCD IN RCRD: CPT | Mod: CPTII,S$GLB,, | Performed by: OBSTETRICS & GYNECOLOGY

## 2025-05-05 PROCEDURE — 3008F BODY MASS INDEX DOCD: CPT | Mod: CPTII,S$GLB,, | Performed by: OBSTETRICS & GYNECOLOGY

## 2025-05-05 PROCEDURE — 3077F SYST BP >= 140 MM HG: CPT | Mod: CPTII,S$GLB,, | Performed by: OBSTETRICS & GYNECOLOGY

## 2025-05-05 PROCEDURE — 3079F DIAST BP 80-89 MM HG: CPT | Mod: CPTII,S$GLB,, | Performed by: OBSTETRICS & GYNECOLOGY

## 2025-05-05 PROCEDURE — 99999 PR PBB SHADOW E&M-EST. PATIENT-LVL III: CPT | Mod: PBBFAC,,, | Performed by: OBSTETRICS & GYNECOLOGY

## 2025-05-05 RX ORDER — LABETALOL 100 MG/1
TABLET, FILM COATED ORAL
COMMUNITY
Start: 2025-02-24

## 2025-05-05 RX ORDER — SEMAGLUTIDE 0.5 MG/.5ML
0.5 INJECTION, SOLUTION SUBCUTANEOUS
COMMUNITY
Start: 2025-04-07

## 2025-05-05 RX ORDER — METOPROLOL TARTRATE 50 MG/1
TABLET ORAL
COMMUNITY

## 2025-05-05 NOTE — PROGRESS NOTES
Subjective:       Patient ID: Elena Valencia is a 34 y.o. female.    Chief Complaint:  Well Woman (Last normal pap with Negative HPV was 2024. )        History of Present Illness  HPI  Annual Exam-Premenopausal  Patient presents for annual exam.  The patient is sexually active. GYN screening history: Last normal pap with Negative HPV was 2024. The patient wears seatbelts: yes. The patient participates in regular exercise: no. Has the patient ever been transfused or tattooed?: no / yes. The patient reports that there is not domestic violence in her life.    Has been with heavy and  prolonged menses.  Worsening recently.  Was on OCP and Depo Provera previously.  Does not want to get back on those  Wants children soon.      History of chronic hypertension.  On medication  Recently started on Wegovy      GYN & OB History  Patient's last menstrual period was 04/10/2025 (exact date).   Date of Last Pap: 2024    OB History    Para Term  AB Living   1    1    SAB IAB Ectopic Multiple Live Births   1          # Outcome Date GA Lbr Leonardo/2nd Weight Sex Type Anes PTL Lv   1 SAB 2018               Past Medical History:   Diagnosis Date    Anxiety     Back problem     Hypertension     IBS (irritable bowel syndrome)     Migraine headache     Obesity     TMJ (temporomandibular joint disorder)        Past Surgical History:   Procedure Laterality Date    CYSTOURETEROSCOPY WITH RETROGRADE PYELOGRAPHY AND INSERTION OF STENT INTO URETER  10/8/2019    Procedure: CYSTOURETEROSCOPY, WITH RETROGRADE PYELOGRAM AND URETERAL STENT INSERTION;  Surgeon: Kristy Ahmadi MD;  Location: [facility] OR;  Service: Urology;;    URETEROSCOPIC REMOVAL OF URETERIC CALCULUS Left 10/8/2019    Procedure: Cystoscopy, possible retrograde pyelogram, ureteroscopy with laser lithotripsy, placement of ureteral stent;  Surgeon: Kristy Ahmadi MD;  Location: [facility] OR;  Service: Urology;  Laterality: Left;  RN PREOP  10/7/2019       Family History   Problem Relation Name Age of Onset    Heart attack Paternal Grandfather      Cancer Paternal Grandmother  50        Pancreatic cancer    Emphysema Maternal Grandfather      Breast cancer Neg Hx      Colon cancer Neg Hx      Ovarian cancer Neg Hx         Social History     Socioeconomic History    Marital status: Single   Tobacco Use    Smoking status: Never     Passive exposure: Never    Smokeless tobacco: Never   Substance and Sexual Activity    Alcohol use: No     Comment: socially    Drug use: No    Sexual activity: Yes     Partners: Male     Birth control/protection: Condom   Social History Narrative    Together since 2010    He drives trucks    She is a      Social Drivers of Health     Financial Resource Strain: Low Risk  (4/28/2025)    Overall Financial Resource Strain (CARDIA)     Difficulty of Paying Living Expenses: Not hard at all   Food Insecurity: No Food Insecurity (4/28/2025)    Hunger Vital Sign     Worried About Running Out of Food in the Last Year: Never true     Ran Out of Food in the Last Year: Never true   Transportation Needs: No Transportation Needs (4/28/2025)    PRAPARE - Transportation     Lack of Transportation (Medical): No     Lack of Transportation (Non-Medical): No   Physical Activity: Insufficiently Active (4/28/2025)    Exercise Vital Sign     Days of Exercise per Week: 2 days     Minutes of Exercise per Session: 20 min   Stress: No Stress Concern Present (4/28/2025)    [employer] - Occupational Stress Questionnaire     Feeling of Stress : Only a little   Housing Stability: Low Risk  (4/28/2025)    Housing Stability Vital Sign     Unable to Pay for Housing in the Last Year: No     Number of Times Moved in the Last Year: 0     Homeless in the Last Year: No       Current Outpatient Medications   Medication Sig Dispense Refill    ALPRAZolam (XANAX) 0.25 MG tablet Take by mouth.      amLODIPine (NORVASC) 10 MG tablet  Take 10 mg by mouth once daily.      boric acid (BORIC ACID) vaginal suppository Place 1 each (650 mg total) vaginally every evening. 30 suppository 2    clomiPHENE (CLOMID) 50 mg tablet Take 1 tablet (50 mg total) by mouth once daily. 5 tablet 3    dicyclomine (BENTYL) 10 MG capsule Take 10 mg by mouth 3 (three) times daily as needed (IBS).      estradioL (ESTRACE) 1 MG tablet Take 3 tablets (3 mg total) by mouth 2 (two) times daily. 27 tablet 3    labetaloL (NORMODYNE) 100 MG tablet Take 1 tablet twice a day by oral route for 30 days.      LORazepam (ATIVAN) 0.5 MG tablet Take 0.5 mg by mouth 2 (two) times daily.  1    metFORMIN (GLUCOPHAGE-XR) 500 MG ER 24hr tablet Take 1 tablet (500 mg total) by mouth daily with breakfast. 30 tablet 3    metoprolol tartrate (LOPRESSOR) 50 MG tablet 1 tablet with food Orally QHS      naproxen (NAPROSYN) 375 MG tablet Take 1 tablet (375 mg total) by mouth every 12 (twelve) hours as needed (pain). 60 tablet 0    promethazine (PHENERGAN) 25 MG tablet Take 25 mg by mouth every 4 (four) hours as needed for Nausea (IBS).      WEGOVY 0.5 mg/0.5 mL PnIj Inject 0.5 mg into the skin every 7 days.      zolpidem (AMBIEN) 10 mg Tab Take 10 mg by mouth nightly.       No current facility-administered medications for this visit.       Review of patient's allergies indicates:   Allergen Reactions    Hydromorphone Rash        Review of Systems  Review of Systems   Constitutional:  Negative for activity change, appetite change, chills, fatigue, fever and unexpected weight change.   HENT:  Negative for mouth sores.    Respiratory:  Negative for cough, shortness of breath and wheezing.    Cardiovascular:  Negative for chest pain and palpitations.   Gastrointestinal:  Negative for abdominal pain, bloating, blood in stool, constipation, nausea and vomiting.   Endocrine: Negative for diabetes and hot flashes.   Genitourinary:  Positive for menorrhagia, menstrual problem and vaginal discharge. Negative  for dysmenorrhea, dyspareunia, dysuria, frequency, hematuria, pelvic pain, urgency, vaginal bleeding, vaginal pain, urinary incontinence, postcoital bleeding and vaginal odor.   Musculoskeletal:  Negative for back pain and myalgias.   Integumentary:  Negative for rash, breast mass and nipple discharge.   Neurological:  Negative for seizures and headaches.   Psychiatric/Behavioral:  Negative for depression and sleep disturbance. The patient is not nervous/anxious.    Breast: Negative for mass, mastodynia and nipple discharge          Objective:    Physical Exam:   Constitutional: She appears well-developed and well-nourished. No distress.   BMI of 56.51    HENT:   Head: Normocephalic and atraumatic.    Eyes: EOM are normal.      Pulmonary/Chest: Effort normal. No respiratory distress.   Breasts: Non-tender, no engorgement, no masses, no retraction, no discharge. Negative for lymphadenopathy.         Abdominal: Soft. She exhibits no distension. There is no abdominal tenderness. There is no rebound and no guarding.     Genitourinary:    Vagina and uterus normal.   No vaginal discharge in the vagina.    Genitourinary Comments: Vulva without any obvious lesions.  Urethral meatus normal size and location without any lesion.  Urethra is non-tender without stricture or discharge.  Bladder is non-tender.  Vaginal vault with good support.  Minimal white discharge noted.  No obvious lesion.  Normal rugation.  Cervix is without any cervical motion tenderness.  No obvious lesion.  Uterus is small, non-tender, normal contour.  Adnexa is without any masses or tenderness.  Perineum without obvious lesion.               Musculoskeletal: Normal range of motion.       Neurological: She is alert.    Skin: Skin is warm and dry.   Acanthosis nigricans in between breasts    Psychiatric: She has a normal mood and affect.   Tense. Anxious          Assessment:        1. Well woman exam with routine gynecological exam    2. Morbid obesity  with BMI of 50.0-59.9, adult    3. Hypertension, unspecified type    4. PCOS (polycystic ovarian syndrome)              Plan:          I have discussed with the patient her condition.  Monthly breast examination was instructed, discussed, and encouraged.  Patient was encouraged to consume a low-calorie, low fat diet, and to increase of physical activity.  Healthy habits encouraged.  She would need to lose weight  A Pap smear was not performed with HR-HPV according to the USPSTF recommendations.  Mammogram was not ordered because of the combination of her age and risk factors, according to ACOG guidelines.  Gonorrhea and Chlamydia testing performed;  HIV test offered, again according to guidelines.  Colonoscopy discussed according to ACS guideline.   Care Gaps addressed.  She will come back to see me in one year for her annual visit.  She can come back to see me sooner as necessary.  All of her questions were answered appropriately to her satisfaction.     We discussed her menses.  PCOS discussed  Best to treat her bleeding is the Mirena though it might be very difficult to place  Oral medications would not work well on GLP-1  The Depo Provera would make her gain more weight.   And both OCP/Depo Provera failed previously  We discussed the patches and the rings    She will be back for IUD/Mirena insertion    ** A female chaperone, Yumiko Galvan, was present for the pelvic exam

## 2025-05-07 LAB
BACTERIAL VAGINOSIS DNA (OHS): DETECTED
C TRACH DNA SPEC QL NAA+PROBE: NOT DETECTED
CANDIDA GLABRATA/KRUSEI DNA (OHS): NOT DETECTED
CANDIDA SPECIES DNA (OHS): NOT DETECTED
CTGC SOURCE (OHS) ORD-325: NORMAL
N GONORRHOEA DNA UR QL NAA+PROBE: NOT DETECTED
TRICHOMONAS VAGINALIS DNA (OHS): NOT DETECTED

## 2025-05-08 ENCOUNTER — PATIENT MESSAGE (OUTPATIENT)
Dept: OBSTETRICS AND GYNECOLOGY | Facility: HOSPITAL | Age: 35
End: 2025-05-08
Payer: COMMERCIAL

## 2025-05-08 DIAGNOSIS — N76.0 BACTERIAL VAGINOSIS: Primary | ICD-10-CM

## 2025-05-08 DIAGNOSIS — B96.89 BACTERIAL VAGINOSIS: Primary | ICD-10-CM

## 2025-05-08 RX ORDER — METRONIDAZOLE 500 MG/1
500 TABLET ORAL EVERY 12 HOURS
Qty: 14 TABLET | Refills: 0 | Status: SHIPPED | OUTPATIENT
Start: 2025-05-08

## (undated) DEVICE — URETEROSCOPE LITHOVUE STANDARD

## (undated) DEVICE — SYR 50ML CATH TIP

## (undated) DEVICE — CONTAINER SPECIMEN STRL 4OZ

## (undated) DEVICE — FOAM APP FILM BARRIER NO STING

## (undated) DEVICE — SEE MEDLINE ITEM 152532

## (undated) DEVICE — SYR ONLY LUER LOCK 20CC

## (undated) DEVICE — CANISTER SUCTION 2 LTR

## (undated) DEVICE — ADAPT UROLOGICAL 2-WAY STOPCK

## (undated) DEVICE — SHEATH NAVIGATOR HD 11/13 36

## (undated) DEVICE — BLANKET UPPER BODY 78.7X29.9IN

## (undated) DEVICE — CATH URTRL OPEN END STR TIP 5F

## (undated) DEVICE — GLOVE SURGICAL LATEX SZ 6.5

## (undated) DEVICE — BASKET STONE RETRV 1.9FRX120CM

## (undated) DEVICE — GLOVE BIOGEL PI MICRO SZ 7

## (undated) DEVICE — Device

## (undated) DEVICE — COVER SNAP KAP 26IN

## (undated) DEVICE — MAT QUICK 40X30 FLOOR FLUID LF

## (undated) DEVICE — SUPPORT ULNA NERVE PROTECTOR

## (undated) DEVICE — DRESSING TRANS 2X2 TEGADERM

## (undated) DEVICE — SOL IRR NACL .9% 3000ML

## (undated) DEVICE — SENSOR DUAL FLEX STR 150CM